# Patient Record
Sex: MALE | Race: WHITE | NOT HISPANIC OR LATINO | URBAN - METROPOLITAN AREA
[De-identification: names, ages, dates, MRNs, and addresses within clinical notes are randomized per-mention and may not be internally consistent; named-entity substitution may affect disease eponyms.]

---

## 2018-05-23 ENCOUNTER — EMERGENCY (EMERGENCY)
Facility: HOSPITAL | Age: 73
LOS: 1 days | Discharge: HOME | End: 2018-05-23
Attending: EMERGENCY MEDICINE | Admitting: EMERGENCY MEDICINE
Payer: MEDICARE

## 2018-05-23 VITALS
HEIGHT: 71 IN | DIASTOLIC BLOOD PRESSURE: 92 MMHG | HEART RATE: 89 BPM | RESPIRATION RATE: 18 BRPM | WEIGHT: 179.9 LBS | TEMPERATURE: 99 F | OXYGEN SATURATION: 95 % | SYSTOLIC BLOOD PRESSURE: 123 MMHG

## 2018-05-23 VITALS
TEMPERATURE: 98 F | HEART RATE: 71 BPM | DIASTOLIC BLOOD PRESSURE: 85 MMHG | RESPIRATION RATE: 16 BRPM | SYSTOLIC BLOOD PRESSURE: 129 MMHG | OXYGEN SATURATION: 98 %

## 2018-05-23 DIAGNOSIS — R07.81 PLEURODYNIA: ICD-10-CM

## 2018-05-23 DIAGNOSIS — Z79.1 LONG TERM (CURRENT) USE OF NON-STEROIDAL ANTI-INFLAMMATORIES (NSAID): ICD-10-CM

## 2018-05-23 DIAGNOSIS — R25.1 TREMOR, UNSPECIFIED: ICD-10-CM

## 2018-05-23 DIAGNOSIS — Z79.899 OTHER LONG TERM (CURRENT) DRUG THERAPY: ICD-10-CM
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
## 2018-05-23 PROCEDURE — 99284 EMERGENCY DEPT VISIT MOD MDM: CPT | Mod: 25

## 2018-05-23 PROCEDURE — 71250 CT THORAX DX C-: CPT | Mod: 26

## 2018-05-23 PROCEDURE — 99284 EMERGENCY DEPT VISIT MOD MDM: CPT

## 2018-05-23 PROCEDURE — 71250 CT THORAX DX C-: CPT

## 2018-05-23 RX ORDER — RASAGILINE 0.5 MG/1
0 TABLET ORAL
Qty: 0 | Refills: 0 | COMMUNITY

## 2018-05-23 RX ORDER — LIDOCAINE 4 G/100G
1 CREAM TOPICAL
Qty: 14 | Refills: 0 | OUTPATIENT
Start: 2018-05-23 | End: 2018-06-05

## 2018-05-23 RX ORDER — LIDOCAINE 4 G/100G
1 CREAM TOPICAL ONCE
Qty: 0 | Refills: 0 | Status: COMPLETED | OUTPATIENT
Start: 2018-05-23 | End: 2018-05-23

## 2018-05-23 RX ORDER — PROPAFENONE HCL 150 MG
0 TABLET ORAL
Qty: 0 | Refills: 0 | COMMUNITY

## 2018-05-23 RX ORDER — ROTIGOTINE 8 MG/24H
0 PATCH, EXTENDED RELEASE TRANSDERMAL
Qty: 0 | Refills: 0 | COMMUNITY

## 2018-05-23 RX ORDER — ACETAMINOPHEN 500 MG
975 TABLET ORAL ONCE
Qty: 0 | Refills: 0 | Status: COMPLETED | OUTPATIENT
Start: 2018-05-23 | End: 2018-05-23

## 2018-05-23 RX ORDER — APIXABAN 2.5 MG/1
0 TABLET, FILM COATED ORAL
Qty: 0 | Refills: 0 | COMMUNITY

## 2018-05-23 RX ADMIN — LIDOCAINE 1 PATCH: 4 CREAM TOPICAL at 18:42

## 2018-05-23 RX ADMIN — Medication 975 MILLIGRAM(S): at 18:42

## 2018-05-23 NOTE — ED PROVIDER NOTE - MEDICAL DECISION MAKING DETAILS
rib pain after fall, no sob, no crepitus, no flail segment noted, soft abd w/ +BS and nontender, will CT chest/rib eval for injury, analgesia, reassess, low suspicion for bowel injury

## 2018-05-23 NOTE — ED ADULT TRIAGE NOTE - CHIEF COMPLAINT QUOTE
"I fell on Monday into my night stand and hit my right side." pt c/o right sided abdominal and rib pain. denies head injury.

## 2018-05-23 NOTE — ED PROVIDER NOTE - PROGRESS NOTE DETAILS
resting comfortably, well appearing, seeking dc w/ prelim result, understands may miss potentially life threatening conditions/diagnosis on final result, ao x 4, has capacity to make decisions, understands return precautions

## 2018-05-23 NOTE — ED ADULT NURSE NOTE - OBJECTIVE STATEMENT
74 y/o male presents to the ED c/o right lower rib, RUQ abdominal pain from fall last night. Pt reports getting out of bed, tripping and falling to end table which struck his RUQ region. Pt reports abdominal mesh in place from surgery and was concerned for integrity of mesh. pt denies SOB, but rubs his right lower rib as he admits it is sore. Pt rates pain 6/10 by VAS with activity. Pt. speaks clear, MAEx4, has unlabored breathing. Abd soft, tender RUQ, nd. Skin dry, warm.

## 2018-05-23 NOTE — ED PROVIDER NOTE - PHYSICAL EXAMINATION
CON: ao x 3, HENMT: clear oropharynx, soft neck, HEAD: atraumatic, CV: rrr, equal pulses b/l, RESP: cta b/l, GI: +BS, soft, nontender, no rebound, no guarding, SKIN: no rash, MSK: tender anterior right lower rib, no crepitus, no deformities, no effusion to joints, full ROM of all extremities w/o pain, NEURO: no gross motor or sensory deficit, baseline tremors noted

## 2018-05-23 NOTE — ED ADULT NURSE NOTE - CHPI ED SYMPTOMS NEG
no abrasion/no loss of consciousness/no vomiting/no confusion/no numbness/no tingling/no deformity/no fever/no weakness

## 2018-05-23 NOTE — ED PROVIDER NOTE - OBJECTIVE STATEMENT
73 yom pw fall onto night stand, c/o R lower rib pain.  hx of abd hernia w/ mesh repair years ago, no nv, denies abd pain, +flatus, +BM.  denies HA or head trauma.  no neck pain.  hx of parkinsons.

## 2018-05-25 ENCOUNTER — APPOINTMENT (OUTPATIENT)
Dept: HEART AND VASCULAR | Facility: CLINIC | Age: 73
End: 2018-05-25
Payer: MEDICARE
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
## 2018-05-25 DIAGNOSIS — I48.0 PAROXYSMAL ATRIAL FIBRILLATION: ICD-10-CM

## 2018-05-25 DIAGNOSIS — I35.0 NONRHEUMATIC AORTIC (VALVE) STENOSIS: ICD-10-CM

## 2018-05-25 DIAGNOSIS — G20 PARKINSON'S DISEASE: ICD-10-CM

## 2018-05-25 PROCEDURE — 99214 OFFICE O/P EST MOD 30 MIN: CPT

## 2018-05-25 RX ORDER — ROTIGOTINE 6 MG/24H
6 PATCH, EXTENDED RELEASE TRANSDERMAL
Qty: 30 | Refills: 0 | Status: ACTIVE | COMMUNITY
Start: 2018-02-08

## 2018-05-25 RX ORDER — ROTIGOTINE 4 MG/24H
4 PATCH, EXTENDED RELEASE TRANSDERMAL
Qty: 30 | Refills: 0 | Status: DISCONTINUED | COMMUNITY
Start: 2018-01-27

## 2018-05-25 RX ORDER — SILDENAFIL CITRATE 100 MG/1
100 TABLET, FILM COATED ORAL
Qty: 6 | Refills: 0 | Status: ACTIVE | COMMUNITY
Start: 2017-12-08

## 2018-05-25 RX ORDER — APIXABAN 5 MG/1
5 TABLET, FILM COATED ORAL
Qty: 180 | Refills: 0 | Status: ACTIVE | COMMUNITY
Start: 2018-02-12

## 2018-05-25 RX ORDER — RASAGILINE 0.5 MG/1
0.5 TABLET ORAL
Qty: 90 | Refills: 0 | Status: DISCONTINUED | COMMUNITY
Start: 2018-02-08

## 2018-05-25 RX ORDER — ROTIGOTINE 6 MG/24H
6 PATCH, EXTENDED RELEASE TRANSDERMAL
Qty: 90 | Refills: 0 | Status: ACTIVE | COMMUNITY
Start: 2018-05-25

## 2018-05-25 RX ORDER — PROPAFENONE HYDROCHLORIDE 150 MG/1
150 TABLET, FILM COATED ORAL
Qty: 270 | Refills: 0 | Status: ACTIVE | COMMUNITY
Start: 2018-02-12
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
## 2018-05-27 PROBLEM — I48.0 PAROXYSMAL ATRIAL FIBRILLATION: Status: ACTIVE | Noted: 2018-05-27

## 2024-09-10 ENCOUNTER — APPOINTMENT (RX ONLY)
Dept: URBAN - METROPOLITAN AREA CLINIC 38 | Facility: CLINIC | Age: 79
Setting detail: DERMATOLOGY
End: 2024-09-10

## 2024-09-10 DIAGNOSIS — D18.0 HEMANGIOMA: ICD-10-CM

## 2024-09-10 DIAGNOSIS — L21.8 OTHER SEBORRHEIC DERMATITIS: ICD-10-CM

## 2024-09-10 DIAGNOSIS — Z85.820 PERSONAL HISTORY OF MALIGNANT MELANOMA OF SKIN: ICD-10-CM

## 2024-09-10 DIAGNOSIS — Z71.89 OTHER SPECIFIED COUNSELING: ICD-10-CM

## 2024-09-10 DIAGNOSIS — L82.1 OTHER SEBORRHEIC KERATOSIS: ICD-10-CM

## 2024-09-10 DIAGNOSIS — L72.0 EPIDERMAL CYST: ICD-10-CM

## 2024-09-10 DIAGNOSIS — D485 NEOPLASM OF UNCERTAIN BEHAVIOR OF SKIN: ICD-10-CM

## 2024-09-10 DIAGNOSIS — L11.1 TRANSIENT ACANTHOLYTIC DERMATOSIS [GROVER]: ICD-10-CM

## 2024-09-10 DIAGNOSIS — L57.0 ACTINIC KERATOSIS: ICD-10-CM

## 2024-09-10 DIAGNOSIS — D22 MELANOCYTIC NEVI: ICD-10-CM

## 2024-09-10 DIAGNOSIS — Z85.828 PERSONAL HISTORY OF OTHER MALIGNANT NEOPLASM OF SKIN: ICD-10-CM

## 2024-09-10 DIAGNOSIS — L81.4 OTHER MELANIN HYPERPIGMENTATION: ICD-10-CM

## 2024-09-10 DIAGNOSIS — B35.1 TINEA UNGUIUM: ICD-10-CM

## 2024-09-10 PROBLEM — D48.5 NEOPLASM OF UNCERTAIN BEHAVIOR OF SKIN: Status: ACTIVE | Noted: 2024-09-10

## 2024-09-10 PROBLEM — D18.01 HEMANGIOMA OF SKIN AND SUBCUTANEOUS TISSUE: Status: ACTIVE | Noted: 2024-09-10

## 2024-09-10 PROBLEM — D22.9 MELANOCYTIC NEVI, UNSPECIFIED: Status: ACTIVE | Noted: 2024-09-10

## 2024-09-10 PROCEDURE — ? REFERRAL CORRESPONDENCE

## 2024-09-10 PROCEDURE — 99203 OFFICE O/P NEW LOW 30 MIN: CPT | Mod: 25

## 2024-09-10 PROCEDURE — 17003 DESTRUCT PREMALG LES 2-14: CPT

## 2024-09-10 PROCEDURE — ? PRESCRIPTION

## 2024-09-10 PROCEDURE — ? OBSERVATION

## 2024-09-10 PROCEDURE — ? LIQUID NITROGEN

## 2024-09-10 PROCEDURE — ? COUNSELING

## 2024-09-10 PROCEDURE — 17000 DESTRUCT PREMALG LESION: CPT

## 2024-09-10 RX ORDER — KETOCONAZOLE 20 MG/ML
SHAMPOO, SUSPENSION TOPICAL
Qty: 120 | Refills: 3 | Status: ERX | COMMUNITY
Start: 2024-09-10

## 2024-09-10 RX ADMIN — KETOCONAZOLE: 20 SHAMPOO, SUSPENSION TOPICAL at 00:00

## 2024-09-10 ASSESSMENT — LOCATION DETAILED DESCRIPTION DERM
LOCATION DETAILED: LEFT DISTAL DORSAL FOREARM
LOCATION DETAILED: LEFT GREAT TOENAIL
LOCATION DETAILED: LEFT SUPERIOR PARIETAL SCALP
LOCATION DETAILED: PERIUMBILICAL SKIN
LOCATION DETAILED: LEFT CENTRAL TEMPLE
LOCATION DETAILED: RIGHT PROXIMAL DORSAL FOREARM
LOCATION DETAILED: LEFT SUPERIOR MEDIAL MIDBACK
LOCATION DETAILED: LEFT FOREHEAD
LOCATION DETAILED: LEFT CLAVICULAR NECK
LOCATION DETAILED: RIGHT MEDIAL TRAPEZIAL NECK
LOCATION DETAILED: STERNUM
LOCATION DETAILED: RIGHT MEDIAL INFERIOR CHEST
LOCATION DETAILED: LEFT SUPERIOR CENTRAL BUCCAL CHEEK
LOCATION DETAILED: LEFT ANTERIOR PROXIMAL UPPER ARM
LOCATION DETAILED: RIGHT INFERIOR UPPER BACK
LOCATION DETAILED: INFERIOR MID FOREHEAD

## 2024-09-10 ASSESSMENT — LOCATION SIMPLE DESCRIPTION DERM
LOCATION SIMPLE: POSTERIOR NECK
LOCATION SIMPLE: LEFT UPPER ARM
LOCATION SIMPLE: LEFT GREAT TOE
LOCATION SIMPLE: LEFT ANTERIOR NECK
LOCATION SIMPLE: LEFT LOWER BACK
LOCATION SIMPLE: ABDOMEN
LOCATION SIMPLE: LEFT FOREHEAD
LOCATION SIMPLE: SCALP
LOCATION SIMPLE: LEFT CHEEK
LOCATION SIMPLE: LEFT FOREARM
LOCATION SIMPLE: RIGHT FOREARM
LOCATION SIMPLE: CHEST
LOCATION SIMPLE: RIGHT UPPER BACK
LOCATION SIMPLE: LEFT TEMPLE
LOCATION SIMPLE: INFERIOR FOREHEAD

## 2024-09-10 ASSESSMENT — LOCATION ZONE DERM
LOCATION ZONE: NECK
LOCATION ZONE: FACE
LOCATION ZONE: TRUNK
LOCATION ZONE: SCALP
LOCATION ZONE: ARM
LOCATION ZONE: TOENAIL

## 2024-09-10 ASSESSMENT — NAIL INVOLVEMENT PERCENT: % OF NAIL(S) INVOLVED WITH INFECTION: 80

## 2024-09-10 NOTE — HPI: FULL BODY SKIN EXAMINATION
How Severe Are Your Spot(S)?: mild
What Type Of Note Output Would You Prefer (Optional)?: Bullet Format
What Is The Reason For Today's Visit?: Full Body Skin Examination
What Is The Reason For Today's Visit? (Being Monitored For X): concerning skin lesions on an annual basis
Additional History: Referred by Dr. Neri Ramirez

## 2024-10-09 ENCOUNTER — OFFICE VISIT (OUTPATIENT)
Dept: NEUROLOGY | Facility: MEDICAL CENTER | Age: 79
End: 2024-10-09
Attending: INTERNAL MEDICINE
Payer: MEDICARE

## 2024-10-09 VITALS
DIASTOLIC BLOOD PRESSURE: 68 MMHG | HEART RATE: 72 BPM | SYSTOLIC BLOOD PRESSURE: 108 MMHG | RESPIRATION RATE: 16 BRPM | TEMPERATURE: 97.8 F | OXYGEN SATURATION: 96 % | BODY MASS INDEX: 26.39 KG/M2 | HEIGHT: 70 IN | WEIGHT: 184.3 LBS

## 2024-10-09 DIAGNOSIS — K11.7 SIALORRHEA: ICD-10-CM

## 2024-10-09 DIAGNOSIS — G20.B1 PARKINSON'S DISEASE WITH DYSKINESIA WITHOUT FLUCTUATING MANIFESTATIONS (HCC): ICD-10-CM

## 2024-10-09 DIAGNOSIS — G24.4 ORAL FACIAL DYSTONIA: ICD-10-CM

## 2024-10-09 DIAGNOSIS — Z01.10 EVALUATION OF HEARING IMPAIRMENT: ICD-10-CM

## 2024-10-09 DIAGNOSIS — H53.2 DIPLOPIA: ICD-10-CM

## 2024-10-09 PROCEDURE — G2212 PROLONG OUTPT/OFFICE VIS: HCPCS | Performed by: INTERNAL MEDICINE

## 2024-10-09 PROCEDURE — 99202 OFFICE O/P NEW SF 15 MIN: CPT | Performed by: INTERNAL MEDICINE

## 2024-10-09 PROCEDURE — 3074F SYST BP LT 130 MM HG: CPT | Performed by: INTERNAL MEDICINE

## 2024-10-09 PROCEDURE — 3078F DIAST BP <80 MM HG: CPT | Performed by: INTERNAL MEDICINE

## 2024-10-09 PROCEDURE — G2211 COMPLEX E/M VISIT ADD ON: HCPCS | Performed by: INTERNAL MEDICINE

## 2024-10-09 PROCEDURE — 99205 OFFICE O/P NEW HI 60 MIN: CPT | Performed by: INTERNAL MEDICINE

## 2024-10-09 RX ORDER — ASPIRIN 81 MG/1
81 TABLET ORAL DAILY
COMMUNITY

## 2024-10-09 RX ORDER — ALENDRONATE SODIUM 70 MG/1
70 TABLET ORAL
COMMUNITY

## 2024-10-09 RX ORDER — RASAGILINE 0.5 MG/1
1 TABLET ORAL DAILY
COMMUNITY
End: 2024-10-09 | Stop reason: SDUPTHER

## 2024-10-09 RX ORDER — RASAGILINE 0.5 MG/1
1 TABLET ORAL DAILY
Qty: 90 TABLET | Refills: 3 | Status: SHIPPED | OUTPATIENT
Start: 2024-10-09 | End: 2025-10-09

## 2024-10-09 RX ORDER — PROPAFENONE HYDROCHLORIDE 150 MG/1
150 TABLET, COATED ORAL EVERY 8 HOURS
COMMUNITY

## 2024-10-09 RX ORDER — PRAVASTATIN SODIUM 40 MG
40 TABLET ORAL NIGHTLY
COMMUNITY

## 2024-10-09 RX ORDER — CYANOCOBALAMIN (VITAMIN B-12) 1000 MCG
1 TABLET ORAL DAILY
COMMUNITY

## 2024-10-09 RX ORDER — ROTIGOTINE 8 MG/24H
8 PATCH, EXTENDED RELEASE TRANSDERMAL DAILY
COMMUNITY
End: 2024-10-09 | Stop reason: SDUPTHER

## 2024-10-09 RX ORDER — CARBIDOPA AND LEVODOPA 25; 100 MG/1; MG/1
2 TABLET ORAL 3 TIMES DAILY
Qty: 540 TABLET | Refills: 3 | Status: SHIPPED | OUTPATIENT
Start: 2024-10-09 | End: 2025-10-09

## 2024-10-09 RX ORDER — ROTIGOTINE 8 MG/24H
8 PATCH, EXTENDED RELEASE TRANSDERMAL DAILY
Qty: 30 PATCH | Refills: 11 | Status: SHIPPED | OUTPATIENT
Start: 2024-10-09 | End: 2025-10-09

## 2024-10-09 ASSESSMENT — PATIENT HEALTH QUESTIONNAIRE - PHQ9: CLINICAL INTERPRETATION OF PHQ2 SCORE: 0

## 2024-10-21 ENCOUNTER — TELEPHONE (OUTPATIENT)
Dept: NEUROLOGY | Facility: MEDICAL CENTER | Age: 79
End: 2024-10-21
Payer: MEDICARE

## 2024-10-28 ENCOUNTER — TELEPHONE (OUTPATIENT)
Dept: OPHTHALMOLOGY | Facility: MEDICAL CENTER | Age: 79
End: 2024-10-28
Payer: MEDICARE

## 2024-11-07 ENCOUNTER — OFFICE VISIT (OUTPATIENT)
Dept: NEUROLOGY | Facility: MEDICAL CENTER | Age: 79
End: 2024-11-07
Attending: INTERNAL MEDICINE
Payer: MEDICARE

## 2024-11-07 ENCOUNTER — OFFICE VISIT (OUTPATIENT)
Dept: OPHTHALMOLOGY | Facility: MEDICAL CENTER | Age: 79
End: 2024-11-07
Payer: MEDICARE

## 2024-11-07 VITALS
TEMPERATURE: 97.7 F | HEART RATE: 60 BPM | BODY MASS INDEX: 26.39 KG/M2 | WEIGHT: 184.3 LBS | SYSTOLIC BLOOD PRESSURE: 112 MMHG | DIASTOLIC BLOOD PRESSURE: 68 MMHG | RESPIRATION RATE: 18 BRPM | HEIGHT: 70 IN | OXYGEN SATURATION: 95 %

## 2024-11-07 DIAGNOSIS — G24.4 ORAL FACIAL DYSTONIA: ICD-10-CM

## 2024-11-07 DIAGNOSIS — H53.2 DOUBLE VISION: ICD-10-CM

## 2024-11-07 DIAGNOSIS — G20.B1 PARKINSON'S DISEASE WITH DYSKINESIA WITHOUT FLUCTUATING MANIFESTATIONS (HCC): Primary | ICD-10-CM

## 2024-11-07 DIAGNOSIS — H53.2 DIPLOPIA: ICD-10-CM

## 2024-11-07 DIAGNOSIS — K11.7 SIALORRHEA: ICD-10-CM

## 2024-11-07 DIAGNOSIS — Z85.828 HISTORY OF BASAL CELL CARCINOMA (BCC) OF EYELID: ICD-10-CM

## 2024-11-07 DIAGNOSIS — G20.B1 PARKINSON'S DISEASE WITH DYSKINESIA WITHOUT FLUCTUATING MANIFESTATIONS (HCC): ICD-10-CM

## 2024-11-07 PROBLEM — H01.003 BLEPHARITIS OF BOTH EYES: Status: ACTIVE | Noted: 2024-11-07

## 2024-11-07 PROBLEM — H01.006 BLEPHARITIS OF BOTH EYES: Status: ACTIVE | Noted: 2024-11-07

## 2024-11-07 PROCEDURE — 3074F SYST BP LT 130 MM HG: CPT | Performed by: INTERNAL MEDICINE

## 2024-11-07 PROCEDURE — 92060 SENSORIMOTOR EXAMINATION: CPT | Performed by: STUDENT IN AN ORGANIZED HEALTH CARE EDUCATION/TRAINING PROGRAM

## 2024-11-07 PROCEDURE — 700111 HCHG RX REV CODE 636 W/ 250 OVERRIDE (IP): Mod: JZ,JG

## 2024-11-07 PROCEDURE — 64612 DESTROY NERVE FACE MUSCLE: CPT | Performed by: INTERNAL MEDICINE

## 2024-11-07 PROCEDURE — 92250 FUNDUS PHOTOGRAPHY W/I&R: CPT | Performed by: STUDENT IN AN ORGANIZED HEALTH CARE EDUCATION/TRAINING PROGRAM

## 2024-11-07 PROCEDURE — 64611 CHEMODENERV SALIV GLANDS: CPT | Performed by: INTERNAL MEDICINE

## 2024-11-07 PROCEDURE — 99205 OFFICE O/P NEW HI 60 MIN: CPT | Mod: 25 | Performed by: STUDENT IN AN ORGANIZED HEALTH CARE EDUCATION/TRAINING PROGRAM

## 2024-11-07 PROCEDURE — 64612 DESTROY NERVE FACE MUSCLE: CPT | Mod: 50 | Performed by: INTERNAL MEDICINE

## 2024-11-07 PROCEDURE — 99213 OFFICE O/P EST LOW 20 MIN: CPT | Mod: 25 | Performed by: INTERNAL MEDICINE

## 2024-11-07 PROCEDURE — 700101 HCHG RX REV CODE 250

## 2024-11-07 PROCEDURE — 3078F DIAST BP <80 MM HG: CPT | Performed by: INTERNAL MEDICINE

## 2024-11-07 RX ADMIN — SODIUM CHLORIDE 100 UNITS: 9 INJECTION INTRAMUSCULAR; INTRAVENOUS; SUBCUTANEOUS at 09:20

## 2024-11-07 ASSESSMENT — CONF VISUAL FIELD
OS_NORMAL: 1
OS_INFERIOR_NASAL_RESTRICTION: 0
OS_SUPERIOR_NASAL_RESTRICTION: 0
OS_INFERIOR_TEMPORAL_RESTRICTION: 0
OS_SUPERIOR_TEMPORAL_RESTRICTION: 0
OD_INFERIOR_NASAL_RESTRICTION: 0
OD_SUPERIOR_NASAL_RESTRICTION: 0
OD_NORMAL: 1
OD_SUPERIOR_TEMPORAL_RESTRICTION: 0
OD_INFERIOR_TEMPORAL_RESTRICTION: 0

## 2024-11-07 ASSESSMENT — REFRACTION_WEARINGRX
OS_SPHERE: +2.75
OD_AXIS: 039
OS_CYLINDER: +1.75
OS_AXIS: 152
OD_CYLINDER: +1.00
OD_SPHERE: +2.75
SPECS_TYPE: SVL

## 2024-11-07 ASSESSMENT — REFRACTION_MANIFEST
OS_AXIS: 177
OD_CYLINDER: +1.50
OS_CYLINDER: +1.50
OD_AXIS: 043
OD_SPHERE: -0.75
OS_SPHERE: -1.00
METHOD_AUTOREFRACTION: 1

## 2024-11-07 ASSESSMENT — VISUAL ACUITY
METHOD: SNELLEN - LINEAR
OS_SC: 20/80
OS_PH_SC: 20/60
OD_SC+: +1
OS_SC+: -1
OS_PH_SC+: +1
OD_SC: 20/40

## 2024-11-07 ASSESSMENT — TONOMETRY
OS_IOP_MMHG: 13
IOP_METHOD: ICARE
OD_IOP_MMHG: 15

## 2024-11-07 ASSESSMENT — CUP TO DISC RATIO
OD_RATIO: 0.6
OS_RATIO: 0.6

## 2024-11-07 ASSESSMENT — PATIENT HEALTH QUESTIONNAIRE - PHQ9: CLINICAL INTERPRETATION OF PHQ2 SCORE: 0

## 2024-11-07 ASSESSMENT — EXTERNAL EXAM - RIGHT EYE: OD_EXAM: NORMAL

## 2024-11-07 ASSESSMENT — SLIT LAMP EXAM - LIDS: COMMENTS: BLEPHARITIS

## 2024-11-07 ASSESSMENT — EXTERNAL EXAM - LEFT EYE: OS_EXAM: NORMAL

## 2024-11-07 NOTE — PROGRESS NOTES
Adonay Olson,   Neurology, Movement Disorders [facility]  [address], [address]. [place] [address]  Phone: [phone], Fax: [phone]     ASSESSMENT / PLAN   Osman Choi is a 79 y.o. RHD male presenting for Parkinson's    Parkinson's disease  Gait instability  Dx 2016 with left rest tremor.   - continue therapy  - Rasagiline 0.5 mg  - Neupro patch 8 mg  - Carbidopa/levodopa 25/100: 2 tablet, 3x/day at 8AM, 12PM, 5PM    Bruxism/facial dystonia  Sialorrhea  Drier humidity helps the drooling a bit more, but interested in continuing  - Botox 100U: repeat every 3 months    REM sleep behavior disorder  - melatonin tablets. Max dose is 15mg per night. Take 1 hour before bedtime, at a consistent time each night. Look for label with USP certification. If not staying asleep through the whole night, look for extended-release melatonin (REMfresh brand) online.   - discussed safe sleep environment  - if insufficient, can start clonazepam 0.5mg. Message me if needing this prescription.     Diplopia  Was seen at [facility] for diplopia, but had to have basal cell carcinoma of eyelid removed prior to strabismus surgery. Needs to reestablish care since moving.  - pending neuro-ophthalmology    Orders Placed This Encounter    onabotulinum toxin type A (Botox) injection 100 Units     Return in about 3 months (around 2/7/2025) for Botox no ultrasound.    BILLING DOCUMENTATION:   [facility] Level 2  51121 Sialorrhea, bilateral parotids (no modifier) , 99328 Face, and modifier -50 bilateral                HISTORY OF PRESENT ILLNESS   Osman Choi is a 79 y.o. RHD male presenting for Parkinson's  PMH: atrial fibrillation    Initial HPI 10/09/24  Dx: 2016, with initial symptoms as left hand rest tremor.  Neuroleptics/pesticide exposure:   Family history:     Interval history  4/2022 Dr. Denny Combs in [place], [place]   8/2024 Dr. Cheikh Campbell: Started ropinirole 1mg nightly for what was thought to be RLS,  but discontinued.   10/09/24: first visit with me. Referral to neuro-oph, audiology.  11/07/24: Botox #1 for jaw dystonia/bruxism and sialorrhea.     Current Regimen  Rasagiline 0.5 mg  Neupro patch 8 mg  Carbidopa/levodopa 25/100: 2 tablet, 3x/day at 8AM, 12PM, 5PM  Latency: can't tell  Duration: can't tell  Efficacy: can't tell  Dyskinesia/Dystonia: tongue protrusions at times. Legs can move sometimes. Used to have toe curling.  Sfx: none    ROS:  Gait:   a few falls since last visit. Balance worsening, shuffling more.   Orthostasis:   no issues  Constipation:   no issues  Urinary issues:   no issues, hx of UTI   Speech/Swallow:   no dysphagia. +hypophonia. SLP ongoing.  Anosmia: +impaired  Cognition:   no issues  Mood:   no anxiety, no depression  Hallucinations:   none  Sleep/RBD:   dream enactment ongoing for several years    No past medical history on file.  No past surgical history on file.  No family history on file.  Social History     Socioeconomic History    Marital status:      Spouse name: Not on file    Number of children: Not on file    Years of education: Not on file    Highest education level: Not on file   Occupational History    Not on file   Tobacco Use    Smoking status: Never    Smokeless tobacco: Never   Vaping Use    Vaping status: Never Used   Substance and Sexual Activity    Alcohol use: Yes     Alcohol/week: 8.4 oz     Types: 14 Glasses of wine per week    Drug use: Never    Sexual activity: Not on file   Other Topics Concern    Not on file   Social History Narrative    Not on file     Social Drivers of Health     Financial Resource Strain: Not on file   Food Insecurity: No Food Insecurity (5/11/2024)    [place] [facility] [place] [facility] [place]    Hunger Vital Sign     Worried About Running Out of Food in the Last Year: Never true     Ran Out of Food in the Last Year: Never true   Transportation Needs: No Transportation Needs (7/30/2024)    [facility] prior to 7/1/2021)    OASIS : Transportation     Lack of Transportation (Medical): No     Lack of Transportation (Non-Medical): No     Patient Unable or Declines to Respond: No   Physical Activity: Not on file   Stress: Not on file   Social Connections: Feeling Socially Integrated (7/30/2024)    [facility] prior to 7/1/2021)    OASIS : Social Isolation     Frequency of experiencing loneliness or isolation: Never   Intimate Partner Violence: Not At Risk (10/6/2024)    Received from [employer] (and [facility] prior to 7/1/2021)    Intimate Partner Violence      Are you in a relationship with someone who hurts you emotionally and/or physically?: No   Housing Stability: Low Risk  (5/11/2024)    [facility], [facility]    Housing Stability Vital Sign     Unable to Pay for Housing in the Last Year: No     Number of Places Lived in the Last Year: 1     In the last 12 months, was there a time when you did not have a steady place to sleep or slept in a shelter (including now)?: No     Current Outpatient Medications   Medication    propafenone (RYTHMOL) 150 MG Tab    apixaban (ELIQUIS) 5mg Tab    pravastatin (PRAVACHOL) 40 MG tablet    alendronate (FOSAMAX) 70 MG Tab    Cyanocobalamin (B-12) 1000 MCG Tab    Cholecalciferol (D3 2000 PO)    aspirin 81 MG EC tablet    carbidopa-levodopa (SINEMET)  MG Tab    Rasagiline Mesylate 0.5 MG Tab    Rotigotine (NEUPRO) 8 MG/24HR PATCH 24 HR     Current Facility-Administered Medications   Medication Dose    onabotulinum toxin type A (Botox) injection 100 Units  100 Units     No Known Allergies          DATA / RESULTS   CT Head 8/2024  1. No acute intracranial abnormality.   2. Calvarium is intact.     Vit D: 60  TSH:  "1.73  B12: 448  Folate: 8.2    No results found for: \"25HYDROXY\", \"DVCXYZHQ86\", \"TSHULTRASEN\", \"SPIFINTERP\", \"LYA735\", \"JEROD\", \"HBA1C\", \"LDL\"             OBJECTIVE      Vitals:    11/07/24 0837   BP: 112/68   BP Location: Left arm   Patient Position: Sitting   BP Cuff Size: Adult   Pulse: 60   Resp: 18   Temp: 36.5 °C (97.7 °F)   TempSrc: Temporal   SpO2: 95%   Weight: 83.6 kg (184 lb 4.9 oz)   Height: 1.778 m (5' 10\")     Physical Exam  Speech clear and fluent, mild hypophonia.  Intermittent right arm and leg dyskinesias        PROCEDURE     BOTULINUM TOXIN INJECTION  Procedure Date: 11/07/24    INFORMED CONSENT   The risks and benefits of the procedure were explained to the patient, and all questions were answered. Adverse effects from toxin injection include but are not limited to: excessive weakness, infection, breathing and/or swallowing difficulty.  Common adverse effects from the injection itself are pain and bruising. The patient demonstrated good understanding of risks and benefits and consents to botulinum toxin injection.     Botox 100 U were diluted in 1 ml of saline.   30 gauge needle used    Diagnosis: Sialorrhea Right Left Total # Units   Parotid gland 20U 20U 40U         Diagnosis:   facial dystonia, bruxism      Temporalis 5U x4 5U x4 40U         Total units used: 80  Total units wasted: 20    Patient tolerated the procedure well, no complications.  "

## 2024-11-07 NOTE — PATIENT INSTRUCTIONS
Avoid for the first 2 hours:  Lifting heavy items.  Working out.  Doing activities that make your heart beat faster.    Avoid for the rest of the day:  Do not rub or lie down on the area where you got the injections. This can spread the toxin.    Contact a doctor if:  You have a headache that gets worse.  You have a fever.  Your eyelids start to get droopy or swollen.    Get help right away if:  You have signs of an allergic reaction. These include:  An itchy rash or welts.  Wheezing or shortness of breath.  Dizziness.  You have trouble speaking.  You feel short of breath or have trouble breathing.  You start to have trouble swallowing.

## 2024-11-07 NOTE — PROGRESS NOTES
Peds/Neuro Ophthalmology:   Wes Hoskins M.D.    Date & Time note created:    11/7/2024   5:44 PM     Referring MD / APRN:  Durga Anderson M.D., No att. providers found    Patient ID:  Name:             Osman Choi   YOB: 1945  Age:                 79 y.o.  male   MRN:               [id number]    Chief Complaint/Reason for Visit:     No chief complaint on file.      History of Present Illness:      Osman Choi is a 78 yo man presenting for new patient evaluation of diplopia in the setting of parkinsons disease    He follows with Dr Olson in Neurology     Mr Choi reports developing double vision 4-5 years ago without instigating event. He denies any prior history of double vision. The double vision has been constant since onset. It is horizontal at both near and distance and has been stable. Diplopia resolves with closure of either eye. Objects are completely separate. With his prism rx he does not notice diplopia, but will occasionally have break through diplopia with near work. The episodic double vision at near can be blinked away. He denies headaches. He denies any grittiness or pain in the eye, loss of vision or blurred vision     Osman was previously following at the [facility]. Notes report double vision but it does not appear he had work  up done at that time. Pt does have history of basal cell carcinoma of the R lower eyelid s/p MOHS 11/1/22    Exam 3/15/2023:  VA OD 20/60 and OS 20/60   IOP OD 16 / IOP OS 13   Healing nicely   No evidence of misdirect lashes  Dendrites on lashes OS  No sign of reoccurrence cancer           Review of Systems:  ROS    Past Medical History:   Past Medical History:   Diagnosis Date    Parkinson disease (HCC)        Past Surgical History:  History reviewed. No pertinent surgical history.    Current Outpatient Medications:  Current Outpatient Medications   Medication Sig Dispense Refill    propafenone (RYTHMOL) 150 MG Tab Take 150 mg by  mouth every 8 hours.      apixaban (ELIQUIS) 5mg Tab Take 5 mg by mouth 2 times a day.      pravastatin (PRAVACHOL) 40 MG tablet Take 40 mg by mouth every evening.      alendronate (FOSAMAX) 70 MG Tab Take 70 mg by mouth every 7 days.      Cyanocobalamin (B-12) 1000 MCG Tab Take 1 Tablet by mouth every day.      Cholecalciferol (D3 2000 PO) Take 1 Tablet by mouth every day.      aspirin 81 MG EC tablet Take 81 mg by mouth every day.      carbidopa-levodopa (SINEMET)  MG Tab Take 2 Tablets by mouth 3 times a day. For Parkinson's 540 Tablet 3    Rasagiline Mesylate 0.5 MG Tab Take 1 Tablet by mouth every day. For Parkinson's 90 Tablet 3    Rotigotine (NEUPRO) 8 MG/24HR PATCH 24 HR Place 8 mg on the skin every day. For Parkinson's 30 Patch 11     Current Facility-Administered Medications   Medication Dose Route Frequency Provider Last Rate Last Admin    onabotulinum toxin type A (Botox) injection 100 Units  100 Units Injection Q90 DAYS    100 Units at 11/07/24 0920       Allergies:  No Known Allergies    Family History:  History reviewed. No pertinent family history.    Social History:  Social History     Socioeconomic History    Marital status:      Spouse name: Not on file    Number of children: Not on file    Years of education: Not on file    Highest education level: Not on file   Occupational History    Not on file   Tobacco Use    Smoking status: Never    Smokeless tobacco: Never   Vaping Use    Vaping status: Never Used   Substance and Sexual Activity    Alcohol use: Yes     Alcohol/week: 8.4 oz     Types: 14 Glasses of wine per week    Drug use: Never    Sexual activity: Not on file   Other Topics Concern    Not on file   Social History Narrative    Not on file     Social Drivers of Health     Financial Resource Strain: Not on file   Food Insecurity: No Food Insecurity (5/11/2024)    [place] [facility] [place] [place]    Hunger Vital Sign     Worried About  Running Out of Food in the Last Year: Never true     Ran Out of Food in the Last Year: Never true   Transportation Needs: No Transportation Needs (7/30/2024)    Received from [employer] (and [employer] [place], [place], and [place] prior to 7/1/2021)    OASIS : Transportation     Lack of Transportation (Medical): No     Lack of Transportation (Non-Medical): No     Patient Unable or Declines to Respond: No   Physical Activity: Not on file   Stress: Not on file   Social Connections: Feeling Socially Integrated (7/30/2024)    Received from [employer] (and [employer] [place] [place], and [place] prior to 7/1/2021)    OASIS : Social Isolation     Frequency of experiencing loneliness or isolation: Never   Intimate Partner Violence: Not At Risk (10/6/2024)    Received from [employer] (and [place] [place] and [place] prior to 7/1/2021)    Intimate Partner Violence      Are you in a relationship with someone who hurts you emotionally and/or physically?: No   Housing Stability: Low Risk  (5/11/2024)    [place] [facility] [place] [place]    Housing Stability Vital Sign     Unable to Pay for Housing in the Last Year: No     Number of Places Lived in the Last Year: 1     In the last 12 months, was there a time when you did not have a steady place to sleep or slept in a shelter (including now)?: No          Physical Exam:  Physical Exam    Oriented x 3  Weight/BMI: There is no height or weight on file to calculate BMI.  There were no vitals taken for this visit.    Base Eye Exam       Visual Acuity (Snellen - Linear)         Right Left    Dist sc 20/40 +1 20/80 -1    Dist ph sc NI 20/60 +1              Tonometry ([facility], 10:09 AM)         Right Left    Pressure 15 13              Pupils         Dark Light Shape React APD    Right 3 2 Round Brisk None    Left 3 2 Round Brisk None              Visual Fields         Right  Left     Full Full              Extraocular Movement         Right Left     Full Full              Neuro/Psych       Oriented x3: Yes    Mood/Affect: Normal   Decreased blink rate  Does not completely close eyes with blink, no obviously lagophthalmos at rest                 Additional Tests       Color         Right Left    Ishihara 1/9 1/9              Stereo       Fly: -    Animals: 0/3    Circles: 0/9                  Strabismus Exam       Reading #1   (Edited by: Wes Hoskins M.D.)      Method: Alternate cover      Distance Near Near +3DS N Bifocals     XT' 18                 - - - - - -  XT 14 - - - - - -            LHT 8          XT 10 - -  - -  XT 18 - -  - -  XT 25     LHT 12          LHT 3      - - - - - -  XT 8 - - - - - -          LHT 3         R Tilt L Tilt   XT 16 XT 14   LHT 8 LHT 16                          Reading #2   (Edited by: Wes Hoskins M.D.)      Method: Alternate cover    Correction: cc      Distance Near Near +3DS N Bifocals            X' 18                Comments    Did not bring distance prism rx  Trial of 2 base in OD over near prism did not make a difference                 Slit Lamp and Fundus Exam       External Exam         Right Left    External Normal Normal              Slit Lamp Exam         Right Left    Lids/Lashes Blepharitis, well healed scar from prior MOHS on inferior lid Blepharitis    Conjunctiva/Sclera White and quiet White and quiet    Cornea Clear Clear    Anterior Chamber Deep and quiet Deep and quiet    Iris Round and reactive Round and reactive    Lens 2+ Nuclear sclerosis 2+ Nuclear sclerosis              Fundus Exam         Right Left    Disc pink, sharp disc margins pink, sharp disc margins    C/D Ratio 0.6 0.6    Macula Normal Normal    Periphery Choroidal nevus                   Refraction       Wearing Rx         Sphere Cylinder Axis Horz Prism Vert Prism    Right +2.75 +1.00 039 1.50 BI 2.75 BU    Left +2.75 +1.75 152 4.25 BI 0.75 BD      Age: 1yr     Type: SVL              Manifest Refraction (Auto)         Sphere Cylinder Axis    Right -0.75 +1.50 043    Left -1.00 +1.50 177                    Pertinent Lab/Test/Imaging Review:  CT head wo 8/31/24  1. No acute intracranial abnormality.   2. Calvarium is intact.     Assessment and Plan:     Blepharitis of both eyes  Blepharitis on upper and lower lids OU  Previously noted at [facility]  Pt uses AT intermittent  Recommended warm compresses and regular use of AT    History of basal cell carcinoma (BCC) of eyelid  Hx of BCC of the R lower eyelid  S/p MOHS 11/1/22  No evidence of recurrence on exam     Diplopia  Osman Choi is a 80 yo man with PMH of Parkinsons who presents for evaluation of diplopia    Developed double vision 4-5 years ago without instigating event. Never worked up in [place]. He denies any prior history of double vision or amblyopia. The double vision has been constant and stable since onset. It is horizontal and binocular in all gazes. Current near and distance rx have vertical and horizontal prism. Did not bring distance rx in. Reports prism rx eliminate double vision, except will have intermittent double on near that he is able to blink away    Exam 11/7/24: no APD, VA 20/40+1 OD, 20/60+1 OS PH, 1/9 color plates OU, full CF. EOM full. Alt cover demonstrates a large incomitant XT and large incomitant LHT. Exam difficult due to inattention. 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 Optic nerves appear pink with sharp disc margins. RNFL 90 OD 89 OS    Plan:   Double vision is both binocular and monocular. Patient is currently symptomatic from monocular diplopia when doing near work. He does not fully close his eyes with blink and has a decreased blink rate secondary to his PD. Recommended regular use of AT. If he is noted to have lagophthalmos when sleeping may benefit for gel or  moisture chamber goggles at night.     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 With prism rx he continues to demonstrate significant misalignment but denies nidia double vision. Suspect his misalignment has been longstanding as he is able to fuse over 18 prism diopters. The LHT additionally fits a chronic pattern. Patient has never had work up for his double vision in the past. Discussed work up with MRI brain wwo, however pt deferred at this time. He has had multiple CTH over the 6 years which have been normal. Will continue to monitor closely. If binocular diplopia worsens will request MRI brain wwo    -RTC in 4 months  -regular use of AT  -Obtain CTH from [facility] 8/31/24    Parkinson's disease with dyskinesia without fluctuating manifestations (HCC)  Follows with Dr Olson  Meds: Sinemet 25-100mg 2 tabs TID, Neupro 8mg patch daily, Rasagiline 0.5mg daily   Discussed decreased blink rate in PD and need for regular use of AT          Wes Hoskins M.D.    88 total minutes were spent reviewing imaging, records, examining the patient and documenting.

## 2024-11-08 NOTE — ASSESSMENT & PLAN NOTE
Blepharitis on upper and lower lids OU  Previously noted at [facility]  Pt uses AT intermittent  Recommended warm compresses and regular use of AT

## 2024-11-08 NOTE — ASSESSMENT & PLAN NOTE
Follows with Dr Olson  Meds: Sinemet 25-100mg 2 tabs TID, Neupro 8mg patch daily, Rasagiline 0.5mg daily   Discussed decreased blink rate in PD and need for regular use of AT

## 2024-11-08 NOTE — ASSESSMENT & PLAN NOTE
Osman Choi is a 80 yo man with PMH of Parkinsons who presents for evaluation of diplopia    Developed double vision 4-5 years ago without instigating event. Never worked up in [place]. He denies any prior history of double vision or amblyopia. The double vision has been constant and stable since onset. It is horizontal and binocular in all gazes. Current near and distance rx have vertical and horizontal prism. Did not bring distance rx in. Reports prism rx eliminate double vision, except will have intermittent double on near that he is able to blink away    Exam 11/7/24: no APD, VA 20/40+1 OD, 20/60+1 OS PH, 1/9 color plates OU, full CF. EOM full. Alt cover demonstrates a large incomitant XT and large incomitant LHT. Exam difficult due to inattention. 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 Optic nerves appear pink with sharp disc margins. RNFL 90 OD 89 OS    Plan:   Double vision is both binocular and monocular. Patient is currently symptomatic from monocular diplopia when doing near work. He does not fully close his eyes with blink and has a decreased blink rate secondary to his PD. Recommended regular use of AT. If he is noted to have lagophthalmos when sleeping may benefit for gel or moisture chamber goggles at night.     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 With prism rx he continues to demonstrate significant misalignment but denies nidia double vision. Suspect his misalignment has been longstanding as he is able to fuse over 18 prism diopters. The LHT additionally fits a chronic pattern. Patient has never had work up for his double vision in the past. Discussed work up with MRI brain wwo, however pt deferred at this time. He has had multiple CTH over the 6 years which have been normal. Will continue to monitor closely. If binocular  diplopia worsens will request MRI brain wwo    -RTC in 4 months  -regular use of AT  -Obtain CTH from [facility] 8/31/24

## 2024-11-11 ENCOUNTER — HOSPITAL ENCOUNTER (OUTPATIENT)
Dept: RADIOLOGY | Facility: MEDICAL CENTER | Age: 79
End: 2024-11-11
Attending: EMERGENCY MEDICINE
Payer: MEDICARE

## 2024-11-12 ENCOUNTER — APPOINTMENT (RX ONLY)
Dept: URBAN - METROPOLITAN AREA CLINIC 38 | Facility: CLINIC | Age: 79
Setting detail: DERMATOLOGY
End: 2024-11-12

## 2024-11-12 DIAGNOSIS — L21.8 OTHER SEBORRHEIC DERMATITIS: ICD-10-CM

## 2024-11-12 DIAGNOSIS — L82.1 OTHER SEBORRHEIC KERATOSIS: ICD-10-CM

## 2024-11-12 DIAGNOSIS — L81.4 OTHER MELANIN HYPERPIGMENTATION: ICD-10-CM

## 2024-11-12 DIAGNOSIS — Z85.820 PERSONAL HISTORY OF MALIGNANT MELANOMA OF SKIN: ICD-10-CM

## 2024-11-12 DIAGNOSIS — L11.1 TRANSIENT ACANTHOLYTIC DERMATOSIS [GROVER]: ICD-10-CM

## 2024-11-12 DIAGNOSIS — Z85.828 PERSONAL HISTORY OF OTHER MALIGNANT NEOPLASM OF SKIN: ICD-10-CM

## 2024-11-12 DIAGNOSIS — L57.0 ACTINIC KERATOSIS: ICD-10-CM

## 2024-11-12 DIAGNOSIS — Z71.89 OTHER SPECIFIED COUNSELING: ICD-10-CM

## 2024-11-12 DIAGNOSIS — L72.0 EPIDERMAL CYST: ICD-10-CM

## 2024-11-12 DIAGNOSIS — D22 MELANOCYTIC NEVI: ICD-10-CM

## 2024-11-12 DIAGNOSIS — B35.1 TINEA UNGUIUM: ICD-10-CM

## 2024-11-12 DIAGNOSIS — D18.0 HEMANGIOMA: ICD-10-CM

## 2024-11-12 PROBLEM — D22.9 MELANOCYTIC NEVI, UNSPECIFIED: Status: ACTIVE | Noted: 2024-11-12

## 2024-11-12 PROBLEM — D22.4 MELANOCYTIC NEVI OF SCALP AND NECK: Status: ACTIVE | Noted: 2024-11-12

## 2024-11-12 PROBLEM — D18.01 HEMANGIOMA OF SKIN AND SUBCUTANEOUS TISSUE: Status: ACTIVE | Noted: 2024-11-12

## 2024-11-12 PROCEDURE — 17000 DESTRUCT PREMALG LESION: CPT

## 2024-11-12 PROCEDURE — 17003 DESTRUCT PREMALG LES 2-14: CPT

## 2024-11-12 PROCEDURE — ? LIQUID NITROGEN

## 2024-11-12 PROCEDURE — ? COUNSELING

## 2024-11-12 PROCEDURE — 99213 OFFICE O/P EST LOW 20 MIN: CPT | Mod: 25

## 2024-11-12 ASSESSMENT — LOCATION DETAILED DESCRIPTION DERM
LOCATION DETAILED: LEFT GREAT TOENAIL
LOCATION DETAILED: LEFT FOREHEAD
LOCATION DETAILED: SUPERIOR MID FOREHEAD
LOCATION DETAILED: EPIGASTRIC SKIN
LOCATION DETAILED: LEFT ANTERIOR PROXIMAL UPPER ARM
LOCATION DETAILED: LEFT ULNAR DORSAL HAND
LOCATION DETAILED: RIGHT CENTRAL PARIETAL SCALP
LOCATION DETAILED: RIGHT SUPERIOR FRONTAL SCALP
LOCATION DETAILED: RIGHT PROXIMAL DORSAL FOREARM
LOCATION DETAILED: RIGHT DISTAL DORSAL FOREARM
LOCATION DETAILED: LEFT SUPERIOR MEDIAL MIDBACK
LOCATION DETAILED: RIGHT CENTRAL POSTAURICULAR SKIN
LOCATION DETAILED: LEFT SUPERIOR PARIETAL SCALP
LOCATION DETAILED: LEFT PROXIMAL DORSAL FOREARM

## 2024-11-12 ASSESSMENT — LOCATION ZONE DERM
LOCATION ZONE: SCALP
LOCATION ZONE: FACE
LOCATION ZONE: TRUNK
LOCATION ZONE: TOENAIL
LOCATION ZONE: ARM
LOCATION ZONE: HAND

## 2024-11-12 ASSESSMENT — LOCATION SIMPLE DESCRIPTION DERM
LOCATION SIMPLE: LEFT GREAT TOE
LOCATION SIMPLE: LEFT FOREHEAD
LOCATION SIMPLE: LEFT FOREARM
LOCATION SIMPLE: LEFT HAND
LOCATION SIMPLE: SUPERIOR FOREHEAD
LOCATION SIMPLE: LEFT UPPER ARM
LOCATION SIMPLE: SCALP
LOCATION SIMPLE: ABDOMEN
LOCATION SIMPLE: RIGHT FOREARM
LOCATION SIMPLE: LEFT LOWER BACK

## 2024-11-12 NOTE — PROCEDURE: MIPS QUALITY
Detail Level: Detailed
Quality 130: Documentation Of Current Medications In The Medical Record: Current Medications Documented
Quality 226: Preventive Care And Screening: Tobacco Use: Screening And Cessation Intervention: Patient screened for tobacco use and is an ex/non-smoker
Quality 111:Pneumonia Vaccination Status For Older Adults: Pneumococcal Vaccination Previously Received
Quality 137: Melanoma: Continuity Of Care - Recall System: Patient information entered into a recall system that includes: target date for the next exam specified AND a process to follow up with patients regarding missed or unscheduled appointments

## 2024-11-21 ENCOUNTER — APPOINTMENT (OUTPATIENT)
Dept: NEUROLOGY | Facility: MEDICAL CENTER | Age: 79
End: 2024-11-21
Attending: INTERNAL MEDICINE
Payer: MEDICARE

## 2025-04-11 ENCOUNTER — OFFICE VISIT (OUTPATIENT)
Dept: NEUROLOGY | Facility: MEDICAL CENTER | Age: 80
End: 2025-04-11
Attending: INTERNAL MEDICINE
Payer: MEDICARE

## 2025-04-11 VITALS
HEIGHT: 70 IN | OXYGEN SATURATION: 97 % | SYSTOLIC BLOOD PRESSURE: 114 MMHG | HEART RATE: 91 BPM | WEIGHT: 183.42 LBS | DIASTOLIC BLOOD PRESSURE: 70 MMHG | TEMPERATURE: 97.6 F | BODY MASS INDEX: 26.26 KG/M2 | RESPIRATION RATE: 16 BRPM

## 2025-04-11 DIAGNOSIS — K11.7 SIALORRHEA: ICD-10-CM

## 2025-04-11 DIAGNOSIS — R49.8 HYPOPHONIA: ICD-10-CM

## 2025-04-11 DIAGNOSIS — G20.B1 PARKINSON'S DISEASE WITH DYSKINESIA WITHOUT FLUCTUATING MANIFESTATIONS (HCC): ICD-10-CM

## 2025-04-11 PROCEDURE — 64611 CHEMODENERV SALIV GLANDS: CPT | Performed by: INTERNAL MEDICINE

## 2025-04-11 PROCEDURE — 3078F DIAST BP <80 MM HG: CPT | Performed by: INTERNAL MEDICINE

## 2025-04-11 PROCEDURE — 3074F SYST BP LT 130 MM HG: CPT | Performed by: INTERNAL MEDICINE

## 2025-04-11 PROCEDURE — 64616 CHEMODENERV MUSC NECK DYSTON: CPT | Mod: 50 | Performed by: INTERNAL MEDICINE

## 2025-04-11 PROCEDURE — 700101 HCHG RX REV CODE 250

## 2025-04-11 PROCEDURE — 99215 OFFICE O/P EST HI 40 MIN: CPT | Mod: 25 | Performed by: INTERNAL MEDICINE

## 2025-04-11 PROCEDURE — 700111 HCHG RX REV CODE 636 W/ 250 OVERRIDE (IP): Mod: JZ,TB

## 2025-04-11 RX ORDER — CARBIDOPA AND LEVODOPA 25; 100 MG/1; MG/1
2 TABLET ORAL 3 TIMES DAILY
Qty: 540 TABLET | Refills: 3 | Status: SHIPPED | OUTPATIENT
Start: 2025-04-11 | End: 2026-04-11

## 2025-04-11 RX ORDER — ROTIGOTINE 8 MG/24H
8 PATCH, EXTENDED RELEASE TRANSDERMAL DAILY
Qty: 30 PATCH | Refills: 11 | Status: SHIPPED | OUTPATIENT
Start: 2025-04-11 | End: 2026-04-11

## 2025-04-11 RX ORDER — RASAGILINE 0.5 MG/1
1 TABLET ORAL DAILY
Qty: 90 TABLET | Refills: 3 | Status: SHIPPED | OUTPATIENT
Start: 2025-04-11 | End: 2026-04-11

## 2025-04-11 RX ADMIN — SODIUM CHLORIDE 100 UNITS: 9 INJECTION INTRAMUSCULAR; INTRAVENOUS; SUBCUTANEOUS at 14:38

## 2025-04-11 ASSESSMENT — PATIENT HEALTH QUESTIONNAIRE - PHQ9: CLINICAL INTERPRETATION OF PHQ2 SCORE: 0

## 2025-04-11 NOTE — PROGRESS NOTES
Adonay Olson,   Neurology, Movement Disorders [facility]  [address], [address]. [place] [address]  Phone: [phone], Fax: [phone]     ASSESSMENT / PLAN   Osman Choi is a 79 y.o. RHD male presenting for Parkinson's    Parkinson's disease  Gait instability  Dx 2016 with left rest tremor.   - Rasagiline 0.5 mg  - Neupro patch 8 mg  - Carbidopa/levodopa 25/100: 2 tablet, 3x/day  - recommend PT, defer for SLP at the moment    Hypophonia  - SLP referral to [facility]    Bruxism/facial dystonia  Sialorrhea  Drier humidity helps the drooling a bit more, but interested in continuing  - Botox 100U: repeat every 3 months    REM sleep behavior disorder  - melatonin if worsening  - discussed safe sleep environment    Diplopia  Was seen at [facility] for diplopia, but had to have basal cell carcinoma of eyelid removed prior to strabismus surgery  - following with neuro-ophthalmology      Orders Placed This Encounter    Referral to Speech Therapy    Rotigotine (NEUPRO) 8 MG/24HR PATCH 24 HR    Rasagiline Mesylate 0.5 MG Tab    carbidopa-levodopa (SINEMET)  MG Tab     Return in about 4 months (around 8/11/2025) for Botox no ultrasound.    BILLING DOCUMENTATION:   Excluding time spent on procedures during visit, I spent 40 minutes reviewing the medical record, interviewing and examining the patient, discussing diagnosis and treatment, and coordinating care.    [address] Sialorrhea, bilateral parotids (no modifier)  and modifier -50 bilateral                HISTORY OF PRESENT ILLNESS   Osman Choi is a 79 y.o. RHD male presenting for Parkinson's  PMH: atrial fibrillation    Initial HPI 10/09/24  Dx: 2016, with initial symptoms as left hand rest tremor.      Interval history  4/2022 Dr. Denny Combs in [place], [place]   8/2024 Dr. Cheikh Campbell: Started ropinirole 1mg nightly for what was thought to be RLS, but discontinued.   10/09/24: first visit with me. Referral to neuro-oph,  audiology.  11/07/24: Botox #1 for jaw dystonia/bruxism and sialorrhea.  04/11/25: Botox #2. Referral to SLP       Current Regimen  Rasagiline 0.5 mg  Neupro patch 8 mg  Carbidopa/levodopa 25/100: 2 tablet, 3x/day at 9AM, 1PM, 6PM  Latency: can't tell  Duration: can't tell  Efficacy: can't tell  Dyskinesia/Dystonia: tongue protrusions at times. Legs can move sometimes. Used to have toe curling.  Sfx: none      ROS:  Gait:   a few falls since last visit. Balance worsening, shuffling more.   04/11/25: had pneumonia and recovering strength. Not as balanced. Not yet interested in PT  Orthostasis:   no issues  Constipation:   no issues  Urinary issues:   no issues, hx of UTI   Speech/Swallow:   no dysphagia.   +hypophonia. 04/11/25: Worsening, hard to understand at restaurants  Anosmia: +impaired  Cognition:   no issues  Mood:   no anxiety, no depression, no impulsivity  04/11/25 PHQ-9 = 0, C-SSRS = neg  Hallucinations:   none  Sleep/RBD:   +RBD: dream enactment ongoing for several years. 04/11/25: improved  Sleeps well, 1-2x/night for bathroom. No daytime fatigue    Past Medical History:   Diagnosis Date    Parkinson disease (HCC)      No past surgical history on file.  No family history on file.  Social History     Socioeconomic History    Marital status:      Spouse name: Not on file    Number of children: Not on file    Years of education: Not on file    Highest education level: Not on file   Occupational History    Not on file   Tobacco Use    Smoking status: Never    Smokeless tobacco: Never   Vaping Use    Vaping status: Never Used   Substance and Sexual Activity    Alcohol use: Yes     Alcohol/week: 8.4 oz     Types: 14 Glasses of wine per week    Drug use: Never    Sexual activity: Not on file   Other Topics Concern    Not on file   Social History Narrative    Not on file     Social Drivers of Health     Financial Resource Strain: Not on file   Food Insecurity: No Food Insecurity (5/11/2024)    [facility]  [facility], [facility]    Hunger Vital Sign     Worried About Running Out of Food in the Last Year: Never true     Ran Out of Food in the Last Year: Never true   Transportation Needs: No Transportation Needs (7/30/2024)    Received from [facility] (and [facility] prior to 7/1/2021)    OASIS : Transportation     Lack of Transportation (Medical): No     Lack of Transportation (Non-Medical): No     Patient Unable or Declines to Respond: No   Physical Activity: Not on file   Stress: Not on file   Social Connections: Feeling Socially Integrated (7/30/2024)    Received from [facility] [employer] (and [facility] prior to 7/1/2021)    OASIS : Social Isolation     Frequency of experiencing loneliness or isolation: Never   Intimate Partner Violence: Not At Risk (10/6/2024)    Received from [facility] (and [facility] prior to 7/1/2021)    Feeling Safe      Are you in a relationship with someone who hurts you emotionally and/or physically?: No   Housing Stability: Low Risk  (5/11/2024)    Received from [facility], [facility]    Housing Stability Vital Sign     Unable to Pay for Housing in the Last Year: No     Number of Places Lived in the Last Year: 1     Unstable Housing in the Last Year: No     Current Outpatient Medications   Medication    Rotigotine (NEUPRO) 8 MG/24HR PATCH 24 HR    Rasagiline Mesylate 0.5 MG Tab    carbidopa-levodopa (SINEMET)  MG Tab    propafenone (RYTHMOL) 150 MG Tab    apixaban (ELIQUIS) 5mg Tab    pravastatin (PRAVACHOL) 40 MG tablet    alendronate (FOSAMAX) 70 MG Tab    Cyanocobalamin (B-12) 1000 MCG Tab    Cholecalciferol (D3 2000 PO)    aspirin 81 MG EC tablet     Current Facility-Administered Medications   Medication Dose    onabotulinum toxin type A (Botox) injection 100  "Units  100 Units     No Known Allergies          DATA / RESULTS   CT Head 8/2024  1. No acute intracranial abnormality.   2. Calvarium is intact.     Vit D: 60  TSH: 1.73  B12: 448  Folate: 8.2    No results found for: \"25HYDROXY\", \"UYHMXSQR65\", \"TSHULTRASEN\", \"SPIFINTERP\", \"ZLE239\", \"JEROD\", \"HBA1C\", \"LDL\"             OBJECTIVE      Vitals:    04/11/25 1358   BP: 114/70   BP Location: Left arm   Patient Position: Sitting   BP Cuff Size: Adult   Pulse: 91   Resp: 16   Temp: 36.4 °C (97.6 °F)   TempSrc: Temporal   SpO2: 97%   Weight: 83.2 kg (183 lb 6.8 oz)   Height: 1.778 m (5' 10\")       Physical Exam    +hypophonia    UPDRS Right Left   Finger tapping 0 1   Hand Movement 0 0   Toe Tapping 0 0   Leg Agility 0 1   Rigidity 0 1   Rest Tremor 0 0   Postural Tremor 0 0   Kinetic Tremor 0 0      No dystonia, dyskinesias, tics, stereotypies, athetosis, akathisia, or chorea noted.     Gait  Posture - slightly stooped   Base - narrow   Stride length - decreased mild   Arm swing - decreased   Speed - normal  Shuffling/freezing - none  U-Turn - normal           PROCEDURE     BOTULINUM TOXIN INJECTION  Procedure Date: 04/11/25    INFORMED CONSENT   The risks and benefits of the procedure were explained to the patient, and all questions were answered. Adverse effects from toxin injection include but are not limited to: excessive weakness, infection, breathing and/or swallowing difficulty.  Common adverse effects from the injection itself are pain and bruising. The patient demonstrated good understanding of risks and benefits and consents to botulinum toxin injection.     Botox 100 U were diluted in 1 ml of saline.   30 gauge needle used    Diagnosis: Sialorrhea Right Left Total # Units   Parotid gland 20U 20U 40U         Diagnosis:   facial dystonia, bruxism      Temporalis 5U x4 5U x4 40U         Total units used: 80  Total units wasted: 20    Patient tolerated the procedure well, no complications.  "

## 2025-04-15 PROBLEM — I48.91 ATRIAL FIBRILLATION (HCC): Status: ACTIVE | Noted: 2025-04-15

## 2025-04-15 PROBLEM — I95.9 HYPOTENSION: Status: ACTIVE | Noted: 2025-04-15

## 2025-04-15 PROBLEM — S42.90XA SHOULDER FRACTURE: Status: ACTIVE | Noted: 2025-04-15

## 2025-04-15 PROBLEM — E78.5 HLD (HYPERLIPIDEMIA): Status: ACTIVE | Noted: 2025-04-15

## 2025-04-15 PROBLEM — I35.0 SEVERE AORTIC STENOSIS: Status: ACTIVE | Noted: 2025-04-15

## 2025-04-16 ENCOUNTER — HOSPITAL ENCOUNTER (INPATIENT)
Facility: MEDICAL CENTER | Age: 80
LOS: 7 days | DRG: 306 | End: 2025-04-23
Attending: INTERNAL MEDICINE | Admitting: HOSPITALIST
Payer: MEDICARE

## 2025-04-16 ENCOUNTER — APPOINTMENT (OUTPATIENT)
Dept: RADIOLOGY | Facility: MEDICAL CENTER | Age: 80
DRG: 306 | End: 2025-04-16
Attending: STUDENT IN AN ORGANIZED HEALTH CARE EDUCATION/TRAINING PROGRAM
Payer: MEDICARE

## 2025-04-16 ENCOUNTER — APPOINTMENT (OUTPATIENT)
Dept: CARDIOLOGY | Facility: MEDICAL CENTER | Age: 80
DRG: 306 | End: 2025-04-16
Attending: INTERNAL MEDICINE
Payer: MEDICARE

## 2025-04-16 DIAGNOSIS — I48.11 LONGSTANDING PERSISTENT ATRIAL FIBRILLATION (HCC): ICD-10-CM

## 2025-04-16 DIAGNOSIS — S42.92XD CLOSED FRACTURE OF LEFT SHOULDER WITH ROUTINE HEALING, SUBSEQUENT ENCOUNTER: ICD-10-CM

## 2025-04-16 DIAGNOSIS — H53.2 DIPLOPIA: ICD-10-CM

## 2025-04-16 DIAGNOSIS — K11.7 SIALORRHEA: ICD-10-CM

## 2025-04-16 DIAGNOSIS — F51.01 PRIMARY INSOMNIA: ICD-10-CM

## 2025-04-16 DIAGNOSIS — G24.4 ORAL FACIAL DYSTONIA: ICD-10-CM

## 2025-04-16 DIAGNOSIS — G20.B1 PARKINSON'S DISEASE WITH DYSKINESIA WITHOUT FLUCTUATING MANIFESTATIONS (HCC): ICD-10-CM

## 2025-04-16 DIAGNOSIS — I35.0 SEVERE AORTIC STENOSIS: ICD-10-CM

## 2025-04-16 DIAGNOSIS — Z85.828 HISTORY OF BASAL CELL CARCINOMA (BCC) OF EYELID: ICD-10-CM

## 2025-04-16 DIAGNOSIS — I95.9 HYPOTENSION, UNSPECIFIED HYPOTENSION TYPE: ICD-10-CM

## 2025-04-16 DIAGNOSIS — E78.5 HYPERLIPIDEMIA, UNSPECIFIED HYPERLIPIDEMIA TYPE: ICD-10-CM

## 2025-04-16 LAB
ALBUMIN SERPL BCP-MCNC: 3.1 G/DL (ref 3.2–4.9)
ALBUMIN SERPL BCP-MCNC: 3.2 G/DL (ref 3.2–4.9)
ALBUMIN/GLOB SERPL: 1.2 G/DL
ALBUMIN/GLOB SERPL: 1.2 G/DL
ALP SERPL-CCNC: 32 U/L (ref 30–99)
ALP SERPL-CCNC: 39 U/L (ref 30–99)
ALT SERPL-CCNC: 6 U/L (ref 2–50)
ALT SERPL-CCNC: 6 U/L (ref 2–50)
AMPHET UR QL SCN: NEGATIVE
ANION GAP SERPL CALC-SCNC: 11 MMOL/L (ref 7–16)
ANION GAP SERPL CALC-SCNC: 11 MMOL/L (ref 7–16)
APPEARANCE UR: CLEAR
AST SERPL-CCNC: 19 U/L (ref 12–45)
AST SERPL-CCNC: 20 U/L (ref 12–45)
BARBITURATES UR QL SCN: NEGATIVE
BENZODIAZ UR QL SCN: NEGATIVE
BILIRUB SERPL-MCNC: 0.6 MG/DL (ref 0.1–1.5)
BILIRUB SERPL-MCNC: 0.7 MG/DL (ref 0.1–1.5)
BILIRUB UR QL STRIP.AUTO: NEGATIVE
BUN SERPL-MCNC: 14 MG/DL (ref 8–22)
BUN SERPL-MCNC: 15 MG/DL (ref 8–22)
BZE UR QL SCN: NEGATIVE
CALCIUM ALBUM COR SERPL-MCNC: 8.7 MG/DL (ref 8.5–10.5)
CALCIUM ALBUM COR SERPL-MCNC: 8.9 MG/DL (ref 8.5–10.5)
CALCIUM SERPL-MCNC: 8 MG/DL (ref 8.5–10.5)
CALCIUM SERPL-MCNC: 8.3 MG/DL (ref 8.5–10.5)
CANNABINOIDS UR QL SCN: NEGATIVE
CHLORIDE SERPL-SCNC: 105 MMOL/L (ref 96–112)
CHLORIDE SERPL-SCNC: 106 MMOL/L (ref 96–112)
CO2 SERPL-SCNC: 20 MMOL/L (ref 20–33)
CO2 SERPL-SCNC: 20 MMOL/L (ref 20–33)
COLOR UR: YELLOW
CREAT SERPL-MCNC: 0.65 MG/DL (ref 0.5–1.4)
CREAT SERPL-MCNC: 0.69 MG/DL (ref 0.5–1.4)
EKG IMPRESSION: NORMAL
ERYTHROCYTE [DISTWIDTH] IN BLOOD BY AUTOMATED COUNT: 48.5 FL (ref 35.9–50)
ERYTHROCYTE [DISTWIDTH] IN BLOOD BY AUTOMATED COUNT: 48.8 FL (ref 35.9–50)
FENTANYL UR QL: POSITIVE
GFR SERPLBLD CREATININE-BSD FMLA CKD-EPI: 95 ML/MIN/1.73 M 2
GFR SERPLBLD CREATININE-BSD FMLA CKD-EPI: 96 ML/MIN/1.73 M 2
GLOBULIN SER CALC-MCNC: 2.5 G/DL (ref 1.9–3.5)
GLOBULIN SER CALC-MCNC: 2.6 G/DL (ref 1.9–3.5)
GLUCOSE SERPL-MCNC: 102 MG/DL (ref 65–99)
GLUCOSE SERPL-MCNC: 113 MG/DL (ref 65–99)
GLUCOSE UR STRIP.AUTO-MCNC: NEGATIVE MG/DL
HCT VFR BLD AUTO: 31.1 % (ref 42–52)
HCT VFR BLD AUTO: 33 % (ref 42–52)
HGB BLD-MCNC: 10.2 G/DL (ref 14–18)
HGB BLD-MCNC: 10.4 G/DL (ref 14–18)
KETONES UR STRIP.AUTO-MCNC: NEGATIVE MG/DL
LEUKOCYTE ESTERASE UR QL STRIP.AUTO: NEGATIVE
LV EJECT FRACT  99904: 75
MAGNESIUM SERPL-MCNC: 2 MG/DL (ref 1.5–2.5)
MAGNESIUM SERPL-MCNC: 2.1 MG/DL (ref 1.5–2.5)
MCH RBC QN AUTO: 29.9 PG (ref 27–33)
MCH RBC QN AUTO: 31 PG (ref 27–33)
MCHC RBC AUTO-ENTMCNC: 31.5 G/DL (ref 32.3–36.5)
MCHC RBC AUTO-ENTMCNC: 32.8 G/DL (ref 32.3–36.5)
MCV RBC AUTO: 94.5 FL (ref 81.4–97.8)
MCV RBC AUTO: 94.8 FL (ref 81.4–97.8)
METHADONE UR QL SCN: NEGATIVE
MICRO URNS: NORMAL
NITRITE UR QL STRIP.AUTO: NEGATIVE
OPIATES UR QL SCN: NEGATIVE
OXYCODONE UR QL SCN: NEGATIVE
PCP UR QL SCN: NEGATIVE
PH UR STRIP.AUTO: 5.5 [PH] (ref 5–8)
PHOSPHATE SERPL-MCNC: 1.9 MG/DL (ref 2.5–4.5)
PHOSPHATE SERPL-MCNC: 2.1 MG/DL (ref 2.5–4.5)
PLATELET # BLD AUTO: 161 K/UL (ref 164–446)
PLATELET # BLD AUTO: 169 K/UL (ref 164–446)
PMV BLD AUTO: 9.5 FL (ref 9–12.9)
PMV BLD AUTO: 9.7 FL (ref 9–12.9)
POTASSIUM SERPL-SCNC: 4.1 MMOL/L (ref 3.6–5.5)
POTASSIUM SERPL-SCNC: 4.4 MMOL/L (ref 3.6–5.5)
PROPOXYPH UR QL SCN: NEGATIVE
PROT SERPL-MCNC: 5.6 G/DL (ref 6–8.2)
PROT SERPL-MCNC: 5.8 G/DL (ref 6–8.2)
PROT UR QL STRIP: NEGATIVE MG/DL
RBC # BLD AUTO: 3.29 M/UL (ref 4.7–6.1)
RBC # BLD AUTO: 3.48 M/UL (ref 4.7–6.1)
RBC UR QL AUTO: NEGATIVE
SODIUM SERPL-SCNC: 136 MMOL/L (ref 135–145)
SODIUM SERPL-SCNC: 137 MMOL/L (ref 135–145)
SP GR UR STRIP.AUTO: 1.02
UROBILINOGEN UR STRIP.AUTO-MCNC: 1 EU/DL
WBC # BLD AUTO: 13 K/UL (ref 4.8–10.8)
WBC # BLD AUTO: 13.5 K/UL (ref 4.8–10.8)

## 2025-04-16 PROCEDURE — 80053 COMPREHEN METABOLIC PANEL: CPT

## 2025-04-16 PROCEDURE — 700102 HCHG RX REV CODE 250 W/ 637 OVERRIDE(OP)

## 2025-04-16 PROCEDURE — 700102 HCHG RX REV CODE 250 W/ 637 OVERRIDE(OP): Performed by: STUDENT IN AN ORGANIZED HEALTH CARE EDUCATION/TRAINING PROGRAM

## 2025-04-16 PROCEDURE — 81003 URINALYSIS AUTO W/O SCOPE: CPT

## 2025-04-16 PROCEDURE — 770022 HCHG ROOM/CARE - ICU (200)

## 2025-04-16 PROCEDURE — 700105 HCHG RX REV CODE 258: Performed by: INTERNAL MEDICINE

## 2025-04-16 PROCEDURE — 700111 HCHG RX REV CODE 636 W/ 250 OVERRIDE (IP): Mod: JZ

## 2025-04-16 PROCEDURE — 85027 COMPLETE CBC AUTOMATED: CPT

## 2025-04-16 PROCEDURE — 93010 ELECTROCARDIOGRAM REPORT: CPT | Performed by: INTERNAL MEDICINE

## 2025-04-16 PROCEDURE — 71045 X-RAY EXAM CHEST 1 VIEW: CPT

## 2025-04-16 PROCEDURE — A9270 NON-COVERED ITEM OR SERVICE: HCPCS | Performed by: STUDENT IN AN ORGANIZED HEALTH CARE EDUCATION/TRAINING PROGRAM

## 2025-04-16 PROCEDURE — 93306 TTE W/DOPPLER COMPLETE: CPT | Mod: 26 | Performed by: INTERNAL MEDICINE

## 2025-04-16 PROCEDURE — 700101 HCHG RX REV CODE 250: Performed by: INTERNAL MEDICINE

## 2025-04-16 PROCEDURE — 93306 TTE W/DOPPLER COMPLETE: CPT

## 2025-04-16 PROCEDURE — 84100 ASSAY OF PHOSPHORUS: CPT

## 2025-04-16 PROCEDURE — 83735 ASSAY OF MAGNESIUM: CPT

## 2025-04-16 PROCEDURE — 99292 CRITICAL CARE ADDL 30 MIN: CPT | Mod: GC | Performed by: INTERNAL MEDICINE

## 2025-04-16 PROCEDURE — 99291 CRITICAL CARE FIRST HOUR: CPT | Performed by: STUDENT IN AN ORGANIZED HEALTH CARE EDUCATION/TRAINING PROGRAM

## 2025-04-16 PROCEDURE — 93005 ELECTROCARDIOGRAM TRACING: CPT | Mod: TC | Performed by: STUDENT IN AN ORGANIZED HEALTH CARE EDUCATION/TRAINING PROGRAM

## 2025-04-16 PROCEDURE — 99222 1ST HOSP IP/OBS MODERATE 55: CPT | Performed by: INTERNAL MEDICINE

## 2025-04-16 PROCEDURE — 700111 HCHG RX REV CODE 636 W/ 250 OVERRIDE (IP): Performed by: INTERNAL MEDICINE

## 2025-04-16 PROCEDURE — 80307 DRUG TEST PRSMV CHEM ANLYZR: CPT

## 2025-04-16 RX ORDER — AMOXICILLIN 250 MG
2 CAPSULE ORAL EVERY EVENING
Status: DISCONTINUED | OUTPATIENT
Start: 2025-04-16 | End: 2025-04-18

## 2025-04-16 RX ORDER — ACETAMINOPHEN 500 MG
1000 TABLET ORAL EVERY 6 HOURS PRN
Status: DISCONTINUED | OUTPATIENT
Start: 2025-04-21 | End: 2025-04-18

## 2025-04-16 RX ORDER — OXYCODONE HYDROCHLORIDE 5 MG/1
2.5 TABLET ORAL EVERY 6 HOURS PRN
Refills: 0 | Status: DISCONTINUED | OUTPATIENT
Start: 2025-04-16 | End: 2025-04-23 | Stop reason: HOSPADM

## 2025-04-16 RX ORDER — HALOPERIDOL 5 MG/ML
1 INJECTION INTRAMUSCULAR EVERY 4 HOURS PRN
Status: DISCONTINUED | OUTPATIENT
Start: 2025-04-16 | End: 2025-04-19

## 2025-04-16 RX ORDER — NOREPINEPHRINE BITARTRATE 0.03 MG/ML
0-1 INJECTION, SOLUTION INTRAVENOUS CONTINUOUS
Status: DISCONTINUED | OUTPATIENT
Start: 2025-04-16 | End: 2025-04-17

## 2025-04-16 RX ORDER — RASAGILINE 1 MG/1
0.5 TABLET ORAL DAILY
Status: DISCONTINUED | OUTPATIENT
Start: 2025-04-16 | End: 2025-04-23 | Stop reason: HOSPADM

## 2025-04-16 RX ORDER — OXYCODONE HYDROCHLORIDE 5 MG/1
5 TABLET ORAL EVERY 6 HOURS PRN
Refills: 0 | Status: DISCONTINUED | OUTPATIENT
Start: 2025-04-16 | End: 2025-04-23 | Stop reason: HOSPADM

## 2025-04-16 RX ORDER — MAGNESIUM SULFATE HEPTAHYDRATE 40 MG/ML
2 INJECTION, SOLUTION INTRAVENOUS ONCE
Status: COMPLETED | OUTPATIENT
Start: 2025-04-16 | End: 2025-04-16

## 2025-04-16 RX ORDER — CARBIDOPA AND LEVODOPA 25; 100 MG/1; MG/1
2 TABLET ORAL 3 TIMES DAILY
Status: DISCONTINUED | OUTPATIENT
Start: 2025-04-16 | End: 2025-04-23 | Stop reason: HOSPADM

## 2025-04-16 RX ORDER — ONDANSETRON 2 MG/ML
4 INJECTION INTRAMUSCULAR; INTRAVENOUS EVERY 4 HOURS PRN
Status: ACTIVE | OUTPATIENT
Start: 2025-04-16 | End: 2025-04-16

## 2025-04-16 RX ORDER — DEXMEDETOMIDINE HYDROCHLORIDE 4 UG/ML
.1-1.5 INJECTION, SOLUTION INTRAVENOUS CONTINUOUS
Status: DISCONTINUED | OUTPATIENT
Start: 2025-04-16 | End: 2025-04-20

## 2025-04-16 RX ORDER — ASPIRIN 81 MG/1
81 TABLET ORAL DAILY
Status: DISCONTINUED | OUTPATIENT
Start: 2025-04-16 | End: 2025-04-23 | Stop reason: HOSPADM

## 2025-04-16 RX ORDER — POLYETHYLENE GLYCOL 3350 17 G/17G
1 POWDER, FOR SOLUTION ORAL
Status: DISCONTINUED | OUTPATIENT
Start: 2025-04-16 | End: 2025-04-18

## 2025-04-16 RX ORDER — HYDROMORPHONE HYDROCHLORIDE 1 MG/ML
0.25 INJECTION, SOLUTION INTRAMUSCULAR; INTRAVENOUS; SUBCUTANEOUS EVERY 4 HOURS PRN
Status: DISCONTINUED | OUTPATIENT
Start: 2025-04-16 | End: 2025-04-23 | Stop reason: HOSPADM

## 2025-04-16 RX ORDER — THIAMINE HYDROCHLORIDE 100 MG/ML
200 INJECTION, SOLUTION INTRAMUSCULAR; INTRAVENOUS 2 TIMES DAILY
Status: DISPENSED | OUTPATIENT
Start: 2025-04-16 | End: 2025-04-19

## 2025-04-16 RX ORDER — PRAVASTATIN SODIUM 20 MG
40 TABLET ORAL NIGHTLY
Status: DISCONTINUED | OUTPATIENT
Start: 2025-04-16 | End: 2025-04-23 | Stop reason: HOSPADM

## 2025-04-16 RX ORDER — MAGNESIUM SULFATE HEPTAHYDRATE 40 MG/ML
2 INJECTION, SOLUTION INTRAVENOUS ONCE
Status: DISCONTINUED | OUTPATIENT
Start: 2025-04-16 | End: 2025-04-16

## 2025-04-16 RX ORDER — ACETAMINOPHEN 325 MG/1
650 TABLET ORAL EVERY 6 HOURS PRN
Status: ACTIVE | OUTPATIENT
Start: 2025-04-16 | End: 2025-04-16

## 2025-04-16 RX ORDER — ACETAMINOPHEN 500 MG
1000 TABLET ORAL EVERY 6 HOURS
Status: DISCONTINUED | OUTPATIENT
Start: 2025-04-16 | End: 2025-04-18

## 2025-04-16 RX ADMIN — MAGNESIUM SULFATE IN WATER FOR 2 G: 40 INJECTION INTRAVENOUS at 08:12

## 2025-04-16 RX ADMIN — DEXMEDETOMIDINE 1.5 MCG/KG/HR: 100 INJECTION, SOLUTION INTRAVENOUS at 21:16

## 2025-04-16 RX ADMIN — THIAMINE HYDROCHLORIDE 200 MG: 100 INJECTION, SOLUTION INTRAMUSCULAR; INTRAVENOUS at 10:16

## 2025-04-16 RX ADMIN — HYDROMORPHONE HYDROCHLORIDE 0.25 MG: 1 INJECTION, SOLUTION INTRAMUSCULAR; INTRAVENOUS; SUBCUTANEOUS at 21:12

## 2025-04-16 RX ADMIN — APIXABAN 5 MG: 5 TABLET, FILM COATED ORAL at 06:04

## 2025-04-16 RX ADMIN — ROTIGOTINE 8 MG: 8 PATCH, EXTENDED RELEASE TRANSDERMAL at 10:14

## 2025-04-16 RX ADMIN — DEXMEDETOMIDINE 0.2 MCG/KG/HR: 100 INJECTION, SOLUTION INTRAVENOUS at 08:37

## 2025-04-16 RX ADMIN — RASAGILINE 0.5 MG: 1 TABLET ORAL at 06:26

## 2025-04-16 RX ADMIN — SODIUM PHOSPHATE, MONOBASIC, MONOHYDRATE AND SODIUM PHOSPHATE, DIBASIC, ANHYDROUS 30 MMOL: 276; 142 INJECTION, SOLUTION INTRAVENOUS at 07:53

## 2025-04-16 RX ADMIN — HALOPERIDOL LACTATE 1 MG: 5 INJECTION, SOLUTION INTRAMUSCULAR at 21:26

## 2025-04-16 RX ADMIN — DEXMEDETOMIDINE 1.3 MCG/KG/HR: 100 INJECTION, SOLUTION INTRAVENOUS at 13:11

## 2025-04-16 RX ADMIN — CARBIDOPA AND LEVODOPA 2 TABLET: 25; 100 TABLET ORAL at 06:25

## 2025-04-16 RX ADMIN — ASPIRIN 81 MG: 81 TABLET, COATED ORAL at 06:04

## 2025-04-16 ASSESSMENT — CHA2DS2 SCORE
AGE 65 TO 74: NO
DIABETES: NO
VASCULAR DISEASE: NO
PRIOR STROKE OR TIA OR THROMBOEMBOLISM: NO
AGE 75 OR GREATER: YES
CHF OR LEFT VENTRICULAR DYSFUNCTION: NO
CHA2DS2 VASC SCORE: 2
SEX: MALE
HYPERTENSION: NO

## 2025-04-16 ASSESSMENT — COGNITIVE AND FUNCTIONAL STATUS - GENERAL
DRESSING REGULAR UPPER BODY CLOTHING: A LOT
MOBILITY SCORE: 18
MOVING FROM LYING ON BACK TO SITTING ON SIDE OF FLAT BED: A LITTLE
STANDING UP FROM CHAIR USING ARMS: A LITTLE
EATING MEALS: A LITTLE
PERSONAL GROOMING: A LITTLE
TOILETING: A LITTLE
HELP NEEDED FOR BATHING: A LITTLE
WALKING IN HOSPITAL ROOM: A LITTLE
TURNING FROM BACK TO SIDE WHILE IN FLAT BAD: A LITTLE
DRESSING REGULAR LOWER BODY CLOTHING: A LITTLE
SUGGESTED CMS G CODE MODIFIER DAILY ACTIVITY: CK
CLIMB 3 TO 5 STEPS WITH RAILING: A LITTLE
SUGGESTED CMS G CODE MODIFIER MOBILITY: CK
MOVING TO AND FROM BED TO CHAIR: A LITTLE
DAILY ACTIVITIY SCORE: 17

## 2025-04-16 ASSESSMENT — ENCOUNTER SYMPTOMS
CHILLS: 0
MUSCULOSKELETAL NEGATIVE: 1
DIZZINESS: 0
PALPITATIONS: 0
FEVER: 0
VOMITING: 0
ABDOMINAL PAIN: 0
FOCAL WEAKNESS: 0
SHORTNESS OF BREATH: 0
EYES NEGATIVE: 1
MYALGIAS: 1
SORE THROAT: 0
SPUTUM PRODUCTION: 0
NAUSEA: 0
HEADACHES: 0

## 2025-04-16 ASSESSMENT — PAIN DESCRIPTION - PAIN TYPE
TYPE: ACUTE PAIN

## 2025-04-16 ASSESSMENT — LIFESTYLE VARIABLES
TOTAL SCORE: 0
ON A TYPICAL DAY WHEN YOU DRINK ALCOHOL HOW MANY DRINKS DO YOU HAVE: 1
EVER HAD A DRINK FIRST THING IN THE MORNING TO STEADY YOUR NERVES TO GET RID OF A HANGOVER: NO
HAVE YOU EVER FELT YOU SHOULD CUT DOWN ON YOUR DRINKING: NO
DOES PATIENT WANT TO STOP DRINKING: NO
AVERAGE NUMBER OF DAYS PER WEEK YOU HAVE A DRINK CONTAINING ALCOHOL: 5
TOTAL SCORE: 0
HOW MANY TIMES IN THE PAST YEAR HAVE YOU HAD 5 OR MORE DRINKS IN A DAY: 0
EVER FELT BAD OR GUILTY ABOUT YOUR DRINKING: NO
ALCOHOL_USE: YES
HAVE PEOPLE ANNOYED YOU BY CRITICIZING YOUR DRINKING: NO
CONSUMPTION TOTAL: NEGATIVE
TOTAL SCORE: 0

## 2025-04-16 ASSESSMENT — PATIENT HEALTH QUESTIONNAIRE - PHQ9
2. FEELING DOWN, DEPRESSED, IRRITABLE, OR HOPELESS: NOT AT ALL
1. LITTLE INTEREST OR PLEASURE IN DOING THINGS: NOT AT ALL
SUM OF ALL RESPONSES TO PHQ9 QUESTIONS 1 AND 2: 0

## 2025-04-16 NOTE — ASSESSMENT & PLAN NOTE
Formal ECHO ordered  Cardiology consult tomorrow after ECHO    Maintain euvolemia  Levophed for MAP > 65  Optimize filling pressures

## 2025-04-16 NOTE — PROGRESS NOTES
Patient's wife, Navya, at bedside. Oriented to room and unit. Updated on current medications, including Precedex gtt use and purpose.

## 2025-04-16 NOTE — PROGRESS NOTES
[facility] DIRECT ADMIT ACCEPTANCE NOTE    Transferring facility: [facility]  Transferring provider: Florencio    Chief complaint: Hypotension, AS  Pertinent history & patient course: 80 year old male with PMHx mod-severe AS, atrial fib on eliquis, Parkinsons - recently moved from [place] to [place] - fell and broke his shoulder on 4/13 s/p repair 4/14. Entered AF yesterday, today SBP began dropping to 80s despite IVF. He is asymptomatic; HR is normal rate, hbg overall stable, no signs of infection. ECHO there shows moderate concentric LVH with reported severe AS. Physician at [facility] is concerned he may become significantly more unstable and they will not have a means by which to support him.     Pertinent imaging & lab results: see epic  Consultants called prior to transfer and pertinent input from consultants: [facility]  Code Status: Full per transferring provider, I personally verified with the transferring provider patient's code status and the transferring provider has confirmed this with the patient.  Reason for Transfer: higher level of care  Further work up or recommendations requested prior to transfer: Levophed for SBP support    Patient accepted for transfer: Yes  [facility] Nursing to notify the Triage Coordinator in the RTOC via Voalte or Phone ext. [phone] when patient arrives to the unit. The Triage Coordinator will assign the admitting provider.    Consultants to be called upon arrival: Cardiology if the patient is unstable, Thania is on overnight   Admission status: Inpatient.   Floor requested: CIC    The admitting provider is the point of contact for questions or concerns regarding the patient's care.

## 2025-04-16 NOTE — CARE PLAN
The patient is Watcher - Medium risk of patient condition declining or worsening    Shift Goals  Clinical Goals: hemodyncamic stability, decreased agitation  Patient Goals: go home  Family Goals: have orthopedic doctor assess patient    Progress made toward(s) clinical / shift goals:  HR decreased, agitation improved with precedex  Problem: Pain - Standard  Goal: Alleviation of pain or a reduction in pain to the patient’s comfort goal  Outcome: Progressing     Problem: Fall Risk  Goal: Patient will remain free from falls  Outcome: Progressing     Problem: Skin Integrity  Goal: Skin integrity is maintained or improved  Outcome: Progressing     Problem: Respiratory  Goal: Patient will achieve/maintain optimum respiratory ventilation and gas exchange  Outcome: Progressing       Patient is not progressing towards the following goals:      Problem: Knowledge Deficit - Standard  Goal: Patient and family/care givers will demonstrate understanding of plan of care, disease process/condition, diagnostic tests and medications  Outcome: Not Progressing

## 2025-04-16 NOTE — PROGRESS NOTES
4 Eyes Skin Assessment Completed by AVA Blum and AVA Walters.    Head Blanching and Redness/ rash forehead  Ears Redness and Blanching  Nose WDL  Mouth WDL  Neck WDL  Breast/Chest Redness, Blanching, and Rash  Shoulder Blades Access site with medication on right shoulder, left shoulder incision with grey foam dressing (unable to view under dressing)   Spine WDL  (R) Arm/Elbow/Hand WDL  (L) Arm/Elbow/Hand Redness, blanching elbows, bruising on axillary area  Abdomen WDL  Groin WDL  Scrotum/Coccyx/Buttocks WDL  (R) Leg WDL  (L) Leg Scab prior surgery  (R) Heel/Foot/Toe WDL  (L) Heel/Foot/Toe WDL          Devices In Places ECG, Blood Pressure Cuff, Pulse Ox, SCD's, and Nasal Cannula      Interventions In Place TAP System, Pillows, Q2 Turns, Low Air Loss Mattress, Dri-Eduin Pads, and Heels Loaded W/Pillows    Possible Skin Injury No    Pictures Uploaded Into Epic N/A  Wound Consult Placed N/A  RN Wound Prevention Protocol Ordered Yes

## 2025-04-16 NOTE — PROGRESS NOTES
4 Eyes Skin Assessment Completed by AVA Huntley and AVA Romeo.    Head Rash to forehead  Ears Redness and Blanching  Nose WDL  Mouth WDL  Neck WDL  Breast/Chest Redness, Blanching, and Rash  Shoulder Blades Medication access point for nerve block, surgical incision   Spine WDL  (R) Arm/Elbow/Hand WDL  (L) Arm/Elbow/Hand Redness, Blanching, Bruising, and Swelling, bruising to 2nd and 3rd finger from dislocation   Abdomen Redness and Blanching, rash from parkinson's medication patch   Groin WDL  Scrotum/Coccyx/Buttocks WDL  (R) Leg WDL  (L) Leg WDL, scab  (R) Heel/Foot/Toe WDL  (L) Heel/Foot/Toe WDL          Devices In Places Tele Box, Blood Pressure Cuff, Pulse Ox, and Nasal Cannula      Interventions In Place Gray Ear Foams, TAP System, Pillows, Q2 Turns, Heels Loaded W/Pillows, and Pressure Redistribution Mattress    Possible Skin Injury No    Pictures Uploaded Into Epic N/A  Wound Consult Placed N/A  RN Wound Prevention Protocol Ordered Yes

## 2025-04-16 NOTE — ASSESSMENT & PLAN NOTE
Suspect secondary to severe AS s/p operation now in AF    Rhythm control  Formal ECHO  Maintain euvolemia  Levophed for MAP > 65

## 2025-04-16 NOTE — PROGRESS NOTES
At the start of shift, patient's agitation and confusion began escalating. Unable to redirect patient and keep him safely in bad. Patient repeatedly attempting to climb out of bed and remove monitoring. Patient's family called and updated, spoke with family via speaker phone, they attempted to redirect patient, but efforts were minimally successful. MD aware, new orders received and implemented.

## 2025-04-16 NOTE — CONSULTS
Cardiology Consult Note:    Danitza Guevara M.D.  Date & Time note created:    4/16/2025   3:39 PM     Referring provider:   / TYRONE/ STEVIE / MICHELLE Woods.    Patient ID:   Name:             Osman Choi   YOB: 1945  Age:                 80 y.o.  male   MRN:               [id number]                                                             Chief Complaint / Reason for consult:  Atrial fibrillation, Aortic stenosis, hypotension.    History of Present Illness:    This is a 72 years old man with history of Parkinson's, aortic stenosis, atrial fibrillation on Eliquis, recent fall with shoulder fracture status post repair 2 days ago, presented to [facility] for further care from outside facility.  Patient was concerned due to uncontrolled atrial fibrillation in terms of rate.  He also had some hypotension episodes and was started on Levophed.    Patient is currently in atrial fibrillation with rapid ventricular rate.  I personally interpreted the EKG tracing.    I personally interpreted blood test result which showed GFR of 96, potassium of 4.1.    Review of Systems:      Patient is communicative however, he appears to be agitated and somewhat confused.  He denied having chest pain or shortness of breath at this time.                Past Medical History:   Past Medical History:   Diagnosis Date    Parkinson disease ([facility])      Active Hospital Problems    Diagnosis     Atrial fibrillation (HCC) [I48.91]     Shoulder fracture [S42.90XA]     Hypotension [I95.9]     HLD (hyperlipidemia) [E78.5]     Parkinson's disease with dyskinesia without fluctuating manifestations (HCC) [G20.B1]        Past Surgical History:  Past Surgical History:   Procedure Laterality Date    ORIF, SHOULDER         Hospital Medications:    Current Facility-Administered Medications:     norepinephrine (Levophed) 8 mg in 250 mL NS infusion (premix), 0-1 mcg/kg/min (Ideal), Intravenous, Continuous, Ilya Feliciano M.D., Dose not  Required at 04/16/25 0245    MD Alert...ICU Electrolyte Replacement per Pharmacy, , Other, PHARMACY TO DOSE, Ilya Feliciano M.D.    apixaban (Eliquis) tablet 5 mg, 5 mg, Oral, BID, Ilya Feliciano M.D., 5 mg at 04/16/25 0604    aspirin EC tablet 81 mg, 81 mg, Oral, DAILY, Ilya Feliciano M.D., 81 mg at 04/16/25 0604    carbidopa-levodopa (Sinemet)  MG tablet 2 Tablet, 2 Tablet, Oral, TID, Ilya Feliciano M.D., 2 Tablet at 04/16/25 0625    pravastatin (Pravachol) tablet 40 mg, 40 mg, Oral, Nightly, Ilya Feliciano M.D.    rasagiline (Azilect) 1 MG tablet TABS 0.5 mg, 0.5 mg, Oral, DAILY, Ilya Feliciano M.D., 0.5 mg at 04/16/25 0626    dexmedetomidine (Precedex) 400 mcg/100mL infusion, 0.1-1.5 mcg/kg/hr (Ideal), Intravenous, Continuous, Kike Woods M.D., Last Rate: 5.5 mL/hr at 04/16/25 1522, 0.3 mcg/kg/hr at 04/16/25 1522    thiamine (B-1) injection 200 mg, 200 mg, Intravenous, BID, Kike Woods M.D., 200 mg at 04/16/25 1016    senna-docusate (Pericolace Or Senokot S) 8.6-50 MG per tablet 2 Tablet, 2 Tablet, Oral, Q EVENING **AND** polyethylene glycol/lytes (Miralax) Packet 1 Packet, 1 Packet, Oral, QDAY PRN, Kike Woods M.D.    Pharmacy Consult Request ...Pain Management Review 1 Each, 1 Each, Other, PHARMACY TO DOSE, Prince ELIDA Kline D.O.    acetaminophen (Tylenol) tablet 1,000 mg, 1,000 mg, Oral, Q6HRS **FOLLOWED BY** [START ON 4/21/2025] acetaminophen (Tylenol) tablet 1,000 mg, 1,000 mg, Oral, Q6HRS PRN, Prince ELIDA Kline D.O.    oxyCODONE immediate-release (Roxicodone) tablet 2.5 mg, 2.5 mg, Oral, Q6HRS PRN **OR** oxyCODONE immediate-release (Roxicodone) tablet 5 mg, 5 mg, Oral, Q6HRS PRN **OR** HYDROmorphone (Dilaudid) injection 0.25 mg, 0.25 mg, Intravenous, Q4HRS PRN, Prince ELIDA Kline D.O.    Rotigotine PT24 8 mg, 8 mg, Transdermal, DAILY, Prince ELIDA Kline D.O., 8 mg at 04/16/25 1014    ropivacaine 0.2 % (Naropin) 745 mL in On-Q Pump infusion, , Other, Continuous, Kike Woods M.D.    Current  Outpatient Medications:  Facility-Administered Medications Prior to Admission   Medication Dose Route Frequency Provider Last Rate Last Admin    onabotulinum toxin type A (Botox) injection 100 Units  100 Units Injection Q90 DAYS    100 Units at 04/11/25 1438     Medications Prior to Admission   Medication Sig Dispense Refill Last Dose/Taking    Rotigotine (NEUPRO) 8 MG/24HR PATCH 24 HR Place 8 mg on the skin every day. For Parkinson's 30 Patch 11 4/15/2025 Morning    Rasagiline Mesylate 0.5 MG Tab Take 1 Tablet by mouth every day. For Parkinson's 90 Tablet 3 4/15/2025 Morning    carbidopa-levodopa (SINEMET)  MG Tab Take 2 Tablets by mouth 3 times a day. For Parkinson's 540 Tablet 3 4/15/2025 Morning    apixaban (ELIQUIS) 5mg Tab Take 5 mg by mouth 2 times a day.   4/15/2025 Morning    pravastatin (PRAVACHOL) 40 MG tablet Take 40 mg by mouth every evening.   4/14/2025    Cyanocobalamin (B-12) 1000 MCG Tab Take 1 Tablet by mouth every day.   4/15/2025 Morning    Cholecalciferol (D3 2000 PO) Take 1 Tablet by mouth every day.   4/15/2025 Morning    aspirin 81 MG EC tablet Take 81 mg by mouth every day.   4/15/2025 Morning    propafenone (RYTHMOL) 150 MG Tab Take 150 mg by mouth every 8 hours.   4/14/2025    alendronate (FOSAMAX) 70 MG Tab Take 70 mg by mouth every 7 days.          Medication Allergy:  No Known Allergies    Family History:  No family history on file.    Social History:  Social History     Socioeconomic History    Marital status:      Spouse name: Not on file    Number of children: Not on file    Years of education: Not on file    Highest education level: Not on file   Occupational History    Not on file   Tobacco Use    Smoking status: Never    Smokeless tobacco: Never   Vaping Use    Vaping status: Never Used   Substance and Sexual Activity    Alcohol use: Yes     Alcohol/week: 8.4 oz     Types: 14 Glasses of wine per week    Drug use: Never    Sexual activity: Not on file   Other Topics  "Concern    Not on file   Social History Narrative    Not on file     Social Drivers of Health     Financial Resource Strain: Not on file   Food Insecurity: No Food Insecurity (4/14/2025)    Received from [facility] (and [facility] prior to 7/1/2021)    Food Insecurity     Social/Environmental Concerns: No concerns   Transportation Needs: No Transportation Needs (4/14/2025)    Received from [facility] (and [facility] prior to 7/1/2021)    Transportation Needs     Social/Environmental Concerns: No concerns   Physical Activity: Not on file   Stress: Not on file   Social Connections: Feeling Socially Integrated (7/30/2024)    Received from [facility] (and [facility] prior to 7/1/2021)    OASIS : Social Isolation     Frequency of experiencing loneliness or isolation: Never   Intimate Partner Violence: Not At Risk (4/16/2025)    Humiliation, Afraid, Rape, and Kick questionnaire     Fear of Current or Ex-Partner: No     Emotionally Abused: No     Physically Abused: No     Sexually Abused: No   Housing Stability: Low Risk  (4/14/2025)    Received from [facility] (and [facility] prior to 7/1/2021)    Housing Stability     Social/Environmental Concerns: No concerns         Physical Exam:  Vitals/ General Appearance:   Weight/BMI: Body mass index is 26.29 kg/m².  /89   Pulse 84   Temp 36.1 °C (97 °F) (Temporal)   Resp 20   Ht 1.778 m (5' 10\")   Wt 83.1 kg (183 lb 3.2 oz)   SpO2 96%   Vitals:    04/16/25 1007 04/16/25 1100 04/16/25 1200 04/16/25 1300   BP: 103/85  103/69 120/89   Pulse: (!) 120 (!) 128 78 84   Resp: 20 20 20 20   Temp:   36.1 °C (97 °F)    TempSrc:   Temporal    SpO2: 90% 95% 96% 96%   Weight:       Height:         Oxygen Therapy:  Pulse Oximetry: 96 %, O2 (LPM): 2, O2 Delivery Device: Nasal " Cannula    Constitutional:   No acute distress  HENMT:  Normocephalic, Atraumatic.  Eyes:  EOMI, No discharge.  Neck:  no JVD.  Cardiovascular:  There is presence of an irregularly irregular heartbeats.  Extremitites with intact distal pulses, no cyanosis, or edema.  Lungs:  No respiratory distress.  Abdomen: Soft, No tenderness, No guarding, No rebound.  Skin: No significant rash.  Neurologic: Awake but agitated  Psychiatric: Agitated      MDM (Data Review):     Records reviewed and summarized in current documentation    Lab Data Review:  Recent Results (from the past 24 hours)   CBC WITHOUT DIFFERENTIAL    Collection Time: 04/15/25  6:45 PM   Result Value Ref Range    WBC 13.7 (H) 4.0 - 10.0 K/uL    RBC 3.31 (L) 4.70 - 6.10 M/uL    Hemoglobin 10.2 (L) 14.0 - 18.0 g/dL    Hematocrit 30 (L) 42.0 - 54.0 %    MCV 90.6 81.0 - 100.0 fL    MCH 30.8 26.0 - 34.0 pg    MCHC 34 33.0 - 37.0 g/dL    Platelet Count 176 130 - 400 K/uL    MPV 9.7 8.5 - 12.5 fL    RDW 13.9 11.5 - 14.5 %   CBC WITHOUT DIFFERENTIAL    Collection Time: 04/16/25  2:50 AM   Result Value Ref Range    WBC 13.5 (H) 4.8 - 10.8 K/uL    RBC 3.48 (L) 4.70 - 6.10 M/uL    Hemoglobin 10.4 (L) 14.0 - 18.0 g/dL    Hematocrit 33.0 (L) 42.0 - 52.0 %    MCV 94.8 81.4 - 97.8 fL    MCH 29.9 27.0 - 33.0 pg    MCHC 31.5 (L) 32.3 - 36.5 g/dL    RDW 48.8 35.9 - 50.0 fL    Platelet Count 169 164 - 446 K/uL    MPV 9.5 9.0 - 12.9 fL   Comp Metabolic Panel    Collection Time: 04/16/25  2:50 AM   Result Value Ref Range    Sodium 136 135 - 145 mmol/L    Potassium 4.4 3.6 - 5.5 mmol/L    Chloride 105 96 - 112 mmol/L    Co2 20 20 - 33 mmol/L    Anion Gap 11.0 7.0 - 16.0    Glucose 102 (H) 65 - 99 mg/dL    Bun 15 8 - 22 mg/dL    Creatinine 0.69 0.50 - 1.40 mg/dL    Calcium 8.3 (L) 8.5 - 10.5 mg/dL    Correct Calcium 8.9 8.5 - 10.5 mg/dL    AST(SGOT) 20 12 - 45 U/L    ALT(SGPT) 6 2 - 50 U/L    Alkaline Phosphatase 39 30 - 99 U/L    Total Bilirubin 0.6 0.1 - 1.5 mg/dL    Albumin 3.2  3.2 - 4.9 g/dL    Total Protein 5.8 (L) 6.0 - 8.2 g/dL    Globulin 2.6 1.9 - 3.5 g/dL    A-G Ratio 1.2 g/dL   MAGNESIUM    Collection Time: 04/16/25  2:50 AM   Result Value Ref Range    Magnesium 2.1 1.5 - 2.5 mg/dL   PHOSPHORUS    Collection Time: 04/16/25  2:50 AM   Result Value Ref Range    Phosphorus 2.1 (L) 2.5 - 4.5 mg/dL   ESTIMATED GFR    Collection Time: 04/16/25  2:50 AM   Result Value Ref Range    GFR (CKD-EPI) 95 >60 mL/min/1.73 m 2   CBC WITHOUT DIFFERENTIAL    Collection Time: 04/16/25  4:29 AM   Result Value Ref Range    WBC 13.0 (H) 4.8 - 10.8 K/uL    RBC 3.29 (L) 4.70 - 6.10 M/uL    Hemoglobin 10.2 (L) 14.0 - 18.0 g/dL    Hematocrit 31.1 (L) 42.0 - 52.0 %    MCV 94.5 81.4 - 97.8 fL    MCH 31.0 27.0 - 33.0 pg    MCHC 32.8 32.3 - 36.5 g/dL    RDW 48.5 35.9 - 50.0 fL    Platelet Count 161 (L) 164 - 446 K/uL    MPV 9.7 9.0 - 12.9 fL   Comp Metabolic Panel    Collection Time: 04/16/25  4:29 AM   Result Value Ref Range    Sodium 137 135 - 145 mmol/L    Potassium 4.1 3.6 - 5.5 mmol/L    Chloride 106 96 - 112 mmol/L    Co2 20 20 - 33 mmol/L    Anion Gap 11.0 7.0 - 16.0    Glucose 113 (H) 65 - 99 mg/dL    Bun 14 8 - 22 mg/dL    Creatinine 0.65 0.50 - 1.40 mg/dL    Calcium 8.0 (L) 8.5 - 10.5 mg/dL    Correct Calcium 8.7 8.5 - 10.5 mg/dL    AST(SGOT) 19 12 - 45 U/L    ALT(SGPT) 6 2 - 50 U/L    Alkaline Phosphatase 32 30 - 99 U/L    Total Bilirubin 0.7 0.1 - 1.5 mg/dL    Albumin 3.1 (L) 3.2 - 4.9 g/dL    Total Protein 5.6 (L) 6.0 - 8.2 g/dL    Globulin 2.5 1.9 - 3.5 g/dL    A-G Ratio 1.2 g/dL   MAGNESIUM    Collection Time: 04/16/25  4:29 AM   Result Value Ref Range    Magnesium 2.0 1.5 - 2.5 mg/dL   PHOSPHORUS    Collection Time: 04/16/25  4:29 AM   Result Value Ref Range    Phosphorus 1.9 (L) 2.5 - 4.5 mg/dL   ESTIMATED GFR    Collection Time: 04/16/25  4:29 AM   Result Value Ref Range    GFR (CKD-EPI) 96 >60 mL/min/1.73 m 2   EC-ECHOCARDIOGRAM COMPLETE W/O CONT    Collection Time: 04/16/25 11:21 AM   Result  Value Ref Range    Left Ventrical Ejection Fraction 75    EKG    Collection Time: 25 11:24 AM   Result Value Ref Range    Report       [facility]    Test Date:  2025  Pt Name:    MERE AMIN                  Department: 161  MRN:        [id number]                      Room:       10  Gender:     Male                         Technician: RONALDGALE  :        1945                   Requested By:MICHELLE GREEN IV  Order #:    [id number]                    Reading MD: Yohannes Foote MD    Measurements  Intervals                                Axis  Rate:       117                          P:          0  PA:         0                            QRS:        2  QRSD:       101                          T:          21  QT:         319  QTc:        445    Interpretive Statements  Atrial fibrillation  No previous ECG available for comparison  Electronically Signed On 2025 11:24:01 PDT by Yohannes Foote MD     URINE DRUG SCREEN    Collection Time: 25 11:48 AM   Result Value Ref Range    Amphetamines Urine Negative Negative    Barbiturates Negative Negative    Benzodiazepines Negative Negative    Cocaine Metabolite Negative Negative    Fentanyl, Urine Positive (A) Negative    Methadone Negative Negative    Opiates Negative Negative    Oxycodone Negative Negative    Phencyclidine -Pcp Negative Negative    Propoxyphene Negative Negative    Cannabinoid Metab Negative Negative   URINALYSIS    Collection Time: 25 11:48 AM    Specimen: Urine, Clean Catch   Result Value Ref Range    Color Yellow     Character Clear     Specific Gravity 1.017 <1.035    Ph 5.5 5.0 - 8.0    Glucose Negative Negative mg/dL    Ketones Negative Negative mg/dL    Protein Negative Negative mg/dL    Bilirubin Negative Negative    Urobilinogen, Urine 1.0 <=1.0 EU/dL    Nitrite Negative Negative    Leukocyte Esterase Negative Negative    Occult Blood Negative Negative    Micro Urine Req see below         Imaging/Procedures Review:    Chest Xray:  Reviewed    EKG:   As in HPI documentation.    MDM (Assessment and Plan):     Active Hospital Problems    Diagnosis     Atrial fibrillation (HCC) [I48.91]     Shoulder fracture [S42.90XA]     Hypotension [I95.9]     HLD (hyperlipidemia) [E78.5]     Parkinson's disease with dyskinesia without fluctuating manifestations (HCC) [G20.B1]      At this time, rule out infectious source.  Continue management via primary team.  Will obtain a transthoracic echocardiogram to further assess nature of aortic stenosis.    Atrial fibrillation with rapid ventricular rate is secondary and reactive.  No indication for strict control of heart rate unless patient is hemodynamically unstable.  Sinus restoration is not beneficial at this point time.  The risk of recurrent back into atrial fibrillation is relatively high.  Adequate hydration. Avoid dehydration.  Patient does not appear to be in volume overloaded state at this time.  Okay to continue anticoagulation with Eliquis 5 mg twice a day.    Addendum:  I have independently interpreted and reviewed echocardiogram's actual images with patient which showed normal left ventricular systolic function. No wall motion abnormality. No evidence of pulmonary hypertension. No significant valvular disease.      Thank you for referring this patient to our cardiology service.  We will follow patient with you.  Danitza Guevara MD. [facility].  Cardiology Inpatient Service.  [facility].  [phone].  Gretel Danielson.     2 = A lot of assistance

## 2025-04-16 NOTE — ASSESSMENT & PLAN NOTE
OR at [facility] 4/14    Keep dressing in place until seen outpatient in ortho clinic, unless it is saturated (currently it is not saturated)    Ok to shower, keep out of direct spray  Keep fingers moving: makes a strong fist, hold and then release.  Repeat this process throughout the day    NO lifting, pushing / pulling or carrying with the left arm until cleared in ortho clinic

## 2025-04-16 NOTE — PROGRESS NOTES
Updated Dr. Woods and Dr. Kline that patient's wife is at bedside and is requesting ortho consult and repeat imaging of his shoulder.

## 2025-04-16 NOTE — PROGRESS NOTES
Wife called to updated RN that 2 years ago the patient had a bout of delirium and at the time he had a UTI. Dr. Kline updated, orders for UA received

## 2025-04-16 NOTE — PROGRESS NOTES
[facility] ICU Progress Note      Admit Date: 4/16/2025    Resident(s): Prince ELIDA Kline D.O.   Attending:  VERONICA MCKINNON/ Dr. Woods    Patient ID:    Name:  Osman Choi   YOB: 1945  Age:  80 y.o.  male   MRN:  [id number]    Hospital Course (carried forward and updated):  Osman Choi is a 80 y.o. male with moderate to severe aortic stenosis, atrial fibrillation on Eliquis, Parkinson's, recent ground level fall complicated by left closed fracture of left proximal humerus on 4/13 with repair on 4/14 who was transferred from [facility] to [facility] for severe aortic stenosis for emergent aortic valve repair.     He underwent a repair for left closed fracture on 4/14. His postoperative course was complicated by progressive hypotension unresponsive to fluids. Also, entered AF on 4/15. Echo presented critical aortic senosis with aortic valve area of 0.55 cm^2 and a mean gradient of 45 mm hg. He was transferred to [facility] for emergent aortic valve repair.    Consultants:  Critical Care  Cardiology    Interval Events:  No acute overnight events  Cardiologist consult  Echo pending      Vitals Range last 24h:  Temp:  [36.6 °C (97.9 °F)-36.8 °C (98.3 °F)] 36.8 °C (98.3 °F)  Pulse:  [112-149] 149  Resp:  [23-32] 28  BP: (108-123)/(74-91) 112/87  SpO2:  [92 %-98 %] 93 %      Intake/Output Summary (Last 24 hours) at 4/16/2025 0610  Last data filed at 4/16/2025 0454  Gross per 24 hour   Intake --   Output 480 ml   Net -480 ml        Review of Systems   Constitutional:  Negative for chills and fever.   Cardiovascular:  Negative for chest pain and palpitations.   Gastrointestinal:  Negative for abdominal pain, nausea and vomiting.   Genitourinary:  Negative for dysuria and frequency.   Musculoskeletal:  Positive for myalgias.        Mild pain on left shoulder on surgical site   Neurological:  Negative for dizziness and headaches.       PHYSICAL EXAM:  Vitals:    04/16/25 0300 04/16/25 0400 04/16/25 0500 04/16/25 0600   BP:  108/74 (!) 122/91 116/77 112/87   Pulse: (!) 114 (!) 133 (!) 127 (!) 149   Resp: (!) 32 (!) 30 (!) 23 (!) 28   Temp:  36.6 °C (97.9 °F)  36.8 °C (98.3 °F)   TempSrc:  Temporal  Temporal   SpO2: 95% 94% 92% 93%   Weight:       Height:        Body mass index is 26.29 kg/m².    O2 therapy: Pulse Oximetry: 93 %, O2 (LPM): 2, O2 Delivery Device: None - Room Air         Physical Exam  Constitutional:       General: He is not in acute distress.  HENT:      Head: Normocephalic and atraumatic.      Mouth/Throat:      Mouth: Mucous membranes are moist.      Pharynx: Oropharynx is clear. No oropharyngeal exudate.   Eyes:      Extraocular Movements: Extraocular movements intact.      Pupils: Pupils are equal, round, and reactive to light.   Cardiovascular:      Rate and Rhythm: Tachycardia present. Rhythm irregular.      Heart sounds: Murmur heard.   Pulmonary:      Effort: Pulmonary effort is normal. No respiratory distress.      Breath sounds: No wheezing.      Comments: Decreased respiratory effort  Abdominal:      General: Abdomen is flat. Bowel sounds are normal.      Tenderness: There is no abdominal tenderness. There is no guarding or rebound.   Musculoskeletal:      Right lower leg: No edema.      Left lower leg: No edema.   Skin:     General: Skin is warm and dry.   Neurological:      General: No focal deficit present.      Mental Status: He is alert and oriented to person, place, and time.             Recent Labs     04/16/25 0250 04/16/25 0429   SODIUM 136 137   POTASSIUM 4.4 4.1   CHLORIDE 105 106   CO2 20 20   BUN 15 14   CREATININE 0.69 0.65   MAGNESIUM 2.1 2.0   PHOSPHORUS 2.1* 1.9*   CALCIUM 8.3* 8.0*     Recent Labs     04/16/25  0250 04/16/25  0429   ALTSGPT 6 6   ASTSGOT 20 19   ALKPHOSPHAT 39 32   TBILIRUBIN 0.6 0.7   GLUCOSE 102* 113*     Recent Labs     04/15/25  1845 04/16/25  0250 04/16/25 0429   RBC 3.31* 3.48* 3.29*   HEMOGLOBIN 10.2* 10.4* 10.2*   HEMATOCRIT 30* 33.0* 31.1*   PLATELETCT 176 711  161*     Recent Labs     04/13/25 2008 04/14/25  0615 04/15/25  0550 04/15/25  1845 04/16/25  0250 04/16/25  0429   WBC 10.8* 9.8 11.5* 13.7* 13.5* 13.0*   NEUTSPOLYS 79* 75 86*  --   --   --    LYMPHOCYTES 11* 12* 5*  --   --   --    MONOCYTES 8 11 8  --   --   --    EOSINOPHILS 0 2 0  --   --   --    BASOPHILS 0 0 0  --   --   --    ASTSGOT  --   --   --   --  20 19   ALTSGPT  --   --   --   --  6 6   ALKPHOSPHAT  --   --   --   --  39 32   TBILIRUBIN  --   --   --   --  0.6 0.7       Meds:   NORepinephrine  0-1 mcg/kg/min (Ideal) Stopped (04/16/25 0245)    MD Alert...Adult ICU Electrolyte Replacement per Pharmacy        apixaban  5 mg      aspirin  81 mg      carbidopa-levodopa  2 Tablet      pravastatin  40 mg      rasagiline  0.5 mg          Procedures:  None    Imaging:  DX-CHEST-LIMITED (1 VIEW)   Final Result         1.  Bilateral basilar atelectasis, no focal infiltrate   2.  Cardiomegaly          ASSESSEMENT and PLAN:    * Severe aortic stenosis  Assessment & Plan  At [facility], echo presented critical aortic senosis with aortic valve area of 0.55 cm^2 and a mean gradient of 45 mm hg. He was transferred to [facility] for emergent aortic valve repair  - Cardiology consulted, appreciate recommendation  Maintain euvolemia  Levophed for MAP > 65  Optimize filling pressures  Echo pending    Atrial fibrillation with RVR(HCC)  Assessment & Plan  Optimize electrolytes  Continue home Eliquis  Precedex  Echo pending    Delirium  Assessment & Plan  Likely, prone due to underlying Parkinson's disease and in the setting of hospitalization  - Precedex  - U/A and Urine drug screen.     Parkinson's disease with dyskinesia without fluctuating manifestations (HCC)- (present on admission)  Assessment & Plan  Continue his Rotigotine, Sinemet and rasagiline    HLD (hyperlipidemia)  Assessment & Plan  Continue statin    Hypotension  Assessment & Plan  Suspect secondary to severe AS s/p operation now in AF  - Same plan as Severe  aortic stenosis.    Hypophosphatemia  Assessment & Plan  - replete electrolytes as needed    Shoulder fracture  Assessment & Plan  OR at [facility] 4/14    Keep dressing in place until seen outpatient in ortho clinic, unless it is saturated (currently it is not saturated)    Ok to shower, keep out of direct spray  Keep fingers moving: makes a strong fist, hold and then release.  Repeat this process throughout the day    NO lifting, pushing / pulling or carrying with the left arm until cleared in ortho clinic        DISPO: Pending    CODE STATUS: Full     Quality Measures:  Feeding: Regular  Analgesia: Tylenol and oxy  Sedation: Precedex  Thromboprophylaxis: Eliquis  Head of bed: >30 degrees  Ulcer prophylaxis: None  Glycemic control: None  Bowel care: bowel regimen : Senna-docusate and miralax  Indwelling lines: pIVs  Deescalation of antibiotics: None      Prince ELIDA Kline D.O.   PGY-2 Internal medicine

## 2025-04-16 NOTE — Clinical Note
REFERRAL APPROVAL NOTICE         Sent on April 16, 2025                   Osman Choi  [address]   [address]  [facility] [address]                   Dear Leslye Choi,    After a careful review of the medical information and benefit coverage, [facility] has processed your referral. See below for additional details.    If applicable, you must be actively enrolled with your insurance for coverage of the authorized service. If you have any questions regarding your coverage, please contact your insurance directly.    REFERRAL INFORMATION   Referral #:  [id number]  Referred-To Provider    Referred-By Provider:  Speech Therapy    Adonay Olson D.O.   [address]  [facility] [address]  [phone] [address] [place] [address]., [address]  [place] [address]  [phone]    Referral Start Date:  04/11/2025  Referral End Date:   04/11/2026             SCHEDULING  If you do not already have an appointment, please call [phone] to make an appointment.     MORE INFORMATION  If you do not already have a [employer] account, sign up at: [email or web address]  You can access your medical information, make appointments, see lab results, billing information, and more.  If you have questions regarding this referral, please contact  the [facility] Referrals department at:             [phone]. Monday - Friday 8:00AM - 5:00PM.     Sincerely,    [facility]

## 2025-04-16 NOTE — H&P
Critical Care History & Physical    Date of consult: 04/16/25    Referring Physician  Ilya Feliciano M.D.    Reason for Consultation  Severe aortic stenosis  Hypotension    History of Presenting Illness  80 y.o. male with a history of Parkinson's, moderate to severe aortic stenosis, atrial fibrillation on Eliquis, recent shoulder fracture on 4/13 with repair on 4/14.  He went into A-fib yesterday and began dropping systolic pressures to the 80s despite IV fluids.  Echo at [facility] showed reported severe aortic stenosis and they were uncomfortable should he decompensate so requested transfer.  He was started on norepinephrine and transferred to [facility].    He denies any chest pain, shortness of breath.  He believes his fall was mechanical and there was no LOC involved.      Code Status  Full Code    Review of Systems  Review of Systems   Constitutional:  Negative for chills, fever and malaise/fatigue.   HENT:  Negative for congestion and sore throat.    Eyes: Negative.    Respiratory:  Negative for sputum production and shortness of breath.    Cardiovascular:  Negative for chest pain and palpitations.   Gastrointestinal:  Negative for abdominal pain, nausea and vomiting.   Genitourinary: Negative.    Musculoskeletal: Negative.    Skin: Negative.    Neurological:  Negative for focal weakness and headaches.   All other systems reviewed and are negative.      Past Medical History   has a past medical history of Parkinson disease (HCC).    Surgical History   has a past surgical history that includes orif, shoulder.    Family History  Reviewed and not pertinent    Social History   reports that he has never smoked. He has never used smokeless tobacco. He reports current alcohol use of about 8.4 oz of alcohol per week. He reports that he does not use drugs.    Medications  Home Medications    **Home medications have not yet been reviewed for this encounter**         Allergies  No Known Allergies      Vital Signs last 24  hours  Temp:  [36.8 °C (98.2 °F)] 36.8 °C (98.2 °F)  Pulse:  [112-114] 114  Resp:  [24-32] 32  BP: (108-123)/(74-85) 108/74  SpO2:  [95 %-98 %] 95 %      Physical Exam   Physical Exam  Vitals and nursing note reviewed. Exam conducted with a chaperone present.   Constitutional:       General: He is not in acute distress.     Appearance: Normal appearance. He is not ill-appearing.   HENT:      Head: Normocephalic.      Mouth/Throat:      Mouth: Mucous membranes are moist.   Eyes:      Extraocular Movements: Extraocular movements intact.   Cardiovascular:      Rate and Rhythm: Tachycardia present. Rhythm irregular.      Pulses: Normal pulses.      Heart sounds: Murmur heard.   Pulmonary:      Effort: Pulmonary effort is normal. No respiratory distress.   Abdominal:      General: There is no distension.      Palpations: Abdomen is soft.      Tenderness: There is no abdominal tenderness. There is no guarding or rebound.   Musculoskeletal:         General: Normal range of motion.      Cervical back: Normal range of motion and neck supple.      Comments: Silver dressing in place over left shoulder   Skin:     General: Skin is warm and dry.      Capillary Refill: Capillary refill takes less than 2 seconds.   Neurological:      General: No focal deficit present.      Mental Status: He is alert and oriented to person, place, and time. Mental status is at baseline.           Fluids    Intake/Output Summary (Last 24 hours) at 4/16/2025 0336  Last data filed at 4/16/2025 0300  Gross per 24 hour   Intake --   Output 260 ml   Net -260 ml         Laboratory  Recent Results (from the past 48 hours)   CBC WITH DIFFERENTIAL    Collection Time: 04/14/25  6:15 AM   Result Value Ref Range    WBC 9.8 4.0 - 10.0 K/uL    RBC 3.85 (L) 4.70 - 6.10 M/uL    Hemoglobin 12 (L) 14.0 - 18.0 g/dL    Hematocrit 34.6 (L) 42.0 - 54.0 %    MCV 89.9 81.0 - 100.0 fL    MCH 31.2 26.0 - 34.0 pg    MCHC 34.7 33.0 - 37.0 g/dL    RDW 13.9 11.5 - 14.5 %     Platelet Count 185 130 - 400 K/uL    MPV 9.3 8.5 - 12.5 fL    Neutrophils-Polys 75 42 - 75 %    Lymphocytes 12 (L) 21 - 51 %    Monocytes 11 2 - 12 %    Eosinophils 2 0 - 7 %    Basophils 0 0 - 2 %    Immature Granulocytes 0 0 - 1 %    Neutrophils (Absolute) 7.34 1.50 - 8.00 K/uL    Lymphs (Absolute) 1.16 0.70 - 4.50 K/uL    Monos (Absolute) 1.02 (H) 0.10 - 1.00 K/uL    Eos (Absolute) 0.17 0.00 - 0.40 K/uL    Baso (Absolute) 0.03 0.00 - 0.20 K/uL    Immature Granulocytes (abs) 0.04 0.00 - 2.90 K/uL   CBC WITH DIFFERENTIAL    Collection Time: 04/15/25  5:50 AM   Result Value Ref Range    WBC 11.5 (H) 4.0 - 10.0 K/uL    RBC 3.7 (L) 4.70 - 6.10 M/uL    Hemoglobin 11.3 (L) 14.0 - 18.0 g/dL    Hematocrit 33.3 (L) 42.0 - 54.0 %    MCV 90 81.0 - 100.0 fL    MCH 30.5 26.0 - 34.0 pg    MCHC 33.9 33.0 - 37.0 g/dL    RDW 13.8 11.5 - 14.5 %    Platelet Count 181 130 - 400 K/uL    MPV 9.8 8.5 - 12.5 fL    Neutrophils-Polys 86 (H) 42 - 75 %    Lymphocytes 5 (L) 21 - 51 %    Monocytes 8 2 - 12 %    Eosinophils 0 0 - 7 %    Basophils 0 0 - 2 %    Immature Granulocytes 1 0 - 1 %    Neutrophils (Absolute) 9.9 (H) 1.50 - 8.00 K/uL    Lymphs (Absolute) 0.62 (L) 0.70 - 4.50 K/uL    Monos (Absolute) 0.88 0.10 - 1.00 K/uL    Eos (Absolute) 0 0.00 - 0.40 K/uL    Baso (Absolute) 0 0.00 - 0.20 K/uL    Immature Granulocytes (abs) 0.06 0.00 - 2.90 K/uL   CBC WITHOUT DIFFERENTIAL    Collection Time: 04/15/25  6:45 PM   Result Value Ref Range    WBC 13.7 (H) 4.0 - 10.0 K/uL    RBC 3.31 (L) 4.70 - 6.10 M/uL    Hemoglobin 10.2 (L) 14.0 - 18.0 g/dL    Hematocrit 30 (L) 42.0 - 54.0 %    MCV 90.6 81.0 - 100.0 fL    MCH 30.8 26.0 - 34.0 pg    MCHC 34 33.0 - 37.0 g/dL    Platelet Count 176 130 - 400 K/uL    MPV 9.7 8.5 - 12.5 fL    RDW 13.9 11.5 - 14.5 %         Imaging  DX-CHEST-LIMITED (1 VIEW)    (Results Pending)         Assessment/Plan  * Severe aortic stenosis  Assessment & Plan  Formal ECHO ordered  Cardiology consult tomorrow after  ECHO    Maintain euvolemia  Levophed for MAP > 65  Optimize filling pressures    Hypotension  Assessment & Plan  Suspect secondary to severe AS s/p operation now in AF    Rhythm control  Formal ECHO  Maintain euvolemia  Levophed for MAP > 65    Shoulder fracture  Assessment & Plan  OR at [facility] 4/14    Keep dressing in place until seen outpatient in ortho clinic, unless it is saturated (currently it is not saturated)    Ok to shower, keep out of direct spray  Keep fingers moving: makes a strong fist, hold and then release.  Repeat this process throughout the day    NO lifting, pushing / pulling or carrying with the left arm until cleared in ortho clinic    Atrial fibrillation (HCC)  Assessment & Plan  Optimize electrolytes  Continue Eliquis  Amiodarone infusion without IVP    Parkinson's disease with dyskinesia without fluctuating manifestations (HCC)- (present on admission)  Assessment & Plan  Continue his Sinemet and rasagiline  Pharmacy consult in the morning for Neupro    HLD (hyperlipidemia)  Assessment & Plan  Continue statin        DVT prophylaxis: Continue home apixaban  PUD prophylaxis: N/A  Glycemic control: SSI if needed  Nutrition: Advance as tolerated to regular diet  Lines: None  Smith: None    Discussed patient condition and risk of morbidity and/or mortality with RN, RT, Pharmacy, Code status disscussed, Charge nurse / hot rounds, and Patient.      The patient remains critically ill.  I am actively titrating vasopressors for MAP >65 Critical care time = 69 minutes in directly providing and coordinating critical care and extensive data review.  No time overlap and excludes procedures.

## 2025-04-16 NOTE — CARE PLAN
The patient is Watcher - Medium risk of patient condition declining or worsening    Shift Goals  Clinical Goals: hemodynamic stability  Patient Goals: comfort, updates  Family Goals: JOSE    Progress made toward(s) clinical / shift goals:    Problem: Knowledge Deficit - Standard  Goal: Patient and family/care givers will demonstrate understanding of plan of care, disease process/condition, diagnostic tests and medications  Outcome: Progressing     Problem: Pain - Standard  Goal: Alleviation of pain or a reduction in pain to the patient’s comfort goal  Outcome: Progressing     Problem: Fall Risk  Goal: Patient will remain free from falls  Outcome: Progressing     Problem: Skin Integrity  Goal: Skin integrity is maintained or improved  Outcome: Progressing       Patient is not progressing towards the following goals:

## 2025-04-17 LAB
ALBUMIN SERPL BCP-MCNC: 3.2 G/DL (ref 3.2–4.9)
ALBUMIN/GLOB SERPL: 1.3 G/DL
ALP SERPL-CCNC: 34 U/L (ref 30–99)
ALT SERPL-CCNC: 6 U/L (ref 2–50)
ANION GAP SERPL CALC-SCNC: 9 MMOL/L (ref 7–16)
AST SERPL-CCNC: 18 U/L (ref 12–45)
BILIRUB SERPL-MCNC: 1 MG/DL (ref 0.1–1.5)
BUN SERPL-MCNC: 10 MG/DL (ref 8–22)
CALCIUM ALBUM COR SERPL-MCNC: 8.6 MG/DL (ref 8.5–10.5)
CALCIUM SERPL-MCNC: 8 MG/DL (ref 8.5–10.5)
CHLORIDE SERPL-SCNC: 106 MMOL/L (ref 96–112)
CO2 SERPL-SCNC: 22 MMOL/L (ref 20–33)
CREAT SERPL-MCNC: 0.53 MG/DL (ref 0.5–1.4)
ERYTHROCYTE [DISTWIDTH] IN BLOOD BY AUTOMATED COUNT: 47 FL (ref 35.9–50)
GFR SERPLBLD CREATININE-BSD FMLA CKD-EPI: 102 ML/MIN/1.73 M 2
GLOBULIN SER CALC-MCNC: 2.5 G/DL (ref 1.9–3.5)
GLUCOSE SERPL-MCNC: 115 MG/DL (ref 65–99)
HCT VFR BLD AUTO: 32.4 % (ref 42–52)
HGB BLD-MCNC: 10.4 G/DL (ref 14–18)
MAGNESIUM SERPL-MCNC: 2.8 MG/DL (ref 1.5–2.5)
MCH RBC QN AUTO: 29.5 PG (ref 27–33)
MCHC RBC AUTO-ENTMCNC: 32.1 G/DL (ref 32.3–36.5)
MCV RBC AUTO: 92 FL (ref 81.4–97.8)
PHOSPHATE SERPL-MCNC: 2.7 MG/DL (ref 2.5–4.5)
PLATELET # BLD AUTO: 171 K/UL (ref 164–446)
PMV BLD AUTO: 9.5 FL (ref 9–12.9)
POTASSIUM SERPL-SCNC: 4.6 MMOL/L (ref 3.6–5.5)
PROT SERPL-MCNC: 5.7 G/DL (ref 6–8.2)
RBC # BLD AUTO: 3.52 M/UL (ref 4.7–6.1)
SODIUM SERPL-SCNC: 137 MMOL/L (ref 135–145)
WBC # BLD AUTO: 10.3 K/UL (ref 4.8–10.8)

## 2025-04-17 PROCEDURE — A9270 NON-COVERED ITEM OR SERVICE: HCPCS | Performed by: STUDENT IN AN ORGANIZED HEALTH CARE EDUCATION/TRAINING PROGRAM

## 2025-04-17 PROCEDURE — 700102 HCHG RX REV CODE 250 W/ 637 OVERRIDE(OP)

## 2025-04-17 PROCEDURE — 700105 HCHG RX REV CODE 258: Performed by: INTERNAL MEDICINE

## 2025-04-17 PROCEDURE — 85027 COMPLETE CBC AUTOMATED: CPT

## 2025-04-17 PROCEDURE — A9270 NON-COVERED ITEM OR SERVICE: HCPCS | Performed by: INTERNAL MEDICINE

## 2025-04-17 PROCEDURE — 770022 HCHG ROOM/CARE - ICU (200)

## 2025-04-17 PROCEDURE — 83735 ASSAY OF MAGNESIUM: CPT

## 2025-04-17 PROCEDURE — 700102 HCHG RX REV CODE 250 W/ 637 OVERRIDE(OP): Performed by: STUDENT IN AN ORGANIZED HEALTH CARE EDUCATION/TRAINING PROGRAM

## 2025-04-17 PROCEDURE — 700102 HCHG RX REV CODE 250 W/ 637 OVERRIDE(OP): Performed by: NURSE PRACTITIONER

## 2025-04-17 PROCEDURE — A9270 NON-COVERED ITEM OR SERVICE: HCPCS

## 2025-04-17 PROCEDURE — 99291 CRITICAL CARE FIRST HOUR: CPT | Mod: GC | Performed by: INTERNAL MEDICINE

## 2025-04-17 PROCEDURE — 99232 SBSQ HOSP IP/OBS MODERATE 35: CPT | Mod: FS | Performed by: INTERNAL MEDICINE

## 2025-04-17 PROCEDURE — A9270 NON-COVERED ITEM OR SERVICE: HCPCS | Performed by: NURSE PRACTITIONER

## 2025-04-17 PROCEDURE — 80053 COMPREHEN METABOLIC PANEL: CPT

## 2025-04-17 PROCEDURE — 700102 HCHG RX REV CODE 250 W/ 637 OVERRIDE(OP): Performed by: INTERNAL MEDICINE

## 2025-04-17 PROCEDURE — 84100 ASSAY OF PHOSPHORUS: CPT

## 2025-04-17 PROCEDURE — 700111 HCHG RX REV CODE 636 W/ 250 OVERRIDE (IP): Performed by: INTERNAL MEDICINE

## 2025-04-17 PROCEDURE — 700101 HCHG RX REV CODE 250: Performed by: INTERNAL MEDICINE

## 2025-04-17 RX ORDER — SODIUM CHLORIDE 9 MG/ML
500 INJECTION, SOLUTION INTRAVENOUS ONCE
Status: COMPLETED | OUTPATIENT
Start: 2025-04-17 | End: 2025-04-17

## 2025-04-17 RX ORDER — ENOXAPARIN SODIUM 100 MG/ML
1 INJECTION SUBCUTANEOUS EVERY 12 HOURS
Status: DISCONTINUED | OUTPATIENT
Start: 2025-04-17 | End: 2025-04-17

## 2025-04-17 RX ORDER — METOPROLOL SUCCINATE 25 MG/1
50 TABLET, EXTENDED RELEASE ORAL
Status: DISCONTINUED | OUTPATIENT
Start: 2025-04-18 | End: 2025-04-18

## 2025-04-17 RX ORDER — METOPROLOL SUCCINATE 25 MG/1
25 TABLET, EXTENDED RELEASE ORAL
Status: DISCONTINUED | OUTPATIENT
Start: 2025-04-17 | End: 2025-04-17

## 2025-04-17 RX ADMIN — APIXABAN 5 MG: 5 TABLET, FILM COATED ORAL at 10:17

## 2025-04-17 RX ADMIN — PRAVASTATIN SODIUM 40 MG: 20 TABLET ORAL at 21:09

## 2025-04-17 RX ADMIN — ACETAMINOPHEN 1000 MG: 500 TABLET, FILM COATED ORAL at 10:07

## 2025-04-17 RX ADMIN — CARBIDOPA AND LEVODOPA 2 TABLET: 25; 100 TABLET ORAL at 10:07

## 2025-04-17 RX ADMIN — METOPROLOL SUCCINATE 12.5 MG: 25 TABLET, EXTENDED RELEASE ORAL at 14:29

## 2025-04-17 RX ADMIN — DEXMEDETOMIDINE 1.5 MCG/KG/HR: 100 INJECTION, SOLUTION INTRAVENOUS at 04:18

## 2025-04-17 RX ADMIN — DEXMEDETOMIDINE 1.5 MCG/KG/HR: 100 INJECTION, SOLUTION INTRAVENOUS at 00:42

## 2025-04-17 RX ADMIN — THIAMINE HYDROCHLORIDE 200 MG: 100 INJECTION, SOLUTION INTRAMUSCULAR; INTRAVENOUS at 05:30

## 2025-04-17 RX ADMIN — THIAMINE HYDROCHLORIDE 200 MG: 100 INJECTION, SOLUTION INTRAMUSCULAR; INTRAVENOUS at 17:39

## 2025-04-17 RX ADMIN — CARBIDOPA AND LEVODOPA 2 TABLET: 25; 100 TABLET ORAL at 17:36

## 2025-04-17 RX ADMIN — SODIUM CHLORIDE 500 ML: 9 INJECTION, SOLUTION INTRAVENOUS at 13:49

## 2025-04-17 RX ADMIN — ACETAMINOPHEN 1000 MG: 500 TABLET, FILM COATED ORAL at 17:37

## 2025-04-17 RX ADMIN — APIXABAN 5 MG: 5 TABLET, FILM COATED ORAL at 17:46

## 2025-04-17 RX ADMIN — ROTIGOTINE 8 MG: 8 PATCH, EXTENDED RELEASE TRANSDERMAL at 05:35

## 2025-04-17 ASSESSMENT — ENCOUNTER SYMPTOMS
ABDOMINAL PAIN: 0
SHORTNESS OF BREATH: 0
DIZZINESS: 0
CHEST TIGHTNESS: 0
HEADACHES: 0
PALPITATIONS: 0
CHILLS: 0
MYALGIAS: 1
NAUSEA: 0
FEVER: 0
VOMITING: 0

## 2025-04-17 ASSESSMENT — PAIN DESCRIPTION - PAIN TYPE
TYPE: ACUTE PAIN
TYPE: ACUTE PAIN;SURGICAL PAIN
TYPE: ACUTE PAIN

## 2025-04-17 NOTE — PROGRESS NOTES
Escalated concerns to Dr Kline and Dr Woods regarding left arm swelling and decreased cap refill. MD at bedside assessing.

## 2025-04-17 NOTE — CARE PLAN
Shift Goals  Clinical Goals: RASS, Hemodynamics  Patient Goals: Nutrition  Family Goals: Nutrition, Plan of care    Progress made toward(s) clinical / shift goals:    Problem: Knowledge Deficit - Standard  Goal: Patient and family/care givers will demonstrate understanding of plan of care, disease process/condition, diagnostic tests and medications  Outcome: Progressing  Note: Understands poc     Problem: Pain - Standard  Goal: Alleviation of pain or a reduction in pain to the patient’s comfort goal  Outcome: Progressing  Note: Pain being assessed Q2     Problem: Psychosocial  Goal: Patient's level of anxiety will decrease  Outcome: Progressing     Problem: Hemodynamics  Goal: Patient's hemodynamics, fluid balance and neurologic status will be stable or improve  Outcome: Progressing     Problem: Nutrition  Goal: Patient's nutritional and fluid intake will be adequate or improve  Outcome: Progressing  Note: Eating as tolerated     Problem: Safety - Medical Restraint  Goal: Remains free of injury from restraints (Restraint for Interference with Medical Device)  Outcome: Progressing  Goal: Free from restraint(s) (Restraint for Interference with Medical Device)  Outcome: Progressing       Patient is not progressing towards the following goals:

## 2025-04-17 NOTE — PROGRESS NOTES
Cardiology Follow Up Progress Note    Date of Service  4/17/2025    Attending Physician  Kike Woods M.D.    Chief Complaint   Atrial fibrillation, aortic stenosis, hypotension     HPI  Osman Choi is a 80 y.o. male admitted 4/16/2025 as a transfer from [facility] for aortic stenosis, atrial fibrillation, and hypotension.     He has a history of Parkinsonism, moderate to severe aortic stenosis, atrial fibrillation, on Eliquis, and recent shoulder fracture 4/13 with repair on 4/14.     4/16: He converted to atrial fibrillation and hypotension despite fluid replacement post procedure. An ECHO showed severe aortic stenosis. He was transferred to our facility for higher level of care.     Our service was consulted for management of suspected severe aortic stenosis.     Interim Events  4/16: Atrial fibrillation with rapid ventricular rate, 78-150bpm. On Norepinephrine. Confused, agitated. TTE showing EF 75% with normal RV and LV function    4/17: No acute events overnight. Mentation improving, on dex. Atrial fibrillation rate of 103-143bpm. Off norepinephrine. .     Review of Systems  Review of Systems   Respiratory:  Negative for shortness of breath.    Cardiovascular:  Negative for chest pain, palpitations and leg swelling.       Vital signs in last 24 hours  Temp:  [36.1 °C (97 °F)-37.2 °C (99 °F)] 37 °C (98.6 °F)  Pulse:  [] (P) 143  Resp:  [12-39] 21  BP: ()/(58-99) (P) 116/82  SpO2:  [85 %-100 %] 92 %    Physical Exam  Physical Exam  Constitutional:       General: He is not in acute distress.     Appearance: He is ill-appearing.   HENT:      Head: Normocephalic and atraumatic.      Mouth/Throat:      Mouth: Mucous membranes are moist.   Eyes:      Extraocular Movements: Extraocular movements intact.      Pupils: Pupils are equal, round, and reactive to light.   Cardiovascular:      Rate and Rhythm: Tachycardia present. Rhythm irregular.      Pulses: Normal pulses.      Heart  "sounds: Murmur heard.      Systolic murmur is present with a grade of 3/6.   Pulmonary:      Effort: No respiratory distress.      Breath sounds: Decreased air movement present.   Abdominal:      Palpations: Abdomen is soft.   Musculoskeletal:      Right lower leg: No edema.      Left lower leg: No edema.   Skin:     General: Skin is warm and dry.      Capillary Refill: Capillary refill takes 2 to 3 seconds.   Neurological:      General: No focal deficit present.      Mental Status: He is alert. He is disoriented.         Lab Review  Lab Results   Component Value Date/Time    WBC 10.3 04/17/2025 03:20 AM    RBC 3.52 (L) 04/17/2025 03:20 AM    HEMOGLOBIN 10.4 (L) 04/17/2025 03:20 AM    HEMATOCRIT 32.4 (L) 04/17/2025 03:20 AM    MCV 92.0 04/17/2025 03:20 AM    MCH 29.5 04/17/2025 03:20 AM    MCHC 32.1 (L) 04/17/2025 03:20 AM    MPV 9.5 04/17/2025 03:20 AM      Lab Results   Component Value Date/Time    SODIUM 137 04/17/2025 03:20 AM    POTASSIUM 4.6 04/17/2025 03:20 AM    CHLORIDE 106 04/17/2025 03:20 AM    CO2 22 04/17/2025 03:20 AM    GLUCOSE 115 (H) 04/17/2025 03:20 AM    BUN 10 04/17/2025 03:20 AM    CREATININE 0.53 04/17/2025 03:20 AM      Lab Results   Component Value Date/Time    ASTSGOT 18 04/17/2025 03:20 AM    ALTSGPT 6 04/17/2025 03:20 AM     No results found for: \"CHOLSTRLTOT\", \"LDL\", \"HDL\", \"TRIGLYCERIDE\", \"TROPONINT\"    No results for input(s): \"NTPROBNP\" in the last 72 hours.    Cardiac Imaging and Procedures Review  EKG:  My personal interpretation of the EKG dated 4/16 is atrial fibrillation, rate of 117 bpm     Echocardiogram:  4/16  EF 75% with normal RV and LV function, difficult to assess aortic valve stenosis.     Cardiac cath: n/a     Imaging  Chest X-Ray:  4/16 cardiomegaly, no acute process, right basilar density      Stress Test:  n/a     Assessment/Plan  Assessment & plan notes cannot be loaded without a specified hospital service.    80 y.o. male admitted 4/16/2025 as a transfer from [facility] [facility] for aortic stenosis, atrial fibrillation, and hypotension.     Aortic Stenosis   -h/o, difficult to assess degree on TTE r/t arrhythmia   -recommend [facility] and further workup outpatient   -keep euvolemic     Atrial fibrillation with rapid ventricular response   -h/o, on eliquis  -EKG 4/16 trial fibrillation, rate of 117 bpm   -HR remains 105-133, patient denies chest pain, SOB, palpitations   -off vasoactive medications   -no indication for rate control at this time, long-standing history, asymptomatic, hemodynamically stable   -okay to resume eliquis

## 2025-04-17 NOTE — PROGRESS NOTES
"Cardiology Follow Up Progress Note    Date of Service  4/17/2025    Attending Physician  Kike Woods M.D.    Chief Complaint   Atrial fibrillation, Aortic stenosis, hypotension     HPI  Osman Choi is a 80 y.o. male admitted 4/16/2025 with per Dr. Guevara, \"history of Parkinson's, aortic stenosis, atrial fibrillation on Eliquis, recent fall with shoulder fracture status post repair 2 days ago, presented to [facility] for further care from outside facility. Patient was concerned due to uncontrolled atrial fibrillation in terms of rate. He also had some hypotension episodes and was started on Levophed.\"    Interim Events  4/17/2025: No overnight cardiac events. Tele monitoring personally interpreted by me shows afib . VSS; 2-4L NC. Labs reviewed, notable for H+H 10.4+32.4.   Disposition: shared decision making with family and Dr. Guevara. [facility] outpatient       Review of Systems  Review of Systems   Respiratory:  Negative for chest tightness and shortness of breath.    Cardiovascular:  Negative for chest pain, palpitations and leg swelling.       Vital signs in last 24 hours  Temp:  [36.1 °C (97 °F)-37.2 °C (99 °F)] 36.3 °C (97.3 °F)  Pulse:  [] 95  Resp:  [12-39] 15  BP: ()/(69-99) 121/82  SpO2:  [85 %-100 %] 94 %    Physical Exam  Physical Exam  Vitals and nursing note reviewed.   Constitutional:       General: He is not in acute distress.  Cardiovascular:      Rate and Rhythm: Tachycardia present. Rhythm irregular.      Pulses: Normal pulses.      Heart sounds: Murmur heard.      Systolic murmur is present with a grade of 3/6.   Pulmonary:      Effort: Pulmonary effort is normal. No respiratory distress.   Abdominal:      Palpations: Abdomen is soft.   Musculoskeletal:         General: No swelling.      Cervical back: Normal range of motion.      Right lower leg: No edema.      Left lower leg: No edema.   Skin:     General: Skin is warm and dry.   Neurological:      General: No focal deficit " "present.      Mental Status: He is alert and oriented to person, place, and time. Mental status is at baseline.   Psychiatric:         Mood and Affect: Mood normal.         Behavior: Behavior normal.         Lab Review  Lab Results   Component Value Date/Time    WBC 10.3 04/17/2025 03:20 AM    RBC 3.52 (L) 04/17/2025 03:20 AM    HEMOGLOBIN 10.4 (L) 04/17/2025 03:20 AM    HEMATOCRIT 32.4 (L) 04/17/2025 03:20 AM    MCV 92.0 04/17/2025 03:20 AM    MCH 29.5 04/17/2025 03:20 AM    MCHC 32.1 (L) 04/17/2025 03:20 AM    MPV 9.5 04/17/2025 03:20 AM      Lab Results   Component Value Date/Time    SODIUM 137 04/17/2025 03:20 AM    POTASSIUM 4.6 04/17/2025 03:20 AM    CHLORIDE 106 04/17/2025 03:20 AM    CO2 22 04/17/2025 03:20 AM    GLUCOSE 115 (H) 04/17/2025 03:20 AM    BUN 10 04/17/2025 03:20 AM    CREATININE 0.53 04/17/2025 03:20 AM      Lab Results   Component Value Date/Time    ASTSGOT 18 04/17/2025 03:20 AM    ALTSGPT 6 04/17/2025 03:20 AM     No results found for: \"CHOLSTRLTOT\", \"LDL\", \"HDL\", \"TRIGLYCERIDE\", \"TROPONINT\"    No results for input(s): \"NTPROBNP\" in the last 72 hours.    Cardiac Imaging and Procedures Review    Echocardiogram (4/16/2025):    Hyperdynamic left ventricular systolic function.  The left ventricular ejection fraction is visually estimated to be 75%.  Diastolic function is difficult to assess with arrhythmia.  Normal right ventricular size and systolic function.  Unable to accurately asses aortic valve stenosis.      Assessment/Plan  #Afib with RVR  #Aortic stenosis  #HLD  #Parkinson's Disease  #Delerium  #Shoulder fracture    -Continue rate control. Add BB  -OAC  -EF preserved  -continue  -[facility] outpatient with structural heart clinic referral  -Rehab per primary team    Thank you for allowing me to participate in the care of this patient.      Please contact me with any questions.    Please see Dr. Guevara's attestation for further details and MDM.     I personally spent a total of 15 minutes which " includes face-to-face time and non-face-to-face time spent on preparing to see the patient, reviewing hospital notes and tests, obtaining history from the patient, performing a medically appropriate exam, counseling and educating the patient, ordering medications/tests/procedures/referrals as clinically indicated, and documenting information in the electronic medical record.    TREMAYNE Heaton, CCK, HF-CERT   [facility]  [phone]

## 2025-04-17 NOTE — PROGRESS NOTES
4 Eyes Skin Assessment Completed by AVA Bee and AVA Gamino.    Head Blanching and Redness Rash on forehead eyes swollen  Ears Redness and Blanching  Nose Redness and Blanching  Mouth WDL  Neck WDL  Breast/Chest Redness, Blanching, Rash, and Discoloration  Shoulder Blades Redness Blanching  Spine Redness and Blanching abrasion  (R) Arm/Elbow/Hand Redness, Blanching, Bruising, and Swelling Medication access point for nerve block  (L) Arm/Elbow/Hand Redness, Blanching, Bruising, Swelling, and Discoloration silver dressing  and swollen fingers  Abdomen Redness and Blanching  Groin Redness, Blanching, and Excoriation  Scrotum/Coccyx/Buttocks Redness, Blanching, and Excoriation  (R) Leg Swelling  (L) Leg Swelling  (R) Heel/Foot/Toe WDL  (L) Heel/Foot/Toe WDL          Devices In Places ECG, Pulse Ox, and Traction      Interventions In Place Gray Ear Foams, TAP System, Pillows, Elbow Mepilex, Q2 Turns, Low Air Loss Mattress, Barrier Cream, Heels Loaded W/Pillows, and Pressure Redistribution Mattress    Possible Skin Injury No    Pictures Uploaded Into Epic Yes  Wound Consult Placed N/A  RN Wound Prevention Protocol Ordered Yes

## 2025-04-17 NOTE — PROGRESS NOTES
Patient refusing all evening meds despite education and encouragement by family. Charge RN and Dr. Kline updated.

## 2025-04-17 NOTE — CARE PLAN
The patient is Watcher - Medium risk of patient condition declining or worsening    Shift Goals  Clinical Goals: hemodynamic stability, RASS 0, safety  Patient Goals: go home  Family Goals: updates    Progress made toward(s) clinical / shift goals:    Problem: Pain - Standard  Goal: Alleviation of pain or a reduction in pain to the patient’s comfort goal  Outcome: Progressing     Problem: Fall Risk  Goal: Patient will remain free from falls  Outcome: Progressing     Problem: Skin Integrity  Goal: Skin integrity is maintained or improved  Outcome: Progressing     Problem: Hemodynamics  Goal: Patient's hemodynamics, fluid balance and neurologic status will be stable or improve  Outcome: Progressing     Problem: Safety - Medical Restraint  Goal: Remains free of injury from restraints (Restraint for Interference with Medical Device)  Outcome: Progressing  Goal: Free from restraint(s) (Restraint for Interference with Medical Device)  Outcome: Progressing       Patient is not progressing towards the following goals:

## 2025-04-17 NOTE — PROGRESS NOTES
[facility] Progress Note      Admit Date: 4/16/2025    Resident(s): Prince ELIDA Kline D.O.   Attending:  VERONICA MCKINNON/ Dr. Woods    Patient ID:    Name:  Osmna Choi   YOB: 1945  Age:  80 y.o.  male   MRN:  [id number]    Hospital Course (carried forward and updated):  Osman Choi is a 80 y.o. male with moderate to severe aortic stenosis, atrial fibrillation on Eliquis, Parkinson's, recent ground level fall complicated by left closed fracture of left proximal humerus on 4/13 with repair on 4/14 who was transferred from [facility] to [facility] for severe aortic stenosis for emergent aortic valve repair.     He underwent a repair for left closed fracture on 4/14. His postoperative course was complicated by progressive hypotension unresponsive to fluids. Also, entered AF on 4/15. Echo presented critical aortic senosis with aortic valve area of 0.55 cm^2 and a mean gradient of 45 mm hg. He was transferred to [facility] for emergent aortic valve repair. Echo 4/16/25 presented hyperdynamic LVEF of 75% and unable to assess aortic stenosis.    Consultants:  Critical Care  Cardiology    Interval Events:  No acute overnight events  Echo 4/16/25 presented hyperdynamic LVEF of 75% and unable to assess aortic stenosis.  Reached out to orthopedic surgeon at [facility] who performed repair and receive instructions for management  Off Levophed  Patient's wife requested [facility] cardiologist to contact his outpatient cardiologist.  Patient stated that he has pain on 4-5/10. Stated that he is hungry      Vitals Range last 24h:  Temp:  [36.1 °C (97 °F)-37.2 °C (99 °F)] 37 °C (98.6 °F)  Pulse:  [] 133  Resp:  [12-39] 23  BP: ()/(69-99) 115/89  SpO2:  [85 %-100 %] 95 %      Intake/Output Summary (Last 24 hours) at 4/17/2025 1033  Last data filed at 4/17/2025 0600  Gross per 24 hour   Intake 763.89 ml   Output 900 ml   Net -136.11 ml        Review of Systems   Constitutional:  Negative for chills and fever.    Cardiovascular:  Negative for chest pain and palpitations.   Gastrointestinal:  Negative for abdominal pain, nausea and vomiting.   Genitourinary:  Negative for dysuria and frequency.   Musculoskeletal:  Positive for myalgias.        Reports pain on left shoulder on surgical site 4,5/10   Neurological:  Negative for dizziness and headaches.       PHYSICAL EXAM:  Vitals:    04/17/25 0700 04/17/25 0800 04/17/25 0900 04/17/25 1000   BP: 121/82 120/80  115/89   Pulse: 95 (!) 103 (!) 133 (!) 133   Resp: 15 18 20 (!) 23   Temp:  37 °C (98.6 °F)  37 °C (98.6 °F)   TempSrc:  Temporal  Temporal   SpO2:  91% 96% 95%   Weight:       Height:        Body mass index is 26.29 kg/m².    O2 therapy: Pulse Oximetry: 95 %, O2 (LPM): 7, O2 Delivery Device: Oxymask    Date 04/17/25 0700 - 04/18/25 0659   Shift 1381-4887 0538-4585 7509-9084 24 Hour Total   INTAKE   Shift Total       OUTPUT   Urine         Number of Times Voided 2 x   2 x   Shift Total       NET            Physical Exam  Constitutional:       General: He is not in acute distress.  HENT:      Head: Normocephalic and atraumatic.      Mouth/Throat:      Mouth: Mucous membranes are moist.      Pharynx: Oropharynx is clear. No oropharyngeal exudate.   Eyes:      Extraocular Movements: Extraocular movements intact.      Pupils: Pupils are equal, round, and reactive to light.   Cardiovascular:      Rate and Rhythm: Tachycardia present. Rhythm irregular.      Heart sounds: Murmur heard.   Pulmonary:      Effort: Pulmonary effort is normal. No respiratory distress.      Breath sounds: No wheezing.      Comments: Decreased respiratory effort  Abdominal:      General: Abdomen is flat. Bowel sounds are normal.      Tenderness: There is no abdominal tenderness. There is no guarding or rebound.   Musculoskeletal:      Right lower leg: No edema.      Left lower leg: No edema.      Comments: Swollen on left UE. Radial pulse +1   Skin:     General: Skin is warm and dry.   Neurological:       General: No focal deficit present.      Mental Status: He is alert and oriented to person, place, and time.             Recent Labs     04/16/25 0250 04/16/25 0429 04/17/25  0320   SODIUM 136 137 137   POTASSIUM 4.4 4.1 4.6   CHLORIDE 105 106 106   CO2 20 20 22   BUN 15 14 10   CREATININE 0.69 0.65 0.53   MAGNESIUM 2.1 2.0 2.8*   PHOSPHORUS 2.1* 1.9* 2.7   CALCIUM 8.3* 8.0* 8.0*     Recent Labs     04/16/25  0250 04/16/25 0429 04/17/25  0320   ALTSGPT 6 6 6   ASTSGOT 20 19 18   ALKPHOSPHAT 39 32 34   TBILIRUBIN 0.6 0.7 1.0   GLUCOSE 102* 113* 115*     Recent Labs     04/16/25 0250 04/16/25 0429 04/17/25  0320   RBC 3.48* 3.29* 3.52*   HEMOGLOBIN 10.4* 10.2* 10.4*   HEMATOCRIT 33.0* 31.1* 32.4*   PLATELETCT 169 161* 171     Recent Labs     04/15/25  0550 04/15/25  1845 04/16/25 0250 04/16/25 0429 04/17/25  0320   WBC 11.5*   < > 13.5* 13.0* 10.3   NEUTSPOLYS 86*  --   --   --   --    LYMPHOCYTES 5*  --   --   --   --    MONOCYTES 8  --   --   --   --    EOSINOPHILS 0  --   --   --   --    BASOPHILS 0  --   --   --   --    ASTSGOT  --   --  20 19 18   ALTSGPT  --   --  6 6 6   ALKPHOSPHAT  --   --  39 32 34   TBILIRUBIN  --   --  0.6 0.7 1.0    < > = values in this interval not displayed.       Meds:   apixaban  5 mg      NORepinephrine  0-1 mcg/kg/min (Ideal) Stopped (04/16/25 0245)    MD Alert...Adult ICU Electrolyte Replacement per Pharmacy        aspirin  81 mg      carbidopa-levodopa  2 Tablet      pravastatin  40 mg      rasagiline  0.5 mg      dexmedetomidine (Precedex) infusion  0.1-1.5 mcg/kg/hr (Ideal) 0.3 mcg/kg/hr (04/17/25 0607)    thiamine  200 mg      senna-docusate  2 Tablet      And    polyethylene glycol/lytes  1 Packet      Pharmacy Consult Request  1 Each      acetaminophen  1,000 mg      Followed by    [START ON 4/21/2025] acetaminophen  1,000 mg      oxyCODONE immediate-release  2.5 mg      Or    oxyCODONE immediate-release  5 mg      Or    HYDROmorphone  0.25 mg      Rotigotine  8 mg       ropivacaine 0.2 % (Naropin) 745 mL in On-Q Pump infusion   4 mL/hr at 04/16/25 1430    haloperidol lactate  1 mg          Procedures:  None    Imaging:  EC-ECHOCARDIOGRAM COMPLETE W/O CONT   Final Result      EC-ECHOCARDIOGRAM COMPLETE W/ CONT         DX-CHEST-LIMITED (1 VIEW)   Final Result         1.  Bilateral basilar atelectasis, no focal infiltrate   2.  Cardiomegaly          ASSESSEMENT and PLAN:    * Aortic stenosis  Assessment & Plan  At [facility], echo presented critical aortic senosis with aortic valve area of 0.55 cm^2 and a mean gradient of 45 mm hg. He was transferred to [facility] for emergent aortic valve repair  Echo 4/16/25 presented hyperdynamic LVEF of 75% and unable to assess aortic stenosis.  - Cardiology consulted, appreciate recommendation  Maintain euvolemia      Atrial fibrillation with RVR(HCC)  Assessment & Plan  Likely secondary to trauma and resulted shoulder fracture  Optimize electrolytes  Continue home Eliquis  Precedex      Delirium  Assessment & Plan  Likely, derilium prone due to underlying Parkinson's disease in the setting of hospitalization or alcohol withdrawal   U/A was unremarkable. Urine druge screen was unremarkable.  - Precedex      Parkinson's disease with dyskinesia without fluctuating manifestations (HCC)- (present on admission)  Assessment & Plan  Continue his Rotigotine, Sinemet and rasagiline    HLD (hyperlipidemia)  Assessment & Plan  Continue statin    Hypotension  Assessment & Plan  Suspect secondary to severe AS s/p operation now in AF  - Same plan as Severe aortic stenosis.    Hypophosphatemia  Assessment & Plan  - replete electrolytes as needed    Shoulder fracture  Assessment & Plan  OR at [facility] 4/14    Keep dressing in place until seen outpatient in ortho clinic, unless it is saturated (currently it is not saturated)    Ok to shower, keep out of direct spray  Keep fingers moving: makes a strong fist, hold and then release.  Repeat this process  throughout the day    NO lifting, pushing / pulling or carrying with the left arm until cleared in ortho clinic  Reached out to orthopedic surgeon at [facility] who performed repair and receive instructions for management on 4/16/25        DISPO: Pending    CODE STATUS: Full     Quality Measures:  Feeding: Regular  Analgesia: Tylenol, ropivacaine and oxy  Sedation: Precedex  Thromboprophylaxis: Eliquis  Head of bed: >30 degrees  Ulcer prophylaxis: None  Glycemic control: None  Bowel care: bowel regimen : Senna-docusate and miralax  Indwelling lines: pIVs  Deescalation of antibiotics: None      Prince ELIDA Kline D.O.   PGY-2 Internal medicine

## 2025-04-18 PROBLEM — G93.41 ACUTE METABOLIC ENCEPHALOPATHY: Status: ACTIVE | Noted: 2025-04-18

## 2025-04-18 LAB
ALBUMIN SERPL BCP-MCNC: 2.9 G/DL (ref 3.2–4.9)
ALBUMIN/GLOB SERPL: 1.1 G/DL
ALP SERPL-CCNC: 35 U/L (ref 30–99)
ALT SERPL-CCNC: <5 U/L (ref 2–50)
ANION GAP SERPL CALC-SCNC: 12 MMOL/L (ref 7–16)
AST SERPL-CCNC: 19 U/L (ref 12–45)
BILIRUB SERPL-MCNC: 1.2 MG/DL (ref 0.1–1.5)
BUN SERPL-MCNC: 17 MG/DL (ref 8–22)
C DIFF TOX GENS STL QL NAA+PROBE: NEGATIVE
CALCIUM ALBUM COR SERPL-MCNC: 8.7 MG/DL (ref 8.5–10.5)
CALCIUM SERPL-MCNC: 7.8 MG/DL (ref 8.5–10.5)
CHLORIDE SERPL-SCNC: 105 MMOL/L (ref 96–112)
CO2 SERPL-SCNC: 19 MMOL/L (ref 20–33)
CREAT SERPL-MCNC: 0.66 MG/DL (ref 0.5–1.4)
ERYTHROCYTE [DISTWIDTH] IN BLOOD BY AUTOMATED COUNT: 48.5 FL (ref 35.9–50)
GFR SERPLBLD CREATININE-BSD FMLA CKD-EPI: 96 ML/MIN/1.73 M 2
GLOBULIN SER CALC-MCNC: 2.6 G/DL (ref 1.9–3.5)
GLUCOSE SERPL-MCNC: 94 MG/DL (ref 65–99)
HCT VFR BLD AUTO: 33 % (ref 42–52)
HGB BLD-MCNC: 10.8 G/DL (ref 14–18)
MAGNESIUM SERPL-MCNC: 2.5 MG/DL (ref 1.5–2.5)
MCH RBC QN AUTO: 30.3 PG (ref 27–33)
MCHC RBC AUTO-ENTMCNC: 32.7 G/DL (ref 32.3–36.5)
MCV RBC AUTO: 92.4 FL (ref 81.4–97.8)
PHOSPHATE SERPL-MCNC: 2.4 MG/DL (ref 2.5–4.5)
PLATELET # BLD AUTO: 208 K/UL (ref 164–446)
PMV BLD AUTO: 9.7 FL (ref 9–12.9)
POTASSIUM SERPL-SCNC: 4.2 MMOL/L (ref 3.6–5.5)
PROT SERPL-MCNC: 5.5 G/DL (ref 6–8.2)
RBC # BLD AUTO: 3.57 M/UL (ref 4.7–6.1)
SODIUM SERPL-SCNC: 136 MMOL/L (ref 135–145)
WBC # BLD AUTO: 12.8 K/UL (ref 4.8–10.8)

## 2025-04-18 PROCEDURE — 700102 HCHG RX REV CODE 250 W/ 637 OVERRIDE(OP)

## 2025-04-18 PROCEDURE — 700102 HCHG RX REV CODE 250 W/ 637 OVERRIDE(OP): Performed by: NURSE PRACTITIONER

## 2025-04-18 PROCEDURE — 80053 COMPREHEN METABOLIC PANEL: CPT

## 2025-04-18 PROCEDURE — 83735 ASSAY OF MAGNESIUM: CPT

## 2025-04-18 PROCEDURE — A9270 NON-COVERED ITEM OR SERVICE: HCPCS | Performed by: NURSE PRACTITIONER

## 2025-04-18 PROCEDURE — 99232 SBSQ HOSP IP/OBS MODERATE 35: CPT | Mod: FS | Performed by: INTERNAL MEDICINE

## 2025-04-18 PROCEDURE — 99233 SBSQ HOSP IP/OBS HIGH 50: CPT | Mod: GC | Performed by: INTERNAL MEDICINE

## 2025-04-18 PROCEDURE — 85027 COMPLETE CBC AUTOMATED: CPT

## 2025-04-18 PROCEDURE — A9270 NON-COVERED ITEM OR SERVICE: HCPCS | Performed by: STUDENT IN AN ORGANIZED HEALTH CARE EDUCATION/TRAINING PROGRAM

## 2025-04-18 PROCEDURE — 700111 HCHG RX REV CODE 636 W/ 250 OVERRIDE (IP): Mod: JZ | Performed by: STUDENT IN AN ORGANIZED HEALTH CARE EDUCATION/TRAINING PROGRAM

## 2025-04-18 PROCEDURE — 97602 WOUND(S) CARE NON-SELECTIVE: CPT

## 2025-04-18 PROCEDURE — 99223 1ST HOSP IP/OBS HIGH 75: CPT | Performed by: HOSPITALIST

## 2025-04-18 PROCEDURE — 700102 HCHG RX REV CODE 250 W/ 637 OVERRIDE(OP): Performed by: INTERNAL MEDICINE

## 2025-04-18 PROCEDURE — 700102 HCHG RX REV CODE 250 W/ 637 OVERRIDE(OP): Performed by: STUDENT IN AN ORGANIZED HEALTH CARE EDUCATION/TRAINING PROGRAM

## 2025-04-18 PROCEDURE — 770000 HCHG ROOM/CARE - INTERMEDIATE ICU *

## 2025-04-18 PROCEDURE — 700111 HCHG RX REV CODE 636 W/ 250 OVERRIDE (IP): Mod: JZ | Performed by: HOSPITALIST

## 2025-04-18 PROCEDURE — A9270 NON-COVERED ITEM OR SERVICE: HCPCS

## 2025-04-18 PROCEDURE — 84100 ASSAY OF PHOSPHORUS: CPT

## 2025-04-18 PROCEDURE — 700111 HCHG RX REV CODE 636 W/ 250 OVERRIDE (IP): Performed by: INTERNAL MEDICINE

## 2025-04-18 PROCEDURE — 87493 C DIFF AMPLIFIED PROBE: CPT

## 2025-04-18 PROCEDURE — A9270 NON-COVERED ITEM OR SERVICE: HCPCS | Performed by: INTERNAL MEDICINE

## 2025-04-18 RX ORDER — METOPROLOL SUCCINATE 25 MG/1
25 TABLET, EXTENDED RELEASE ORAL
Status: DISCONTINUED | OUTPATIENT
Start: 2025-04-18 | End: 2025-04-18

## 2025-04-18 RX ORDER — METOPROLOL TARTRATE 1 MG/ML
5 INJECTION, SOLUTION INTRAVENOUS
Status: DISCONTINUED | OUTPATIENT
Start: 2025-04-18 | End: 2025-04-18

## 2025-04-18 RX ORDER — METOPROLOL SUCCINATE 50 MG/1
50 TABLET, EXTENDED RELEASE ORAL
Status: DISCONTINUED | OUTPATIENT
Start: 2025-04-19 | End: 2025-04-23 | Stop reason: HOSPADM

## 2025-04-18 RX ORDER — ACETAMINOPHEN 500 MG
1000 TABLET ORAL EVERY 6 HOURS
Status: DISCONTINUED | OUTPATIENT
Start: 2025-04-18 | End: 2025-04-18

## 2025-04-18 RX ORDER — DIGOXIN 0.25 MG/ML
250 INJECTION INTRAMUSCULAR; INTRAVENOUS EVERY 6 HOURS
Status: COMPLETED | OUTPATIENT
Start: 2025-04-18 | End: 2025-04-18

## 2025-04-18 RX ORDER — NOREPINEPHRINE BITARTRATE 0.03 MG/ML
0-1 INJECTION, SOLUTION INTRAVENOUS CONTINUOUS
Status: DISCONTINUED | OUTPATIENT
Start: 2025-04-18 | End: 2025-04-18

## 2025-04-18 RX ORDER — DIGOXIN 0.25 MG/ML
500 INJECTION INTRAMUSCULAR; INTRAVENOUS ONCE
Status: COMPLETED | OUTPATIENT
Start: 2025-04-18 | End: 2025-04-18

## 2025-04-18 RX ORDER — ACETAMINOPHEN 500 MG
1000 TABLET ORAL EVERY 6 HOURS PRN
Status: DISCONTINUED | OUTPATIENT
Start: 2025-04-18 | End: 2025-04-18

## 2025-04-18 RX ORDER — METOPROLOL SUCCINATE 25 MG/1
25 TABLET, EXTENDED RELEASE ORAL
Status: DISCONTINUED | OUTPATIENT
Start: 2025-04-19 | End: 2025-04-18

## 2025-04-18 RX ORDER — METOPROLOL TARTRATE 25 MG/1
12.5 TABLET, FILM COATED ORAL ONCE
Status: DISCONTINUED | OUTPATIENT
Start: 2025-04-18 | End: 2025-04-18

## 2025-04-18 RX ORDER — ACETAMINOPHEN 500 MG
1000 TABLET ORAL EVERY 6 HOURS PRN
Status: DISCONTINUED | OUTPATIENT
Start: 2025-04-18 | End: 2025-04-19

## 2025-04-18 RX ADMIN — CARBIDOPA AND LEVODOPA 2 TABLET: 25; 100 TABLET ORAL at 06:39

## 2025-04-18 RX ADMIN — DIGOXIN 250 MCG: 250 INJECTION, SOLUTION INTRAMUSCULAR; INTRAVENOUS; PARENTERAL at 22:07

## 2025-04-18 RX ADMIN — DIGOXIN 500 MCG: 250 INJECTION, SOLUTION INTRAMUSCULAR; INTRAVENOUS; PARENTERAL at 03:14

## 2025-04-18 RX ADMIN — RASAGILINE 0.5 MG: 1 TABLET ORAL at 06:40

## 2025-04-18 RX ADMIN — DIGOXIN 250 MCG: 250 INJECTION, SOLUTION INTRAMUSCULAR; INTRAVENOUS; PARENTERAL at 16:51

## 2025-04-18 RX ADMIN — DIBASIC SODIUM PHOSPHATE, MONOBASIC POTASSIUM PHOSPHATE AND MONOBASIC SODIUM PHOSPHATE 500 MG: 852; 155; 130 TABLET ORAL at 18:01

## 2025-04-18 RX ADMIN — THIAMINE HYDROCHLORIDE 200 MG: 100 INJECTION, SOLUTION INTRAMUSCULAR; INTRAVENOUS at 18:01

## 2025-04-18 RX ADMIN — DIBASIC SODIUM PHOSPHATE, MONOBASIC POTASSIUM PHOSPHATE AND MONOBASIC SODIUM PHOSPHATE 250 MG: 852; 155; 130 TABLET ORAL at 11:30

## 2025-04-18 RX ADMIN — ASPIRIN 81 MG: 81 TABLET, COATED ORAL at 06:44

## 2025-04-18 RX ADMIN — CARBIDOPA AND LEVODOPA 2 TABLET: 25; 100 TABLET ORAL at 17:58

## 2025-04-18 RX ADMIN — APIXABAN 5 MG: 5 TABLET, FILM COATED ORAL at 06:39

## 2025-04-18 RX ADMIN — PRAVASTATIN SODIUM 40 MG: 20 TABLET ORAL at 22:10

## 2025-04-18 RX ADMIN — METOPROLOL SUCCINATE 25 MG: 25 TABLET, EXTENDED RELEASE ORAL at 11:28

## 2025-04-18 RX ADMIN — CARBIDOPA AND LEVODOPA 2 TABLET: 25; 100 TABLET ORAL at 11:28

## 2025-04-18 RX ADMIN — ACETAMINOPHEN 1000 MG: 500 TABLET, FILM COATED ORAL at 00:22

## 2025-04-18 RX ADMIN — ACETAMINOPHEN 1000 MG: 500 TABLET, FILM COATED ORAL at 06:41

## 2025-04-18 RX ADMIN — ROTIGOTINE 8 MG: 8 PATCH, EXTENDED RELEASE TRANSDERMAL at 06:43

## 2025-04-18 RX ADMIN — APIXABAN 5 MG: 5 TABLET, FILM COATED ORAL at 17:57

## 2025-04-18 SDOH — ECONOMIC STABILITY: TRANSPORTATION INSECURITY
IN THE PAST 12 MONTHS, HAS THE LACK OF TRANSPORTATION KEPT YOU FROM MEDICAL APPOINTMENTS OR FROM GETTING MEDICATIONS?: NO

## 2025-04-18 SDOH — ECONOMIC STABILITY: TRANSPORTATION INSECURITY
IN THE PAST 12 MONTHS, HAS LACK OF RELIABLE TRANSPORTATION KEPT YOU FROM MEDICAL APPOINTMENTS, MEETINGS, WORK OR FROM GETTING THINGS NEEDED FOR DAILY LIVING?: NO

## 2025-04-18 ASSESSMENT — ENCOUNTER SYMPTOMS
MYALGIAS: 1
PALPITATIONS: 0
SHORTNESS OF BREATH: 0
BACK PAIN: 0
CHILLS: 0
DIARRHEA: 0
NAUSEA: 0
COUGH: 0
HEADACHES: 0
CHEST TIGHTNESS: 0
VOMITING: 0
ABDOMINAL PAIN: 0
DIZZINESS: 0
LOSS OF CONSCIOUSNESS: 0
FEVER: 0

## 2025-04-18 ASSESSMENT — FIBROSIS 4 INDEX
FIB4 SCORE: 3.36
FIB4 SCORE: 3.36

## 2025-04-18 ASSESSMENT — PAIN DESCRIPTION - PAIN TYPE
TYPE: ACUTE PAIN

## 2025-04-18 ASSESSMENT — SOCIAL DETERMINANTS OF HEALTH (SDOH)
WITHIN THE PAST 12 MONTHS, THE FOOD YOU BOUGHT JUST DIDN'T LAST AND YOU DIDN'T HAVE MONEY TO GET MORE: NEVER TRUE
WITHIN THE PAST 12 MONTHS, YOU WORRIED THAT YOUR FOOD WOULD RUN OUT BEFORE YOU GOT THE MONEY TO BUY MORE: NEVER TRUE
IN THE PAST 12 MONTHS, HAS THE ELECTRIC, GAS, OIL, OR WATER COMPANY THREATENED TO SHUT OFF SERVICE IN YOUR HOME?: NO

## 2025-04-18 NOTE — DISCHARGE PLANNING
Case Management Discharge Planning    Admission Date: 4/16/2025  GMLOS: 1.8  ALOS: 2    6-Clicks ADL Score: 17  6-Clicks Mobility Score: 18    Anticipated Discharge Dispo: Discharge Disposition: D/T to home under [facility] care in anticipation of covered skilled care (06)      Action(s) Taken: RNCM has been notified multiple times that patient is to be referred to [facility] rehabilitation per patients spouse. Patient is currently pending PT/OT eval for DC recs. RNCM placed call to [facility] to see if they have received a referral.     Escalations Completed: None    Medically Clear: No    Next Steps: RNCM will follow up with patients spouse as it looks like the referral was for home health. RNCM will also wait for PT/OT for DC recs.     Barriers to Discharge: Medical clearance, Pending Placement, and Transportation    Is the patient up for discharge tomorrow: No    1258 RNCM placed call to [facility] to see if referral was sent to them. RNCM spoke with Abigail who stated he is in the chart but no referral was sent. RNCM stated she will follow up and send referral pending PT/OT recs.     [id number] RNCM received return phone call from Karen at [facility]. They have not received a referral for patient. RNCM will send (fax: [phone]), however patient is still pending PT/OT eval.

## 2025-04-18 NOTE — PROGRESS NOTES
0225 notified Dr. Nicholas that the patient's HR was sustaining greater than 120 in afib with SBP in the 80-90s. Dr. Nicholas placed orders for metoprolol push and levo.     0230 notified Dr. Feliciano of the patient's changes in HR and blood pressure and the plan of care set in place by Dr. Nicholas. Dr. Feliciano placed an order for digoxin.

## 2025-04-18 NOTE — PROGRESS NOTES
Cardiology Follow Up Progress Note    Date of Service  4/18/2025    Attending Physician  Kike Woods M.D.    Chief Complaint   Atrial fibrillation, aortic stenosis, hypotension     HPI  Osman Choi is a 80 y.o. male admitted 4/16/2025 as a transfer from [facility] for aortic stenosis, atrial fibrillation, and hypotension.     He has a history of Parkinsonism, moderate to severe aortic stenosis, atrial fibrillation, on Eliquis, and recent shoulder fracture 4/13 with repair on 4/14.     4/16: He converted to atrial fibrillation and hypotension despite fluid replacement post procedure. An ECHO showed severe aortic stenosis. He was transferred to our facility for higher level of care.     Our service was consulted for management of suspected severe aortic stenosis.     Interim Events  4/16: Atrial fibrillation with rapid ventricular rate, 78-150bpm. On Norepinephrine. Confused, agitated. TTE showing EF 75% with normal RV and LV function    4/17: No acute events overnight. Mentation improving, on dex. Atrial fibrillation rate of 103-143bpm. Off norepinephrine.     4/18: Atrial fibrillation with rapid ventricular response. -148, SBP /55-72. On room air. Digoxin 500mcg x 1 given. Metoprolol held for hypotension. Mentation improving, off of precedex, able to hold conversation. H/H stable, 10.8/33.0. Mg. 2.5, K 4.2.     Review of Systems  Review of Systems   Respiratory:  Negative for shortness of breath.    Cardiovascular:  Negative for chest pain, palpitations and leg swelling.       Vital signs in last 24 hours  Temp:  [36.7 °C (98 °F)-37.1 °C (98.7 °F)] 37 °C (98.6 °F)  Pulse:  [] 133  Resp:  [14-38] 16  BP: ()/(53-92) 128/75  SpO2:  [89 %-98 %] 98 %    Physical Exam  Physical Exam  Constitutional:       General: He is not in acute distress.  HENT:      Head: Normocephalic and atraumatic.      Mouth/Throat:      Mouth: Mucous membranes are moist.   Eyes:      Extraocular  "Movements: Extraocular movements intact.      Pupils: Pupils are equal, round, and reactive to light.   Cardiovascular:      Rate and Rhythm: Tachycardia present. Rhythm irregular.      Pulses: Normal pulses.      Heart sounds: Murmur heard.      Systolic murmur is present with a grade of 3/6.      Comments: No upstroke   Pulmonary:      Effort: No respiratory distress.      Breath sounds: Decreased air movement present.   Abdominal:      Palpations: Abdomen is soft.   Musculoskeletal:      Right lower leg: No edema.      Left lower leg: No edema.   Skin:     General: Skin is warm and dry.      Capillary Refill: Capillary refill takes 2 to 3 seconds.   Neurological:      General: No focal deficit present.      Mental Status: He is alert. He is disoriented.         Lab Review  Lab Results   Component Value Date/Time    WBC 12.8 (H) 04/18/2025 03:11 AM    RBC 3.57 (L) 04/18/2025 03:11 AM    HEMOGLOBIN 10.8 (L) 04/18/2025 03:11 AM    HEMATOCRIT 33.0 (L) 04/18/2025 03:11 AM    MCV 92.4 04/18/2025 03:11 AM    MCH 30.3 04/18/2025 03:11 AM    MCHC 32.7 04/18/2025 03:11 AM    MPV 9.7 04/18/2025 03:11 AM      Lab Results   Component Value Date/Time    SODIUM 136 04/18/2025 03:11 AM    POTASSIUM 4.2 04/18/2025 03:11 AM    CHLORIDE 105 04/18/2025 03:11 AM    CO2 19 (L) 04/18/2025 03:11 AM    GLUCOSE 94 04/18/2025 03:11 AM    BUN 17 04/18/2025 03:11 AM    CREATININE 0.66 04/18/2025 03:11 AM      Lab Results   Component Value Date/Time    ASTSGOT 19 04/18/2025 03:11 AM    ALTSGPT <5 04/18/2025 03:11 AM     No results found for: \"CHOLSTRLTOT\", \"LDL\", \"HDL\", \"TRIGLYCERIDE\", \"TROPONINT\"    No results for input(s): \"NTPROBNP\" in the last 72 hours.    Cardiac Imaging and Procedures Review  EKG:  My personal interpretation of the EKG dated 4/16 is atrial fibrillation, rate of 117 bpm     Echocardiogram:  4/16  EF 75% with normal RV and LV function, difficult to assess aortic valve stenosis.     Cardiac cath: n/a     Imaging  Chest " X-Ray:  4/16 cardiomegaly, no acute process, right basilar density      Stress Test:  n/a     Assessment/Plan  No new Assessment & Plan notes have been filed under this hospital service since the last note was generated.  Service: Cardiology    80 y.o. male admitted 4/16/2025 as a transfer from [facility] for aortic stenosis, atrial fibrillation, and hypotension.     Aortic Stenosis   -h/o, difficult to assess degree on TTE r/t arrhythmia   -no upstroke on PE   -recommend MICH and further workup outpatient   -keep euvolemic     Atrial fibrillation with rapid ventricular response   -h/o, on eliquis  -EKG 4/16 trial fibrillation, rate of 117 bpm   -HR remains 105-133, patient denies chest pain, SOB, palpitations   -off vasoactive medications   -no indication for rhythm control at this time, long-standing history, asymptomatic, hemodynamically stable   -digoxin started overnight  -decrease Metoprolol SR dose to 25mg daily with hold parameters   -continue Eliquis     Deconditioning   -appreciate management per primary team

## 2025-04-18 NOTE — PROGRESS NOTES
Notified the resident, Dr. Nicholas, that the patient's SBP has been in the high 80s with a MAP greater than 65 after receiving a dose of metoprolol during day shift. Dr. Nicholas gave verbal orders to notify her if the patient's MAP sustains below 65 to reorder levo. Dr. Nicholas also notified of the patient having multiple loose bowel movements. Dr. Nicholas gave orders to notify her if the patient has another loose bowel movement so that c.diff rule out protocol can be order.

## 2025-04-18 NOTE — CARE PLAN
The patient is Watcher - Medium risk of patient condition declining or worsening    Shift Goals  Clinical Goals: MAP greater than 65, RASS 1/-1  Patient Goals: rest  Family Goals: updates, no caf in coffee    Progress made toward(s) clinical / shift goals:    Problem: Pain - Standard  Goal: Alleviation of pain or a reduction in pain to the patient’s comfort goal  Outcome: Progressing  Note: Medication administered per MAR.      Problem: Fall Risk  Goal: Patient will remain free from falls  Outcome: Progressing     Problem: Skin Integrity  Goal: Skin integrity is maintained or improved  Outcome: Progressing  Note: Pressure injury prevention protocol, wound consult, meplex, pillows, taps/wedges, barrier paste/wipes, condom cath, and q2h turns utilized.

## 2025-04-18 NOTE — PROGRESS NOTES
[facility] ICU Progress Note      Admit Date: 4/16/2025    Resident(s): Prince ELIDA Kline D.O.   Attending:  VERONICA MCKINNON/ Dr. Woods    Patient ID:    Name:  Osman Choi   YOB: 1945  Age:  80 y.o.  male   MRN:  [id number]    Hospital Course (carried forward and updated):  Osman Choi is a 80 y.o. male with moderate to severe aortic stenosis, atrial fibrillation on Eliquis, Parkinson's, recent ground level fall complicated by left closed fracture of left proximal humerus on 4/13 with repair on 4/14 who was transferred from [facility] to [facility] for severe aortic stenosis for emergent aortic valve repair.     He underwent a repair for left closed fracture on 4/14. His postoperative course was complicated by progressive hypotension unresponsive to fluids. Also, entered AF on 4/15. Echo presented critical aortic senosis with aortic valve area of 0.55 cm^2 and a mean gradient of 45 mm hg. He was transferred to [facility] for emergent aortic valve repair. Echo 4/16/25 presented hyperdynamic LVEF of 75% and unable to assess aortic stenosis.    Consultants:  Critical Care  Cardiology    Interval Events:  Afib RVR of 120 overnight with SBP of 80-90s, received one time dose of digoxin  Off precedex in the morning.  C.Diff protocol  Patient stated that he is doing much better and want to go home  Downgrade to [facility]      Vitals Range last 24h:  Temp:  [36.7 °C (98 °F)-37.1 °C (98.7 °F)] 36.8 °C (98.2 °F)  Pulse:  [] 122  Resp:  [14-40] 25  BP: ()/(53-92) 104/72  SpO2:  [89 %-98 %] 95 %      Intake/Output Summary (Last 24 hours) at 4/18/2025 1044  Last data filed at 4/18/2025 0600  Gross per 24 hour   Intake 49.1 ml   Output --   Net 49.1 ml        Review of Systems   Constitutional:  Negative for chills and fever.   Cardiovascular:  Negative for chest pain and palpitations.   Gastrointestinal:  Negative for abdominal pain, nausea and vomiting.   Genitourinary:  Negative for dysuria and frequency.    Musculoskeletal:  Positive for myalgias.        Reports pain on left shoulder on surgical site 4,5/10   Neurological:  Negative for dizziness and headaches.       PHYSICAL EXAM:  Vitals:    04/18/25 0700 04/18/25 0800 04/18/25 0900 04/18/25 1000   BP: 128/75 112/76 115/77 104/72   Pulse: (!) 133 (!) 134 (!) 127 (!) 122   Resp: 16 (!) 26 (!) 40 (!) 25   Temp:  36.8 °C (98.2 °F)     TempSrc:  Temporal     SpO2: 98% 93% 90% 95%   Weight:       Height:        Body mass index is 25.15 kg/m².    O2 therapy: Pulse Oximetry: 95 %, O2 (LPM): 0, O2 Delivery Device: Room air w/o2 available    Date 04/18/25 0700 - 04/19/25 0659   Shift 3391-1509 4131-5505 4542-0916 24 Hour Total   INTAKE   Shift Total       OUTPUT   Stool         Number of Times Stooled 2 x   2 x   Shift Total       NET              Physical Exam  Constitutional:       General: He is not in acute distress.  HENT:      Head: Normocephalic and atraumatic.      Mouth/Throat:      Mouth: Mucous membranes are moist.      Pharynx: Oropharynx is clear. No oropharyngeal exudate.   Eyes:      Extraocular Movements: Extraocular movements intact.      Pupils: Pupils are equal, round, and reactive to light.   Cardiovascular:      Rate and Rhythm: Tachycardia present. Rhythm irregular.      Heart sounds: Murmur heard.      Comments: Systolic murmur all over the place but pronounced on the left 2nd intercostal space.   Pulmonary:      Effort: Pulmonary effort is normal. No respiratory distress.      Breath sounds: No wheezing.      Comments: Decreased respiratory effort  Abdominal:      General: Abdomen is flat. Bowel sounds are normal.      Tenderness: There is no abdominal tenderness. There is no guarding or rebound.   Musculoskeletal:      Right lower leg: No edema.      Left lower leg: No edema.      Comments: Swollen on left UE. Radial pulse +1   Skin:     General: Skin is warm and dry.   Neurological:      General: No focal deficit present.      Mental Status: He is  alert and oriented to person, place, and time.             Recent Labs     04/16/25 0429 04/17/25 0320 04/18/25 0311   SODIUM 137 137 136   POTASSIUM 4.1 4.6 4.2   CHLORIDE 106 106 105   CO2 20 22 19*   BUN 14 10 17   CREATININE 0.65 0.53 0.66   MAGNESIUM 2.0 2.8* 2.5   PHOSPHORUS 1.9* 2.7 2.4*   CALCIUM 8.0* 8.0* 7.8*     Recent Labs     04/16/25 0429 04/17/25 0320 04/18/25 0311   ALTSGPT 6 6 <5   ASTSGOT 19 18 19   ALKPHOSPHAT 32 34 35   TBILIRUBIN 0.7 1.0 1.2   GLUCOSE 113* 115* 94     Recent Labs     04/16/25 0429 04/17/25 0320 04/18/25 0311   RBC 3.29* 3.52* 3.57*   HEMOGLOBIN 10.2* 10.4* 10.8*   HEMATOCRIT 31.1* 32.4* 33.0*   PLATELETCT 161* 171 208     Recent Labs     04/16/25 0429 04/17/25 0320 04/18/25 0311   WBC 13.0* 10.3 12.8*   ASTSGOT 19 18 19   ALTSGPT 6 6 <5   ALKPHOSPHAT 32 34 35   TBILIRUBIN 0.7 1.0 1.2       Meds:   phosphorus  500 mg      acetaminophen  1,000 mg      Pharmacy  1 Each      metoprolol SR  25 mg      apixaban  5 mg      MD Alert...Adult ICU Electrolyte Replacement per Pharmacy        aspirin  81 mg      carbidopa-levodopa  2 Tablet      pravastatin  40 mg      rasagiline  0.5 mg      dexmedetomidine (Precedex) infusion  0.1-1.5 mcg/kg/hr (Ideal) 0.2 mcg/kg/hr (04/18/25 0208)    thiamine  200 mg      Pharmacy Consult Request  1 Each      oxyCODONE immediate-release  2.5 mg      Or    oxyCODONE immediate-release  5 mg      Or    HYDROmorphone  0.25 mg      Rotigotine  8 mg      ropivacaine 0.2 % (Naropin) 745 mL in On-Q Pump infusion   4 mL/hr at 04/16/25 1430    haloperidol lactate  1 mg          Procedures:  None    Imaging:  EC-ECHOCARDIOGRAM COMPLETE W/O CONT   Final Result      EC-ECHOCARDIOGRAM COMPLETE W/ CONT         DX-CHEST-LIMITED (1 VIEW)   Final Result         1.  Bilateral basilar atelectasis, no focal infiltrate   2.  Cardiomegaly          ASSESSEMENT and PLAN:    * Aortic stenosis  Assessment & Plan  At [facility], echo presented critical aortic senosis  with aortic valve area of 0.55 cm^2 and a mean gradient of 45 mm hg. He was transferred to [facility] for emergent aortic valve repair  Echo 4/16/25 presented hyperdynamic LVEF of 75% and unable to assess aortic stenosis.  - Cardiology signed off, appreciate recommendation  Maintain euvolemia  - Follow up outpatient cardiology for further cardiac care      Atrial fibrillation with RVR(HCC)  Assessment & Plan  Likely secondary to trauma and resulted shoulder fracture  Optimize electrolytes  Continue home Eliquis  Precedex  On metoprolol SA    Diarrhea  Assessment & Plan  - C.Diff PCR pending  - Contact precaution  - Monitor I/Os    Delirium  Assessment & Plan  Likely, derilium prone due to underlying Parkinson's disease in the setting of hospitalization or alcohol withdrawal   U/A was unremarkable. Urine druge screen was unremarkable.  Off precedex in the morning of 4/18/25      Parkinson's disease with dyskinesia without fluctuating manifestations (HCC)- (present on admission)  Assessment & Plan  Continue his Rotigotine, Sinemet and rasagiline    HLD (hyperlipidemia)  Assessment & Plan  Continue statin    Hypotension  Assessment & Plan  Suspect secondary to severe AS s/p operation now in AF  - Same plan as Severe aortic stenosis.    Hypophosphatemia  Assessment & Plan  - replete electrolytes as needed    Shoulder fracture  Assessment & Plan  OR at [facility] 4/14    Keep dressing in place until seen outpatient in ortho clinic, unless it is saturated (currently it is not saturated)    Ok to shower, keep out of direct spray  Keep fingers moving: makes a strong fist, hold and then release.  Repeat this process throughout the day    NO lifting, pushing / pulling or carrying with the left arm until cleared in ortho clinic  Reached out to orthopedic surgeon at [facility] who performed repair and receive instructions for management on 4/16/25  - PT/OT placed        DISPO: Downgrade to CU    CODE STATUS: Full     Quality  Measures:  Feeding: Regular  Analgesia: Tylenol, ropivacaine and oxy  Sedation: None  Thromboprophylaxis: Eliquis  Head of bed: >30 degrees  Ulcer prophylaxis: None  Glycemic control: None  Bowel care: bowel regimen : Senna-docusate and miralax  Indwelling lines: pIVs  Deescalation of antibiotics: None      Prince ELIDA Kline D.O.   PGY-2 Internal medicine

## 2025-04-18 NOTE — ASSESSMENT & PLAN NOTE
4/22/2025  Status post SHOULDER REVERSE ARTHROPLASTY TOTAL REPLACEMENT, NM ARTHROPLASTY GLENOHUMERAL JOINT TOTAL SHOULDER on 4/14/25..  Continue pain management  Will need follow-up with orthopedic surgery as outpatient.  Continue sling for comfort

## 2025-04-18 NOTE — ASSESSMENT & PLAN NOTE
4/22/2025  Thought to be due to vol vs cardiogenic vs less likely septic  In setting of severe aortic stenosis    Improving, continue monitoring

## 2025-04-18 NOTE — PROGRESS NOTES
"Cardiology Follow Up Progress Note    Date of Service  4/18/2025    Attending Physician  Kike Woods M.D.    Chief Complaint   Atrial fibrillation, Aortic stenosis, hypotension     HPI  Osman Choi is a 80 y.o. male admitted 4/16/2025 with per Dr. Guevara, \"history of Parkinson's, aortic stenosis, atrial fibrillation on Eliquis, recent fall with shoulder fracture status post repair 2 days ago, presented to [facility] for further care from outside facility. Patient was concerned due to uncontrolled atrial fibrillation in terms of rate. He also had some hypotension episodes and was started on Levophed.\"    Interim Events  4/17/2025: No overnight cardiac events. Tele monitoring personally interpreted by me shows afib . VSS; 2-4L NC. Labs reviewed, notable for H+H 10.4+32.4.   4/18/2025: No overnight cardiac events. Tele monitoring personally interpreted by me shows Afib 130's. Beta blocker held this AM; discussed with nursing. VSS; RA. Labs reviewed.   Disposition: Optimize rate control. Shared decision making with family and Dr. Guevara. [facility] outpatient. Follow up scheduled   Future Appointments   Date Time Provider Department Center   5/8/2025  9:30 AM Wes Hoskins M.D. [facility] None   5/14/2025  8:40 AM Pritesh Mead D.O. [facility] None   8/14/2025 11:20 AM ALDAIR Chavez None   12/4/2025 11:20 AM ALDAIR Chavez None       Review of Systems  Review of Systems   Respiratory:  Negative for chest tightness and shortness of breath.    Cardiovascular:  Negative for chest pain, palpitations and leg swelling.       Vital signs in last 24 hours  Temp:  [36.7 °C (98 °F)-37.1 °C (98.7 °F)] 37 °C (98.6 °F)  Pulse:  [] 133  Resp:  [14-38] 16  BP: ()/(53-92) 128/75  SpO2:  [89 %-98 %] 98 %    Physical Exam  Physical Exam  Vitals and nursing note reviewed.   Constitutional:       General: He is not in acute distress.  Cardiovascular:      Rate and Rhythm: Tachycardia present. Rhythm irregular.     " " Pulses: Normal pulses.      Heart sounds: Murmur heard.      Systolic murmur is present with a grade of 3/6.   Pulmonary:      Effort: Pulmonary effort is normal. No respiratory distress.   Abdominal:      Palpations: Abdomen is soft.   Musculoskeletal:         General: No swelling.      Cervical back: Normal range of motion.      Right lower leg: No edema.      Left lower leg: No edema.   Skin:     General: Skin is warm and dry.   Neurological:      General: No focal deficit present.      Mental Status: He is alert and oriented to person, place, and time. Mental status is at baseline.   Psychiatric:         Mood and Affect: Mood normal.         Behavior: Behavior normal.         Lab Review  Lab Results   Component Value Date/Time    WBC 12.8 (H) 04/18/2025 03:11 AM    RBC 3.57 (L) 04/18/2025 03:11 AM    HEMOGLOBIN 10.8 (L) 04/18/2025 03:11 AM    HEMATOCRIT 33.0 (L) 04/18/2025 03:11 AM    MCV 92.4 04/18/2025 03:11 AM    MCH 30.3 04/18/2025 03:11 AM    MCHC 32.7 04/18/2025 03:11 AM    MPV 9.7 04/18/2025 03:11 AM      Lab Results   Component Value Date/Time    SODIUM 136 04/18/2025 03:11 AM    POTASSIUM 4.2 04/18/2025 03:11 AM    CHLORIDE 105 04/18/2025 03:11 AM    CO2 19 (L) 04/18/2025 03:11 AM    GLUCOSE 94 04/18/2025 03:11 AM    BUN 17 04/18/2025 03:11 AM    CREATININE 0.66 04/18/2025 03:11 AM      Lab Results   Component Value Date/Time    ASTSGOT 19 04/18/2025 03:11 AM    ALTSGPT <5 04/18/2025 03:11 AM     No results found for: \"CHOLSTRLTOT\", \"LDL\", \"HDL\", \"TRIGLYCERIDE\", \"TROPONINT\"    No results for input(s): \"NTPROBNP\" in the last 72 hours.    Cardiac Imaging and Procedures Review    Echocardiogram (4/16/2025):    Hyperdynamic left ventricular systolic function.  The left ventricular ejection fraction is visually estimated to be 75%.  Diastolic function is difficult to assess with arrhythmia.  Normal right ventricular size and systolic function.  Unable to accurately asses aortic valve " stenosis.      Assessment/Plan  #Afib with RVR  #Aortic stenosis  #HLD  #Parkinson's Disease  #Delerium, resolved  #Shoulder fracture    -Continue rate control. Decrease metop d/t hypotension  -OAC  -EF preserved  -continue statin  -[facility] outpatient with structural heart clinic referral  -Rehab per primary team    Thank you for allowing me to participate in the care of this patient.    Please contact me with any questions.    Please see Dr. Guevara's attestation for further details and MDM.     I personally spent a total of 10 minutes which includes face-to-face time and non-face-to-face time spent on preparing to see the patient, reviewing hospital notes and tests, obtaining history from the patient, performing a medically appropriate exam, counseling and educating the patient, ordering medications/tests/procedures/referrals as clinically indicated, and documenting information in the electronic medical record.    TREMAYNE Heaton, CCK, HF-CERT   [facility]  [phone]

## 2025-04-18 NOTE — PROGRESS NOTES
Prior to administration of Metoprolol 12.5 mg PO, patient's BP 92/53. Informed Dr. Rose and received order to hold Metoprolol and give Digoxin 250 mcg IV every 6 hours.

## 2025-04-18 NOTE — CARE PLAN
"The patient is Stable - Low risk of patient condition declining or worsening    Shift Goals  Clinical Goals: Patient and family will verbalize understanding of POC. Patiuent will be up to chair for meals. Patient's pain will be well controlled throughout shift. Patient will remain free from skin breakdown. Patient will remain free from fround level falls.  Patient Goals: \"Get walking.\"  Family Goals: \"Information and updates on discharge planning.\"    Progress made toward(s) clinical / shift goals:  Patient and family provided a verbal discussion related to POC. Patient and family verbalized understanding and had questions regarding whether patient will require a skilled nursing facility for post-acute rehab after this admission as part of discharge plan. Patient and family were provided an explanation that PT/OT would assess the client for needs and make discharge recommendations based on patient's needs. Pt is able to verbalize pain on a scale of 1-10 and is able to verbalize comfort goal. Pain management plan followed and pt educated on nonpharmacologic and pharmacological comfort measures. Pt remains free from falls at this time. Safety precautions in place. Pt educated on calling for assistance when needed. Proper hand hygiene before and after patient care to ensure patient will remain free from infection.      Patient is not progressing towards the following goals: Wife stated that regardless of PT/OT recommendations she would like her  to go to a SNF for post-acute care following admission.       "

## 2025-04-18 NOTE — PROGRESS NOTES
Dr. Michael Rose presented to bedside to speak with patient and spouse regarding plan of care and Cardiology's recommendations to move forward with Metoprolol for patient's atrial fibrillation. Patient heart rate currently 130-150's. Received order for 12.5 mg PO Metoprolol once.

## 2025-04-18 NOTE — ASSESSMENT & PLAN NOTE
4/22/2025  On apixaban  Had been on propafenone as an outpt: structural cardiac team recommend stopping  Based on underlying structural heart disease Cards feel unlikely to do well with amio  Has had hypotension with metoprolol  Now s/p IV Digoxin load: cont 125mcg daily  Unclear how much of his tachycardic events are driven by external issues (agitation/anxiety)    Continue digoxin  Avoid beta-blockers since this can cause hypotension on this patient    Better control with digoxin

## 2025-04-18 NOTE — WOUND TEAM
[facility]  Inpatient Services  Wound and Skin Care Brief Evaluation    Admission Date: 4/16/2025     Last order of IP CONSULT TO WOUND CARE was found on 4/17/2025 from Hospital Encounter on 4/15/2025     HPI, PMH, SH: Reviewed    No chief complaint on file.    Diagnosis: Severe aortic stenosis [I35.0]    Unit where seen by Wound Team: T610/00     Wound consult placed regarding buttocks and coccyx. Chart and images reviewed. This discussed with bedside RN. This clinician in to assess patient. Patient pleasant and agreeable. Buttocks with blanchable erythema, barrier paste recommended as patient is incontinent.  Coccyx intact and blanching.  BL heels intact and blanching.    No pressure injuries or advanced wound care needs identified. Wound consult completed. Wound team signing off, re-consult if patient has further advanced wound care needs.         PREVENTATIVE INTERVENTIONS:    Q shift Micah - performed per nursing policy  Q shift pressure point assessments - performed per nursing policy    Surface/Positioning  ICU Low Airloss - Currently in Place  Reposition q 2 hours with wedges - Currently in Place    Offloading/Redistribution  Heel offloading dressing (Silicone dressing) - Currently in Place  Float Heels off Bed with Pillows - Currently in Place       Elbow offloading with pillows - Currently in Place    Containment/Moisture Prevention    Barrier wipes - Ordered for nursing to apply  Barrier paste - Ordered for nursing to apply

## 2025-04-18 NOTE — ASSESSMENT & PLAN NOTE
4/22/2025  Mental status is markedly improved today  Cont to minimize sedating medications  Start scheduled APAP and qhs melatonin    Improved back to baseline

## 2025-04-18 NOTE — CONSULTS
"Hospital Medicine Consultation    Date of Service  4/18/2025    Referring Physician  AC Woods    Consulting Physician  Michael Rose D.O.    Reason for Consultation  hypotension    History of Presenting Illness  80 y.o. male who presented 4/16/2025 with Parkinson's, mod/severe AS, AFib on apixaban, ETOH use with a recent shoulder Fx s/p ORIF 4/13-14.  Pt had been recovering at [facility] when he developed hypotension and was sent to us for evaluation given his hewly Dx'd severe aortic stenosis.  Here pt was admitted to the ICU and given fluid resuscitation as well as Digoxin IV load..  He has had significant issues with agitation and delerium requiring precedex drip    On my examination the pt states he's feels \"good\".  Brother at bedside states he's doing much better today, is now thinking more clearly and much more like his normal self.    Review of Systems  Review of Systems   Constitutional:  Negative for chills and fever.   Respiratory:  Negative for cough and shortness of breath.    Cardiovascular:  Negative for chest pain, palpitations and leg swelling.   Gastrointestinal:  Negative for abdominal pain, diarrhea, nausea and vomiting.   Genitourinary:  Negative for dysuria and urgency.   Musculoskeletal:  Negative for back pain.        L shoulder pain   Skin:  Negative for rash.   Neurological:  Negative for dizziness, loss of consciousness and headaches.       Past Medical History   has a past medical history of Parkinson disease (HCC).    Surgical History   has a past surgical history that includes orif, shoulder.    Family History  family history is not on file.    Social History   reports that he has never smoked. He has never used smokeless tobacco. He reports current alcohol use of about 8.4 oz of alcohol per week. He reports that he does not use drugs.    Medications  Prior to Admission Medications   Prescriptions Last Dose Informant Patient Reported? Taking?   Cholecalciferol (D3 2000 PO) " 4/15/2025 Morning  Yes Yes   Sig: Take 1 Tablet by mouth every day.   Cyanocobalamin (B-12) 1000 MCG Tab 4/15/2025 Morning  Yes Yes   Sig: Take 1 Tablet by mouth every day.   Rasagiline Mesylate 0.5 MG Tab 4/15/2025 Morning  No Yes   Sig: Take 1 Tablet by mouth every day. For Parkinson's   Rotigotine (NEUPRO) 8 MG/24HR PATCH 24 HR 4/15/2025 Morning  No Yes   Sig: Place 8 mg on the skin every day. For Parkinson's   alendronate (FOSAMAX) 70 MG Tab   Yes No   Sig: Take 70 mg by mouth every 7 days.   apixaban (ELIQUIS) 5mg Tab 4/15/2025 Morning  Yes Yes   Sig: Take 5 mg by mouth 2 times a day.   aspirin 81 MG EC tablet 4/15/2025 Morning  Yes Yes   Sig: Take 81 mg by mouth every day.   carbidopa-levodopa (SINEMET)  MG Tab 4/15/2025 Morning  No Yes   Sig: Take 2 Tablets by mouth 3 times a day. For Parkinson's   pravastatin (PRAVACHOL) 40 MG tablet 4/14/2025  Yes Yes   Sig: Take 40 mg by mouth every evening.   propafenone (RYTHMOL) 150 MG Tab 4/14/2025  Yes No   Sig: Take 150 mg by mouth every 8 hours.      Facility-Administered Medications Last Administration Doses Remaining   onabotulinum toxin type A (Botox) injection 100 Units 4/11/2025  2:38 PM 2          Allergies  No Known Allergies    Physical Exam  Temp:  [36.7 °C (98 °F)-37.1 °C (98.7 °F)] 36.8 °C (98.2 °F)  Pulse:  [] 122  Resp:  [14-40] 25  BP: ()/(53-92) 104/72  SpO2:  [89 %-98 %] 95 %    Physical Exam  Constitutional:       General: He is not in acute distress.     Appearance: He is well-developed. He is not diaphoretic.   HENT:      Head: Normocephalic and atraumatic.   Eyes:      Conjunctiva/sclera: Conjunctivae normal.   Neck:      Vascular: No JVD.   Cardiovascular:      Rate and Rhythm: Tachycardia present. Rhythm irregular.      Heart sounds: Murmur heard.      No gallop.   Pulmonary:      Effort: Pulmonary effort is normal. No respiratory distress.      Breath sounds: No stridor. No wheezing or rales.   Abdominal:      Palpations:  Abdomen is soft.      Tenderness: There is no abdominal tenderness. There is no guarding or rebound.   Musculoskeletal:      Right lower leg: No edema.      Left lower leg: No edema.      Comments: L arm in a sling with post op dressings in place   Skin:     General: Skin is warm and dry.      Capillary Refill: Capillary refill takes less than 2 seconds.      Findings: No rash.   Neurological:      Mental Status: He is oriented to person, place, and time.   Psychiatric:         Mood and Affect: Mood normal.         Behavior: Behavior normal.         Thought Content: Thought content normal.         Fluids  Date 04/18/25 0700 - 04/19/25 0659   Shift 7874-8167 0030-9290 6568-0904 24 Hour Total   INTAKE   P.O. 150   150   Shift Total 150   150   OUTPUT   Shift Total       Weight (kg) 79.5 79.5 79.5 79.5       Laboratory  Recent Labs     04/16/25 0429 04/17/25  0320 04/18/25  0311   WBC 13.0* 10.3 12.8*   RBC 3.29* 3.52* 3.57*   HEMOGLOBIN 10.2* 10.4* 10.8*   HEMATOCRIT 31.1* 32.4* 33.0*   MCV 94.5 92.0 92.4   MCH 31.0 29.5 30.3   MCHC 32.8 32.1* 32.7   RDW 48.5 47.0 48.5   PLATELETCT 161* 171 208   MPV 9.7 9.5 9.7     Recent Labs     04/16/25 0429 04/17/25  0320 04/18/25  0311   SODIUM 137 137 136   POTASSIUM 4.1 4.6 4.2   CHLORIDE 106 106 105   CO2 20 22 19*   GLUCOSE 113* 115* 94   BUN 14 10 17   CREATININE 0.65 0.53 0.66   CALCIUM 8.0* 8.0* 7.8*                     Imaging  EC-ECHOCARDIOGRAM COMPLETE W/O CONT   Final Result      EC-ECHOCARDIOGRAM COMPLETE W/ CONT         DX-CHEST-LIMITED (1 VIEW)   Final Result         1.  Bilateral basilar atelectasis, no focal infiltrate   2.  Cardiomegaly          Assessment/Plan  Acute metabolic encephalopathy  Assessment & Plan  Mental status is markedly improved today  Cont to minimize sedating medications      Hypotension  Assessment & Plan  Thought to be due to vol vs cardiogenic vs less likely septic  In setting of severe aortic stenosis      Shoulder fracture  Assessment  & Plan  PT/OT consulted  Currently has a ropivacaine drip; plan removal tomorrow per his Ortho Dr    Atrial fibrillation (HCC)  Assessment & Plan  On apixaban  Had been on propafenone as an outpt  Metoprolol SR 25mg: was hypotensive yesterday when given 50mg  Got one time dose of 500mcg of IV Dig this am  Still running high with soft pressures: will cont Dig load if still high this afternoon    Parkinson's disease with dyskinesia without fluctuating manifestations (HCC)- (present on admission)  Assessment & Plan  Cont home regimen of   -sinemet  -rotiglotine

## 2025-04-19 LAB
ALBUMIN SERPL BCP-MCNC: 2.9 G/DL (ref 3.2–4.9)
ALBUMIN/GLOB SERPL: 1 G/DL
ALP SERPL-CCNC: 37 U/L (ref 30–99)
ALT SERPL-CCNC: 7 U/L (ref 2–50)
ANION GAP SERPL CALC-SCNC: 11 MMOL/L (ref 7–16)
APPEARANCE UR: CLEAR
AST SERPL-CCNC: 21 U/L (ref 12–45)
BACTERIA #/AREA URNS HPF: ABNORMAL /HPF
BILIRUB SERPL-MCNC: 1.2 MG/DL (ref 0.1–1.5)
BILIRUB UR QL STRIP.AUTO: ABNORMAL
BUN SERPL-MCNC: 20 MG/DL (ref 8–22)
CALCIUM ALBUM COR SERPL-MCNC: 8.7 MG/DL (ref 8.5–10.5)
CALCIUM SERPL-MCNC: 7.8 MG/DL (ref 8.5–10.5)
CASTS URNS QL MICRO: ABNORMAL /LPF (ref 0–2)
CHLORIDE SERPL-SCNC: 108 MMOL/L (ref 96–112)
CO2 SERPL-SCNC: 20 MMOL/L (ref 20–33)
COLOR UR: ABNORMAL
CREAT SERPL-MCNC: 0.68 MG/DL (ref 0.5–1.4)
EPITHELIAL CELLS 1715: ABNORMAL /HPF (ref 0–5)
ERYTHROCYTE [DISTWIDTH] IN BLOOD BY AUTOMATED COUNT: 48.7 FL (ref 35.9–50)
GFR SERPLBLD CREATININE-BSD FMLA CKD-EPI: 95 ML/MIN/1.73 M 2
GLOBULIN SER CALC-MCNC: 2.8 G/DL (ref 1.9–3.5)
GLUCOSE SERPL-MCNC: 108 MG/DL (ref 65–99)
GLUCOSE UR STRIP.AUTO-MCNC: NEGATIVE MG/DL
HCT VFR BLD AUTO: 32.5 % (ref 42–52)
HGB BLD-MCNC: 10.5 G/DL (ref 14–18)
KETONES UR STRIP.AUTO-MCNC: 40 MG/DL
LEUKOCYTE ESTERASE UR QL STRIP.AUTO: NEGATIVE
MAGNESIUM SERPL-MCNC: 2.1 MG/DL (ref 1.5–2.5)
MCH RBC QN AUTO: 30 PG (ref 27–33)
MCHC RBC AUTO-ENTMCNC: 32.3 G/DL (ref 32.3–36.5)
MCV RBC AUTO: 92.9 FL (ref 81.4–97.8)
MICRO URNS: ABNORMAL
NITRITE UR QL STRIP.AUTO: NEGATIVE
PH UR STRIP.AUTO: 6 [PH] (ref 5–8)
PHOSPHATE SERPL-MCNC: 2.4 MG/DL (ref 2.5–4.5)
PLATELET # BLD AUTO: 223 K/UL (ref 164–446)
PMV BLD AUTO: 9.1 FL (ref 9–12.9)
POTASSIUM SERPL-SCNC: 4.1 MMOL/L (ref 3.6–5.5)
PROT SERPL-MCNC: 5.7 G/DL (ref 6–8.2)
PROT UR QL STRIP: 30 MG/DL
RBC # BLD AUTO: 3.5 M/UL (ref 4.7–6.1)
RBC # URNS HPF: ABNORMAL /HPF (ref 0–2)
RBC UR QL AUTO: NEGATIVE
SODIUM SERPL-SCNC: 139 MMOL/L (ref 135–145)
SP GR UR STRIP.AUTO: 1.03
UROBILINOGEN UR STRIP.AUTO-MCNC: 2 EU/DL
WBC # BLD AUTO: 11.9 K/UL (ref 4.8–10.8)
WBC #/AREA URNS HPF: ABNORMAL /HPF

## 2025-04-19 PROCEDURE — 81001 URINALYSIS AUTO W/SCOPE: CPT

## 2025-04-19 PROCEDURE — 83735 ASSAY OF MAGNESIUM: CPT

## 2025-04-19 PROCEDURE — A9270 NON-COVERED ITEM OR SERVICE: HCPCS | Performed by: INTERNAL MEDICINE

## 2025-04-19 PROCEDURE — 700102 HCHG RX REV CODE 250 W/ 637 OVERRIDE(OP)

## 2025-04-19 PROCEDURE — 700102 HCHG RX REV CODE 250 W/ 637 OVERRIDE(OP): Performed by: INTERNAL MEDICINE

## 2025-04-19 PROCEDURE — 700102 HCHG RX REV CODE 250 W/ 637 OVERRIDE(OP): Performed by: STUDENT IN AN ORGANIZED HEALTH CARE EDUCATION/TRAINING PROGRAM

## 2025-04-19 PROCEDURE — 700102 HCHG RX REV CODE 250 W/ 637 OVERRIDE(OP): Performed by: HOSPITALIST

## 2025-04-19 PROCEDURE — 99233 SBSQ HOSP IP/OBS HIGH 50: CPT | Performed by: HOSPITALIST

## 2025-04-19 PROCEDURE — A9270 NON-COVERED ITEM OR SERVICE: HCPCS | Performed by: HOSPITALIST

## 2025-04-19 PROCEDURE — 84100 ASSAY OF PHOSPHORUS: CPT

## 2025-04-19 PROCEDURE — 97535 SELF CARE MNGMENT TRAINING: CPT

## 2025-04-19 PROCEDURE — A9270 NON-COVERED ITEM OR SERVICE: HCPCS | Performed by: STUDENT IN AN ORGANIZED HEALTH CARE EDUCATION/TRAINING PROGRAM

## 2025-04-19 PROCEDURE — 770020 HCHG ROOM/CARE - TELE (206)

## 2025-04-19 PROCEDURE — 80053 COMPREHEN METABOLIC PANEL: CPT

## 2025-04-19 PROCEDURE — 97163 PT EVAL HIGH COMPLEX 45 MIN: CPT

## 2025-04-19 PROCEDURE — 700111 HCHG RX REV CODE 636 W/ 250 OVERRIDE (IP): Mod: JZ | Performed by: HOSPITALIST

## 2025-04-19 PROCEDURE — 85027 COMPLETE CBC AUTOMATED: CPT

## 2025-04-19 RX ORDER — DIGOXIN 125 MCG
125 TABLET ORAL DAILY
Status: DISCONTINUED | OUTPATIENT
Start: 2025-04-19 | End: 2025-04-23 | Stop reason: HOSPADM

## 2025-04-19 RX ORDER — DILTIAZEM HYDROCHLORIDE 5 MG/ML
0.25 INJECTION INTRAVENOUS ONCE
Status: ACTIVE | OUTPATIENT
Start: 2025-04-19 | End: 2025-04-20

## 2025-04-19 RX ORDER — ACETAMINOPHEN 500 MG
1000 TABLET ORAL EVERY 6 HOURS
Status: DISCONTINUED | OUTPATIENT
Start: 2025-04-19 | End: 2025-04-23 | Stop reason: HOSPADM

## 2025-04-19 RX ORDER — ONDANSETRON 2 MG/ML
4 INJECTION INTRAMUSCULAR; INTRAVENOUS EVERY 4 HOURS PRN
Status: DISCONTINUED | OUTPATIENT
Start: 2025-04-19 | End: 2025-04-23 | Stop reason: HOSPADM

## 2025-04-19 RX ORDER — DILTIAZEM HYDROCHLORIDE 5 MG/ML
20 INJECTION INTRAVENOUS
Status: DISCONTINUED | OUTPATIENT
Start: 2025-04-19 | End: 2025-04-20

## 2025-04-19 RX ADMIN — APIXABAN 5 MG: 5 TABLET, FILM COATED ORAL at 05:16

## 2025-04-19 RX ADMIN — ACETAMINOPHEN 1000 MG: 500 TABLET, FILM COATED ORAL at 21:08

## 2025-04-19 RX ADMIN — DIBASIC SODIUM PHOSPHATE, MONOBASIC POTASSIUM PHOSPHATE AND MONOBASIC SODIUM PHOSPHATE 500 MG: 852; 155; 130 TABLET ORAL at 12:46

## 2025-04-19 RX ADMIN — DILTIAZEM HYDROCHLORIDE 20 MG: 5 INJECTION, SOLUTION INTRAVENOUS at 07:27

## 2025-04-19 RX ADMIN — ROTIGOTINE 8 MG: 8 PATCH, EXTENDED RELEASE TRANSDERMAL at 05:17

## 2025-04-19 RX ADMIN — CARBIDOPA AND LEVODOPA 2 TABLET: 25; 100 TABLET ORAL at 05:17

## 2025-04-19 RX ADMIN — METOPROLOL SUCCINATE 50 MG: 25 TABLET, EXTENDED RELEASE ORAL at 05:16

## 2025-04-19 RX ADMIN — DIBASIC SODIUM PHOSPHATE, MONOBASIC POTASSIUM PHOSPHATE AND MONOBASIC SODIUM PHOSPHATE 500 MG: 852; 155; 130 TABLET ORAL at 16:51

## 2025-04-19 RX ADMIN — ACETAMINOPHEN 1000 MG: 500 TABLET ORAL at 09:50

## 2025-04-19 RX ADMIN — CARBIDOPA AND LEVODOPA 2 TABLET: 25; 100 TABLET ORAL at 12:45

## 2025-04-19 RX ADMIN — PRAVASTATIN SODIUM 40 MG: 20 TABLET ORAL at 21:07

## 2025-04-19 RX ADMIN — DIBASIC SODIUM PHOSPHATE, MONOBASIC POTASSIUM PHOSPHATE AND MONOBASIC SODIUM PHOSPHATE 500 MG: 852; 155; 130 TABLET ORAL at 21:08

## 2025-04-19 RX ADMIN — DIGOXIN 125 MCG: 0.12 TABLET ORAL at 12:45

## 2025-04-19 RX ADMIN — ACETAMINOPHEN 1000 MG: 500 TABLET, FILM COATED ORAL at 16:51

## 2025-04-19 RX ADMIN — ASPIRIN 81 MG: 81 TABLET, COATED ORAL at 05:16

## 2025-04-19 RX ADMIN — DIBASIC SODIUM PHOSPHATE, MONOBASIC POTASSIUM PHOSPHATE AND MONOBASIC SODIUM PHOSPHATE 500 MG: 852; 155; 130 TABLET ORAL at 09:50

## 2025-04-19 RX ADMIN — APIXABAN 5 MG: 5 TABLET, FILM COATED ORAL at 16:51

## 2025-04-19 RX ADMIN — RASAGILINE 0.5 MG: 1 TABLET ORAL at 05:17

## 2025-04-19 RX ADMIN — CARBIDOPA AND LEVODOPA 2 TABLET: 25; 100 TABLET ORAL at 16:51

## 2025-04-19 ASSESSMENT — GAIT ASSESSMENTS
ASSISTIVE DEVICE: SINGLE POINT CANE
DEVIATION: SHUFFLED GAIT
GAIT LEVEL OF ASSIST: MINIMAL ASSIST
DISTANCE (FEET): 100

## 2025-04-19 ASSESSMENT — PAIN DESCRIPTION - PAIN TYPE
TYPE: ACUTE PAIN

## 2025-04-19 ASSESSMENT — COGNITIVE AND FUNCTIONAL STATUS - GENERAL
DRESSING REGULAR UPPER BODY CLOTHING: A LOT
CLIMB 3 TO 5 STEPS WITH RAILING: A LOT
MOVING FROM LYING ON BACK TO SITTING ON SIDE OF FLAT BED: A LITTLE
MOVING TO AND FROM BED TO CHAIR: A LITTLE
TURNING FROM BACK TO SIDE WHILE IN FLAT BAD: A LOT
DRESSING REGULAR LOWER BODY CLOTHING: A LITTLE
WALKING IN HOSPITAL ROOM: A LITTLE
HELP NEEDED FOR BATHING: A LITTLE
EATING MEALS: A LITTLE
STANDING UP FROM CHAIR USING ARMS: A LOT
STANDING UP FROM CHAIR USING ARMS: A LOT
TURNING FROM BACK TO SIDE WHILE IN FLAT BAD: A LITTLE
PERSONAL GROOMING: A LITTLE
CLIMB 3 TO 5 STEPS WITH RAILING: A LOT
MOVING TO AND FROM BED TO CHAIR: A LOT
WALKING IN HOSPITAL ROOM: A LITTLE
MOBILITY SCORE: 15
MOVING FROM LYING ON BACK TO SITTING ON SIDE OF FLAT BED: A LOT
DAILY ACTIVITIY SCORE: 17
SUGGESTED CMS G CODE MODIFIER MOBILITY: CL
MOBILITY SCORE: 14
SUGGESTED CMS G CODE MODIFIER MOBILITY: CK
SUGGESTED CMS G CODE MODIFIER DAILY ACTIVITY: CK
TOILETING: A LITTLE

## 2025-04-19 ASSESSMENT — ENCOUNTER SYMPTOMS
CHILLS: 0
PALPITATIONS: 0
FEVER: 0
SHORTNESS OF BREATH: 0
DIZZINESS: 0
NAUSEA: 0
VOMITING: 0
COUGH: 0
BACK PAIN: 0
DIARRHEA: 0
ABDOMINAL PAIN: 0
HEADACHES: 0
LOSS OF CONSCIOUSNESS: 0

## 2025-04-19 ASSESSMENT — FIBROSIS 4 INDEX: FIB4 SCORE: 2.78

## 2025-04-19 NOTE — PROGRESS NOTES
Hospital Medicine Daily Progress Note    Date of Service  4/19/2025    Chief Complaint  Osman Choi is a 80 y.o. male admitted 4/16/2025 with hypotension    Hospital Course  80 y.o. male who presented 4/16/2025 with Parkinson's, mod/severe AS, AFib on apixaban, ETOH use with a recent shoulder Fx s/p ORIF 4/13-14.  Pt had been recovering at [facility] when he developed hypotension and was sent to us for evaluation given his hewly Dx'd severe aortic stenosis.  Here pt was admitted to the ICU and given fluid resuscitation as well as Digoxin IV load..  He has had significant issues with agitation and delerium requiring precedex drip     Interval Problem Update  Pt O though mildly confused.  Anxious to get out of bed but no other complaints    Dw family at the bedside and on the phone  DW Cardiology    I have discussed this patient's plan of care and discharge plan at IDT rounds today with Case Management, Nursing, Nursing leadership, and other members of the IDT team.    Consultants/Specialty  cardiology    Code Status  Full Code    Disposition  The patient is not medically cleared for discharge to home or a post-acute facility.      I have placed the appropriate orders for post-discharge needs.    Review of Systems  Review of Systems   Constitutional:  Negative for chills and fever.   Respiratory:  Negative for cough and shortness of breath.    Cardiovascular:  Negative for chest pain, palpitations and leg swelling.   Gastrointestinal:  Negative for abdominal pain, diarrhea, nausea and vomiting.   Genitourinary:  Negative for dysuria and urgency.   Musculoskeletal:  Negative for back pain.        L shoulder pain   Skin:  Negative for rash.   Neurological:  Negative for dizziness, loss of consciousness and headaches.        Physical Exam  Temp:  [36.4 °C (97.5 °F)-37.2 °C (98.9 °F)] 36.4 °C (97.5 °F)  Pulse:  [] 111  Resp:  [15-40] 32  BP: ()/(53-84) 144/79  SpO2:  [90 %-98 %] 92 %    Physical  Exam  Constitutional:       General: He is not in acute distress.     Appearance: He is well-developed. He is not diaphoretic.   HENT:      Head: Normocephalic and atraumatic.   Eyes:      Conjunctiva/sclera: Conjunctivae normal.   Neck:      Vascular: No JVD.   Cardiovascular:      Rate and Rhythm: Normal rate. Rhythm irregular.      Heart sounds: No murmur heard.     No gallop.   Pulmonary:      Effort: Pulmonary effort is normal. No respiratory distress.      Breath sounds: No stridor. No wheezing or rales.   Abdominal:      Palpations: Abdomen is soft.      Tenderness: There is no abdominal tenderness. There is no guarding or rebound.   Musculoskeletal:      Right lower leg: No edema.      Left lower leg: No edema.      Comments: L arm in a sling with post op dressings in place   Skin:     General: Skin is warm and dry.      Capillary Refill: Capillary refill takes less than 2 seconds.      Findings: No rash.   Neurological:      Mental Status: He is oriented to person, place, and time.   Psychiatric:         Mood and Affect: Mood normal.         Behavior: Behavior normal.         Thought Content: Thought content normal.         Fluids    Intake/Output Summary (Last 24 hours) at 4/19/2025 0655  Last data filed at 4/19/2025 0516  Gross per 24 hour   Intake 638.93 ml   Output 850 ml   Net -211.07 ml        Laboratory  Recent Labs     04/17/25  0320 04/18/25  0311 04/19/25  0445   WBC 10.3 12.8* 11.9*   RBC 3.52* 3.57* 3.50*   HEMOGLOBIN 10.4* 10.8* 10.5*   HEMATOCRIT 32.4* 33.0* 32.5*   MCV 92.0 92.4 92.9   MCH 29.5 30.3 30.0   MCHC 32.1* 32.7 32.3   RDW 47.0 48.5 48.7   PLATELETCT 171 208 223   MPV 9.5 9.7 9.1     Recent Labs     04/17/25  0320 04/18/25  0311 04/19/25  0445   SODIUM 137 136 139   POTASSIUM 4.6 4.2 4.1   CHLORIDE 106 105 108   CO2 22 19* 20   GLUCOSE 115* 94 108*   BUN 10 17 20   CREATININE 0.53 0.66 0.68   CALCIUM 8.0* 7.8* 7.8*                   Imaging  EC-ECHOCARDIOGRAM COMPLETE W/O CONT    Final Result      EC-ECHOCARDIOGRAM COMPLETE W/ CONT         DX-CHEST-LIMITED (1 VIEW)   Final Result         1.  Bilateral basilar atelectasis, no focal infiltrate   2.  Cardiomegaly           Assessment/Plan  * Nonrheumatic aortic valve stenosis  Assessment & Plan  Severe based on TTE:  Has been referred for outpt evaluation for TAVR    Acute metabolic encephalopathy  Assessment & Plan  Mental status is markedly improved today  Cont to minimize sedating medications  Start scheduled APAP and qhs melatonin      Hypotension  Assessment & Plan  Thought to be due to vol vs cardiogenic vs less likely septic  In setting of severe aortic stenosis      Shoulder fracture  Assessment & Plan  PT/OT consulted  Currently has a ropivacaine drip; remove today as post op day 4    Atrial fibrillation (HCC)  Assessment & Plan  On apixaban  Had been on propafenone as an outpt: structural cardiac team recommend stopping  Based on underlying structural heart disease Cards feel unlikely to do well with amio  Has had hypotension with metoprolol  Now s/p IV Digoxin load: cont 125mcg daily  Unclear how much of his tachycardic events are driven by external issues (agitation/anxiety)    Parkinson's disease with dyskinesia without fluctuating manifestations (HCC)- (present on admission)  Assessment & Plan  Cont home regimen of   -sinemet  -rotiglotine         VTE prophylaxis: VTE Selection    I have performed a physical exam and reviewed and updated ROS and Plan today (4/19/2025). In review of yesterday's note (4/18/2025), there are no changes except as documented above.

## 2025-04-19 NOTE — CARE PLAN
The patient is Stable - Low risk of patient condition declining or worsening    Shift Goals  Clinical Goals: safety  Patient Goals: go to rehab  Family Goals: pt to sleep, support pt    Progress made toward(s) clinical / shift goals:  Patient at risk for falls, bed alarm on, walker out of sight, nonskid socks on, call light within reach, personal belongings within reach, toileting offered.       Patient is not progressing towards the following goals:

## 2025-04-19 NOTE — ASSESSMENT & PLAN NOTE
4/22/2025  Severe based on TTE:  Has been referred for outpt evaluation for TAVR    Follow-up with cardiology as outpatient for TAVR procedure

## 2025-04-19 NOTE — THERAPY
Physical Therapy   Initial Evaluation     Patient Name: Osman Azul Choi  Age:  80 y.o., Sex:  male  Medical Record #: [id number]  Today's Date: 4/19/2025     Precautions  Precautions: Fall Risk;Non Weight Bearing Left Upper Extremity;Sling Left Upper Extremity  Comments: no orders in chart, assume NWB L UE and sling L UE, (Buddy Baltazar (Orthopedics at [facility], if there is any question for post-op rehab)    Assessment  Patient is 80 y.o. male with a diagnosis of Severe aortic stenosis, GLF and L TSA 4/14/25. .  Additional factors influencing patient status / progress : today, pt is met in bed, confused, but very motivated to get up to move. Pt needs max assist to come to EOB, min A with transfers and ambulation using SPC. Pt is confused and struggling to follow but improves greatly with activity. See details below, PT to follow. .      Plan    Physical Therapy Initial Treatment Plan   Treatment Plan : Bed Mobility, Gait Training, Neuro Re-Education / Balance, Stair Training, Therapeutic Activities, Therapeutic Exercise, Self Care / Home Evaluation  Treatment Frequency: 3 Times per Week  Duration: Until Therapy Goals Met    DC Equipment Recommendations: Unable to determine at this time  Discharge Recommendations: Recommend post-acute placement for additional physical therapy services prior to discharge home            Objective       04/19/25 0906   Initial Contact Note    Initial Contact Note Order Received and Verified, Physical Therapy Evaluation in Progress with Full Report to Follow.   Precautions   Precautions Fall Risk;Non Weight Bearing Left Upper Extremity;Sling Left Upper Extremity   Comments no orders in chart, assume NWB L UE and sling L UE, (Buddy Baltazar (Orthopedics at [facility], if there is any question for post-op rehab)   Vitals   Blood Pressure    (low BP up in chair after therapy, nsg in to discuss with pt's wife re prior poor tolerance of some meds.)   O2 Delivery Device  None - Room Air   Pain 0 - 10 Group   Therapist Pain Assessment During Activity;Nurse Notified  (L shoulder painful with activity, not rated)   Prior Living Situation   Prior Services None   Housing / Facility 2 Story House   Steps Into Home 14   Steps In Home 0   Equipment Owned Front-Wheel Walker;Single Point Cane   Lives with - Patient's Self Care Capacity Spouse  (Navya, home and very supportive.)   Comments 14 stairs to enter, then pt spends most of his time on the main floor.   Prior Level of Functional Mobility   Bed Mobility Independent   Transfer Status Independent   Ambulation Independent   Ambulation Distance community distances, goes to gym with wife.   Assistive Devices Used None   Stairs Required Assist  (wife is with him when on stairs.)   Cognition    Cognition / Consciousness X   Comments confused at first, struggling with directional cues, improves with activity.   Active ROM Lower Body    Active ROM Lower Body  WDL   Strength Lower Body   Lower Body Strength  X   Comments functional for transfers, ambulation, but shows shuffling gait at times.   Balance Assessment   Sitting Balance (Static) Poor  (begins to lean back after sitting for a few minutes.)   Sitting Balance (Dynamic) Poor   Standing Balance (Static) Poor +   Standing Balance (Dynamic) Poor +   Weight Shift Sitting Fair   Weight Shift Standing Fair   Comments with SPC   Bed Mobility    Supine to Sit Maximal Assist   Scooting Maximal Assist  (seated)   Rolling   (pivoted with HOB up)   Gait Analysis   Gait Level Of Assist Minimal Assist   Assistive Device Single Point Cane   Distance (Feet) 100   # of Times Distance was Traveled 1   Deviation Shuffled Gait  (flexed posture. Responds well to cues to increase stride length, lift feet to clear toes.)   Weight Bearing Status Assume NWB L UE, sling L UE   Functional Mobility   Sit to Stand Minimal Assist   Bed, Chair, Wheelchair Transfer Minimal Assist   Transfer Method Stand Step   6 Clicks  Assessment - How much HELP from from another person do you currently need... (If the patient hasn't done an activity recently, how much help from another person do you think he/she would need if he/she tried?)   Turning from your back to your side while in a flat bed without using bedrails? 2   Moving from lying on your back to sitting on the side of a flat bed without using bedrails? 2   Moving to and from a bed to a chair (including a wheelchair)? 3   Standing up from a chair using your arms (e.g., wheelchair, or bedside chair)? 2   Walking in hospital room? 3   Climbing 3-5 steps with a railing? 2   6 clicks Mobility Score 14   Activity Tolerance   Sitting in Chair 20+   Standing 10   Edema / Skin Assessment   Edema / Skin  X   Comments L UE edema   Short Term Goals    Short Term Goal # 1 Pt will perform bed mobility with HOB and rail as needed with supervision in 6 visit.s   Short Term Goal # 2 Pt will perform all transfers with cg assist in 6 visits to improve functional indep.   Short Term Goal # 3 Pt will ambulate x 200 feet using SPC with cg assist in 6 visits to improve functional indep.   Short Term Goal # 4 Pt will negotiate 3 stairs with min A in 6 visits to work towards full flight to access home.   Education Group   Education Provided Role of Physical Therapist   Role of Physical Therapist Patient Response Patient;Family;Acceptance;Explanation;Verbal Demonstration   Brace Wear & Care Patient Response Patient;Family;Acceptance;Explanation;Demonstration;Action Demonstration   Additional Comments sling adjusted to support L shoulder, wife present and helpful. Ed done re gait training, pt following well. Ed done with pt and wife re progression of therapy and dc planning. Family asking for case mgmt. case mgmt updated.   Physical Therapy Initial Treatment Plan    Treatment Plan  Bed Mobility;Gait Training;Neuro Re-Education / Balance;Stair Training;Therapeutic Activities;Therapeutic Exercise;Self Care /  Home Evaluation   Treatment Frequency 3 Times per Week   Duration Until Therapy Goals Met   Problem List    Problems Pain;Impaired Bed Mobility;Impaired Transfers;Impaired Ambulation;Impaired Balance;Decreased Activity Tolerance;Safety Awareness Deficits / Cognition;Limited Knowledge of Post-Op Precautions   Anticipated Discharge Equipment and Recommendations   DC Equipment Recommendations Unable to determine at this time   Discharge Recommendations Recommend post-acute placement for additional physical therapy services prior to discharge home   Interdisciplinary Plan of Care Collaboration   IDT Collaboration with  Nursing   Patient Position at End of Therapy Seated;Chair Alarm On;Call Light within Reach;Tray Table within Reach;Phone within Reach;Family / Friend in Room   Collaboration Comments nsg updated   Session Information   Date / Session Number  4/19-1 (1/3, 4/25)

## 2025-04-19 NOTE — CARE PLAN
"The patient is Watcher - Medium risk of patient condition declining or worsening    Shift Goals  Clinical Goals: Patient and family will verbalize understanding of POC. Patiuent will be up to chair for meals. Patient's pain will be well controlled throughout shift. Patient will remain free from skin breakdown. Patient will remain free from fround level falls.  Patient Goals: \"Get walking.\"  Family Goals: \"Information and updates on discharge planning.\"    Progress made toward(s) clinical / shift goals:    Problem: Knowledge Deficit - Standard  Goal: Patient and family/care givers will demonstrate understanding of plan of care, disease process/condition, diagnostic tests and medications  Description: Target End Date:  1-3 days or as soon as patient condition allowsDocument in Patient Education1.  Patient and family/caregiver oriented to unit, equipment, visitation policy and means for communicating concern2.  Complete/review Learning Assessment3.  Assess knowledge level of disease process/condition, treatment plan, diagnostic tests and medications4.  Explain disease process/condition, treatment plan, diagnostic tests and medications  Outcome: Progressing     Problem: Pain - Standard  Goal: Alleviation of pain or a reduction in pain to the patient’s comfort goal  Description: Target End Date:  Prior to discharge or change in level of careDocument on Vitals flowsheet1.  Document pain using the appropriate pain scale per order or unit policy2.  Educate and implement non-pharmacologic comfort measures (i.e. relaxation, distraction, massage, cold/heat therapy, etc.)3.  Pain management medications as ordered4.  Reassess pain after pain med administration per policy5.  If opiods administered assess patient's response to pain medication is appropriate per POSS sedation scale6.  Follow pain management plan developed in collaboration with patient and interdisciplinary team (including palliative care or pain specialists if " applicable)  Outcome: Progressing     Problem: Fall Risk  Goal: Patient will remain free from falls  Description: Target End Date:  Prior to discharge or change in level of careDocument interventions on the Acharya Silverio Fall Risk Assessment1.  Assess for fall risk factors2.  Implement fall precautions  Outcome: Progressing     Problem: Skin Integrity  Goal: Skin integrity is maintained or improved  Description: Target End Date:  Prior to discharge or change in level of careDocument interventions on Skin Risk/Micah flowsheet groups and corresponding LDA1.  Assess and monitor skin integrity, appearance and/or temperature2.  Assess risk factors for impaired skin integrity and/or pressures ulcers3.  Implement precautions to protect skin integrity in collaboration with interdisciplinary team4.  Implement pressure ulcer prevention protocol if at risk for skin breakdown5.  Confirm wound care consult if at risk for skin breakdown6.  Ensure patient use of pressure relieving devices  (Low air loss bed, waffle overlay, heel protectors, ROHO cushion, etc)  Outcome: Progressing     Problem: Hemodynamics  Goal: Patient's hemodynamics, fluid balance and neurologic status will be stable or improve  Description: Target End Date:  Prior to discharge or change in level of careDocument on Assessment and I/O flowsheet templates1.  Monitor vital signs, pulse oximetry and cardiac monitor per provider order and/or policy2.  Maintain blood pressure per provider order3.  Hemodynamic monitoring per provider order4.  Manage IV fluids and IV infusions5.  Monitor intake and output6.  Daily weights per unit policy or provider order7.  Assess peripheral pulses and capillary refill8.  Assess color and body temperature9.  Position patient for maximum circulation/cardiac gumbxo64. Monitor for signs/symptoms of excessive kxdatoht67. Assess mental status, restlessness and changes in level of ivsrixonthxrd97. Monitor temperature and report fever or  hypothermia to provider immediately. Consideration of targeted temperature management.  Outcome: Progressing     Problem: Respiratory  Goal: Patient will achieve/maintain optimum respiratory ventilation and gas exchange  Description: Target End Date:  Prior to discharge or change in level of careDocument on Assessment flowsheet1.  Assess and monitor rate, rhythm, depth and effort of respiration2.  Breath sounds assessed qshift and/or as needed3.  Assess O2 saturation, administer/titrate oxygen as ordered4.  Position patient for maximum ventilatory efficiency5.  Turn, cough, and deep breath with splinting to improve effectiveness6.  Collaborate with RT to administer medication/treatments per order7.  Encourage use of incentive spirometer and encourage patient to cough after use and utilize splinting techniques if applicable8.  Airway suctioning9.  Monitor sputum production for changes in color, consistency and ximwnqeuo53. Perform frequent oral etyaziu39. Alternate physical activity with rest periods  Outcome: Progressing     Problem: Risk for Aspiration  Goal: Patient's risk for aspiration will be absent or decrease  Description: Target End Date:  Prior to discharge or change in level of care1.   Complete dysphagia screening on admission2.   NPO until dysphagia screening complete or medically cleared3.   Collaborate with Speech Therapy, Clinical Dietitian and interdisciplinary team4.   Implement aspiration precautions5.   Assist patient up to chair for meals6.   Elevate head of bed 90 degrees if patient is unable to get out of bed7.   Encourage small bites8.   Ensure foods/liquids are of appropriate consistency9.   Assess for any signs/symptoms of maqwuoelgo19. Assess breath sounds and vital signs after oral intake  Outcome: Progressing     Problem: Urinary Elimination  Goal: Establish and maintain regular urinary output  Description: Target End Date:  Prior to discharge or change in level of careDocument on I/O and  Assessment flowsheets1.  Evaluate need to continue indwelling catheter every shift2.  Assess signs and symptoms of urinary retention3.  Assess post-void residual volumes4.  Implement bladder training program5.  Encourage scheduled voidings6.  Assist patient to sit on bedside commode or toilet for voiding7.  Educate patient and family/caregiver on use and purpose of urine collection devices (document in Patient Education)  Outcome: Progressing

## 2025-04-19 NOTE — PROGRESS NOTES
Brief Cardiology Update    Previously signed off, but was asked for alternative medical therapy due to hypotension with metoprolol.     Previously tolerated digoxin load. Would continue digoxin maintenance dose and check serum level prior to discharge.    Structural heart referral placed. Structural heart team to contact patient to schedule evaluation post discharge.

## 2025-04-19 NOTE — THERAPY
04/19/25 1509   Interdisciplinary Plan of Care Collaboration   Collaboration Comments OT consult received. Pt hypotensive (70s/40s per RN). Will hold and follow for appropriate timing of eval.

## 2025-04-20 LAB
ANION GAP SERPL CALC-SCNC: 11 MMOL/L (ref 7–16)
BUN SERPL-MCNC: 18 MG/DL (ref 8–22)
CALCIUM SERPL-MCNC: 7.9 MG/DL (ref 8.5–10.5)
CHLORIDE SERPL-SCNC: 106 MMOL/L (ref 96–112)
CO2 SERPL-SCNC: 22 MMOL/L (ref 20–33)
CREAT SERPL-MCNC: 0.61 MG/DL (ref 0.5–1.4)
GFR SERPLBLD CREATININE-BSD FMLA CKD-EPI: 98 ML/MIN/1.73 M 2
GLUCOSE SERPL-MCNC: 103 MG/DL (ref 65–99)
MAGNESIUM SERPL-MCNC: 2.2 MG/DL (ref 1.5–2.5)
PHOSPHATE SERPL-MCNC: 3.8 MG/DL (ref 2.5–4.5)
POTASSIUM SERPL-SCNC: 3.8 MMOL/L (ref 3.6–5.5)
SODIUM SERPL-SCNC: 139 MMOL/L (ref 135–145)

## 2025-04-20 PROCEDURE — 80048 BASIC METABOLIC PNL TOTAL CA: CPT

## 2025-04-20 PROCEDURE — 700102 HCHG RX REV CODE 250 W/ 637 OVERRIDE(OP)

## 2025-04-20 PROCEDURE — 700102 HCHG RX REV CODE 250 W/ 637 OVERRIDE(OP): Performed by: STUDENT IN AN ORGANIZED HEALTH CARE EDUCATION/TRAINING PROGRAM

## 2025-04-20 PROCEDURE — 84100 ASSAY OF PHOSPHORUS: CPT

## 2025-04-20 PROCEDURE — A9270 NON-COVERED ITEM OR SERVICE: HCPCS | Performed by: STUDENT IN AN ORGANIZED HEALTH CARE EDUCATION/TRAINING PROGRAM

## 2025-04-20 PROCEDURE — 99233 SBSQ HOSP IP/OBS HIGH 50: CPT | Performed by: HOSPITALIST

## 2025-04-20 PROCEDURE — 83735 ASSAY OF MAGNESIUM: CPT

## 2025-04-20 PROCEDURE — 700102 HCHG RX REV CODE 250 W/ 637 OVERRIDE(OP): Performed by: INTERNAL MEDICINE

## 2025-04-20 PROCEDURE — 36415 COLL VENOUS BLD VENIPUNCTURE: CPT

## 2025-04-20 PROCEDURE — 770020 HCHG ROOM/CARE - TELE (206)

## 2025-04-20 PROCEDURE — A9270 NON-COVERED ITEM OR SERVICE: HCPCS | Performed by: HOSPITALIST

## 2025-04-20 PROCEDURE — 700102 HCHG RX REV CODE 250 W/ 637 OVERRIDE(OP): Performed by: HOSPITALIST

## 2025-04-20 PROCEDURE — A9270 NON-COVERED ITEM OR SERVICE: HCPCS | Performed by: INTERNAL MEDICINE

## 2025-04-20 RX ORDER — DILTIAZEM HYDROCHLORIDE 5 MG/ML
10 INJECTION INTRAVENOUS
Status: DISCONTINUED | OUTPATIENT
Start: 2025-04-20 | End: 2025-04-23 | Stop reason: HOSPADM

## 2025-04-20 RX ADMIN — RASAGILINE 0.5 MG: 1 TABLET ORAL at 04:54

## 2025-04-20 RX ADMIN — ASPIRIN 81 MG: 81 TABLET, COATED ORAL at 04:54

## 2025-04-20 RX ADMIN — ACETAMINOPHEN 1000 MG: 500 TABLET, FILM COATED ORAL at 17:34

## 2025-04-20 RX ADMIN — ACETAMINOPHEN 1000 MG: 500 TABLET, FILM COATED ORAL at 04:54

## 2025-04-20 RX ADMIN — CARBIDOPA AND LEVODOPA 2 TABLET: 25; 100 TABLET ORAL at 17:35

## 2025-04-20 RX ADMIN — ACETAMINOPHEN 1000 MG: 500 TABLET, FILM COATED ORAL at 09:30

## 2025-04-20 RX ADMIN — APIXABAN 5 MG: 5 TABLET, FILM COATED ORAL at 17:35

## 2025-04-20 RX ADMIN — Medication 5 MG: at 21:25

## 2025-04-20 RX ADMIN — CARBIDOPA AND LEVODOPA 2 TABLET: 25; 100 TABLET ORAL at 04:54

## 2025-04-20 RX ADMIN — APIXABAN 5 MG: 5 TABLET, FILM COATED ORAL at 04:54

## 2025-04-20 RX ADMIN — DIGOXIN 125 MCG: 0.12 TABLET ORAL at 17:35

## 2025-04-20 RX ADMIN — ACETAMINOPHEN 1000 MG: 500 TABLET, FILM COATED ORAL at 21:25

## 2025-04-20 RX ADMIN — CARBIDOPA AND LEVODOPA 2 TABLET: 25; 100 TABLET ORAL at 11:43

## 2025-04-20 RX ADMIN — PRAVASTATIN SODIUM 40 MG: 20 TABLET ORAL at 21:25

## 2025-04-20 RX ADMIN — ROTIGOTINE 8 MG: 8 PATCH, EXTENDED RELEASE TRANSDERMAL at 06:35

## 2025-04-20 ASSESSMENT — ENCOUNTER SYMPTOMS
LOSS OF CONSCIOUSNESS: 0
NAUSEA: 0
PALPITATIONS: 0
DIZZINESS: 0
ABDOMINAL PAIN: 0
SHORTNESS OF BREATH: 0
DIARRHEA: 0
COUGH: 0
VOMITING: 0
HEADACHES: 0
BACK PAIN: 0
CHILLS: 0
FEVER: 0

## 2025-04-20 ASSESSMENT — PAIN DESCRIPTION - PAIN TYPE
TYPE: ACUTE PAIN;SURGICAL PAIN
TYPE: ACUTE PAIN
TYPE: ACUTE PAIN

## 2025-04-20 ASSESSMENT — FIBROSIS 4 INDEX: FIB4 SCORE: 2.78

## 2025-04-20 NOTE — PROGRESS NOTES
Bedside report received from off going RN/tech: Theodore ESCALANTE, assumed care of patient.     Fall Risk Score: HIGH RISK  Fall risk interventions in place: Place yellow fall risk ID band on patient, Provide patient/family education based on risk assessment, Educate patient/family to call staff for assistance when getting out of bed, Place fall precaution signage outside patient door, Place patient in room close to nursing station, Utilize bed/chair fall alarm, Notify charge of high risk for huddle, Tele-sitter, and Bed alarm connected correctly  Bed type: Regular (Micah Score less than 17 interventions in place)  Patient on cardiac monitor: Yes  IVF/IV medications: Not Applicable   Oxygen: Room Air  Bedside sitter: Not Applicable   Isolation: Not applicable

## 2025-04-20 NOTE — PROGRESS NOTES
Report received from night RN at 0700. PT seen at bedside and pt care assumed. Pt is A&Ox4, on RA, denies pain. PIV flushed and intact. PT is Afib on telemetry. PT is x1 assist.     Plan of care reviewed with pt, call light, phone, and personal belongings within reach. Bed alarm on, and bed in low locked position. All pt's needs met at this time.    Bedside report received from off going RN/tech: AVA Menezes, assumed care of patient.     Fall Risk Score: HIGH RISK  Fall risk interventions in place: Place yellow fall risk ID band on patient, Provide patient/family education based on risk assessment, Educate patient/family to call staff for assistance when getting out of bed, Place fall precaution signage outside patient door, Place patient in room close to nursing station, Utilize bed/chair fall alarm, Notify charge of high risk for huddle, and Bed alarm connected correctly  Bed type: Regular (Micah Score less than 17 interventions in place)  Patient on cardiac monitor: Yes  IVF/IV medications: Not Applicable   Oxygen: Room Air and No oxygen tank in room  Bedside sitter: Not Applicable   Isolation: Not applicable

## 2025-04-20 NOTE — DISCHARGE PLANNING
Care Transition Team Discharge Planning    Anticipated Discharge Information  Discharge Disposition: D/T to SNF with Medicare cert in anticipation of skilled care (03)    Discharge Plan:  PT recommending post acute placement. OT remains pending. Requested DPA to send local SNF referrals. PASRR completed per protocol.    PASRR: [id number]

## 2025-04-20 NOTE — PROGRESS NOTES
4 Eyes Skin Assessment Completed by Theodore Aguirre, RN and Dariela Causey, ACT-A.    Head WDL  Ears WDL  Nose WDL  Mouth WDL  Neck Incision  Breast/Chest WDL  Shoulder Blades Surgical incision left shoulder  Spine WDL  (R) Arm/Elbow/Hand WDL  (L) Arm/Elbow/Hand WDL  Abdomen WDL  Groin WDL  Scrotum/Coccyx/Buttocks WDL  (R) Leg WDL  (L) Leg WDL  (R) Heel/Foot/Toe WDL  (L) Heel/Foot/Toe WDL          Devices In Places Tele Box      Interventions In Place Pillows    Possible Skin Injury No    Pictures Uploaded Into Epic N/A  Wound Consult Placed N/A  RN Wound Prevention Protocol Ordered No

## 2025-04-20 NOTE — DISCHARGE PLANNING
Care Transition Team Assessment  Patient is a 80 year-old male admitted for hypotension. Please see pt's H&P for prior medical history. RNCM met with pt at bedside to complete assessment. Pt A&Ox4 and able to verify the information on the face sheet. Pt lives with spouse in a 2-story. Emergency contact is spouse Navya Whiteside [phone]. Prior to admission patient  reports he has a FWW and cane (does not use), denies home O2 use, does not drive but his spouse does. Pt receives monthly SSI deposits. The patient's PCP is Dr. Anderson. Patient's preferred pharmacy is [facility]. Patient reports a history of SNF/IP[facility] ([facility] [place]) and HHC ([facility]) use in the past. Pt denies any SA or MH concerns, denies drug use, reports 1 alcoholic beverage per day with dinner. Patients confirmed medical coverage with Medicare and [employer].  RNCM discussed discharge planning. Patient reported pt's goal is to go to [facility] SNF swing bed. Per last CM note, referral has been faxed to [facility] swing bed    Information Source  Orientation Level: Oriented X4  Information Given By: Patient, Spouse  Who is responsible for making decisions for patient? : Patient    Readmission Evaluation  Is this a readmission?: No    Elopement Risk  Legal Hold: No  Ambulatory or Self Mobile in Wheelchair: Yes  Disoriented: No  Psychiatric Symptoms: None  History of Wandering: No  Elopement this Admit: No  Vocalizing Wanting to Leave: No  Displays Behaviors, Body Language Wanting to Leave: No-Not at Risk for Elopement  Elopement Risk: Not at Risk for Elopement    Interdisciplinary Discharge Planning  Lives with - Patient's Self Care Capacity: Spouse (Navya, home and very supportive.)  Patient or legal guardian wants to designate a caregiver: No  Support Systems: Family Member(s), Spouse / Significant Other  Housing / Facility: 2 Story House  Prior Services: None    Discharge Preparedness  What is your plan after discharge?: Skilled nursing  facility  What are your discharge supports?: Spouse, Child  Prior Functional Level: Ambulatory, Needs Assist with Activities of Daily Living, Independent with Medication Management, Uses Walker, Uses Cane  Difficulity with ADLs: Walking  Difficulty with ADLs Comment: cane/fww  Difficulity with IADLs: Driving  Difficulity with IADL Comments: spouse    Functional Assesment  Prior Functional Level: Ambulatory, Needs Assist with Activities of Daily Living, Independent with Medication Management, Uses Walker, Uses Cane    Finances  Financial Barriers to Discharge: No  Prescription Coverage: Yes    Vision / Hearing Impairment  Vision Impairment : Yes  Right Eye Vision: Impaired, Wears Glasses, Other (Comments) (diplopia BL)  Left Eye Vision: Impaired, Wears Glasses, Other (Comments) (diplopia BL)  Hearing Impairment : No    Advance Directive  Advance Directive?: DPOA for Health Care  Durable Power of  Name and Contact : per patient, spouse Navya Whiteside [phone]    Domestic Abuse  Possible Abuse/Neglect Reported to:: Not Applicable    Psychological Assessment  History of Substance Abuse: None  History of Psychiatric Problems: No  Non-compliant with Treatment: No  Newly Diagnosed Illness: No    Discharge Risks or Barriers  Discharge risks or barriers?: Post-acute placement / services    Anticipated Discharge Information  Discharge Disposition: D/T to SNF with Medicare cert in anticipation of skilled care (03)  Discharge Address: [address] [place] [address] [place] [address]  Discharge Contact Phone Number: [phone]  Case Management Discharge Planning    Admission Date: 4/16/2025  GMLOS: 1.8  ALOS: 4    6-Clicks ADL Score: 17  6-Clicks Mobility Score: 15  PT and/or OT Eval ordered: Yes  Post-acute Referrals Ordered: Yes  Post-acute Choice Obtained: Yes  Has referral(s) been sent to post-acute provider:  Yes    Anticipated Discharge Dispo: Discharge Disposition: D/T to SNF with Medicare cert in anticipation of  skilled care (03)  Discharge Address: [address] [place] [address] [place] [address]  Discharge Contact Phone Number: [phone]    DME Needed: No    Action(s) Taken: DC Assessment Complete (See below), Choice obtained, and Referral(s) sent  1st choice [facility] swing bed, faxed to [facility]    Call placed to [facility]ing [facility] [phone] who reports they are waiting for their admissions director (who gets in tomorrow) to review referral.     Escalations Completed: None    Medically Clear: No    Next Steps: RNCM to continue to follow patient for DCP needs. Update to CN that patient still needs OT    Barriers to Discharge: Medical clearance and Pending Placement

## 2025-04-20 NOTE — CARE PLAN
The patient is Stable - Low risk of patient condition declining or worsening    Shift Goals  Clinical Goals: VSS, monitor BP, pain management  Patient Goals: rest  Family Goals: updates on poc    Progress made toward(s) clinical / shift goals:    Problem: Knowledge Deficit - Standard  Goal: Patient and family/care givers will demonstrate understanding of plan of care, disease process/condition, diagnostic tests and medications  Description: Target End Date:  1-3 days or as soon as patient condition allowsDocument in Patient Education1.  Patient and family/caregiver oriented to unit, equipment, visitation policy and means for communicating concern2.  Complete/review Learning Assessment3.  Assess knowledge level of disease process/condition, treatment plan, diagnostic tests and medications4.  Explain disease process/condition, treatment plan, diagnostic tests and medications  4/20/2025 0413 by Riri Pandya R.N.  Outcome: Progressing  4/20/2025 0412 by RAUL GarciaN.  Outcome: Progressing     Problem: Pain - Standard  Goal: Alleviation of pain or a reduction in pain to the patient’s comfort goal  Description: Target End Date:  Prior to discharge or change in level of careDocument on Vitals flowsheet1.  Document pain using the appropriate pain scale per order or unit policy2.  Educate and implement non-pharmacologic comfort measures (i.e. relaxation, distraction, massage, cold/heat therapy, etc.)3.  Pain management medications as ordered4.  Reassess pain after pain med administration per policy5.  If opiods administered assess patient's response to pain medication is appropriate per POSS sedation scale6.  Follow pain management plan developed in collaboration with patient and interdisciplinary team (including palliative care or pain specialists if applicable)  4/20/2025 0413 by Riri Pandya, R.N.  Outcome: Progressing  4/20/2025 0412 by Riri Pandya RLeslyeN.  Outcome: Progressing     Problem:  Fall Risk  Goal: Patient will remain free from falls  Description: Target End Date:  Prior to discharge or change in level of careDocument interventions on the Acharya Silverio Fall Risk Assessment1.  Assess for fall risk factors2.  Implement fall precautions  4/20/2025 0413 by Riri Pandya, R.N.  Outcome: Progressing  4/20/2025 0412 by Riri Pandya R.N.  Outcome: Progressing     Problem: Skin Integrity  Goal: Skin integrity is maintained or improved  Description: Target End Date:  Prior to discharge or change in level of careDocument interventions on Skin Risk/Micah flowsheet groups and corresponding LDA1.  Assess and monitor skin integrity, appearance and/or temperature2.  Assess risk factors for impaired skin integrity and/or pressures ulcers3.  Implement precautions to protect skin integrity in collaboration with interdisciplinary team4.  Implement pressure ulcer prevention protocol if at risk for skin breakdown5.  Confirm wound care consult if at risk for skin breakdown6.  Ensure patient use of pressure relieving devices  (Low air loss bed, waffle overlay, heel protectors, ROHO cushion, etc)  4/20/2025 0413 by Riri Pandya, R.N.  Outcome: Progressing  4/20/2025 0412 by Riri Pandya, R.N.  Outcome: Progressing     Problem: Psychosocial  Goal: Patient's level of anxiety will decrease  Description: Target End Date:  1-3 days or as soon as patient condition allows1.  Collaborate with patient and family/caregiver to identify triggers and develop strategies to cope with anxiety2.  Implement stimuli reduction, calming techniques3.  Pharmacologic management per provider order4.  Encourage patient/family/care giver participation5.  Collaborate with interdisciplinary team including Psychologist or Behavioral Health Team as needed  4/20/2025 0413 by Riri Pandya, R.N.  Outcome: Progressing  4/20/2025 0412 by Riri Pandya, R.N.  Outcome: Progressing  Goal: Patient's ability to verbalize  feelings about condition will improve  Description: Target End Date:  Prior to discharge or change in level of care1.  Discuss coping with medical condition and its effects2.  Encourage patient participation in care3.  Encourage acknowledgement of body changes and accompanying emotions4.  Perform depression screening  4/20/2025 0413 by Riri Pandya R.N.  Outcome: Progressing  4/20/2025 0412 by Riri Pandya R.N.  Outcome: Progressing  Goal: Patient's ability to re-evaluate and adapt role responsibilities will improve  Description: Target End Date:  Prior to discharge or change in level of care1.  Assess family support2.  Encourage support system participation in care3.  Encouraged verbalization of feelings regarding caregiver responsibilities4.  Discuss changes in role and responsibilities caused by patient's condition  4/20/2025 0413 by Riri Pandya R.N.  Outcome: Progressing  4/20/2025 0412 by Riri Pandya R.N.  Outcome: Progressing  Goal: Patient and family will demonstrate ability to cope with life altering diagnosis and/or procedure  Description: Target End Date:  1-3 days or as soon as patient condition allows1. Expect initial shock and disbelief following diagnosis of cancer and traumatizing procedures (disfiguring surgery, colostomy, amputation).2.  Assess patient and family/caregiver for stage of grief currently being experienced. Explain process as appropriate.3.  Provide open, nonjudgmental environment. Use therapeutic communication skills of Active-Listening, acknowledgment, and so on.4.  Encourage verbalization of thoughts or concerns and accept expressions of sadness, anger, rejection. Acknowledge normality of these feelings5.  Be aware of mood swings, hostility, and other acting-out behavior. Set limits on inappropriate behavior, redirect negative thinking6.  Be aware of debilitating depression. Ask patient direct questions about state of mind.7.  Visit frequently and  provide physical contact as appropriate, or provide frequent phone support as appropriate for setting. Arrange for care provider and support person to stay with patient as needed8.  Reinforce teaching regarding disease process and treatments and provide information as appropriate about dying. Be honest; do not give false hope while providing emotional support9.  Review past life experiences, role changes, and coping skills. Talk about things that interest the patient  4/20/2025 0413 by Riri Pandya R.N.  Outcome: Progressing  4/20/2025 0412 by Riri Pandya R.N.  Outcome: Progressing  Goal: Spiritual and cultural needs incorporated into hospitalization  Description: Target End Date:  End of day 11.  Encourage verbalization of feelings, concerns, expectations and needs2.  Collaborate with Case Management/Social Services3.  Collaborate with Pastoral Care to meet spiritual needs  4/20/2025 0413 by Riri Pandya R.N.  Outcome: Progressing  4/20/2025 0412 by Riri Pandya R.N.  Outcome: Progressing     Problem: Hemodynamics  Goal: Patient's hemodynamics, fluid balance and neurologic status will be stable or improve  Description: Target End Date:  Prior to discharge or change in level of careDocument on Assessment and I/O flowsheet templates1.  Monitor vital signs, pulse oximetry and cardiac monitor per provider order and/or policy2.  Maintain blood pressure per provider order3.  Hemodynamic monitoring per provider order4.  Manage IV fluids and IV infusions5.  Monitor intake and output6.  Daily weights per unit policy or provider order7.  Assess peripheral pulses and capillary refill8.  Assess color and body temperature9.  Position patient for maximum circulation/cardiac [id number]. Monitor for signs/symptoms of excessive [id number]. Assess mental status, restlessness and changes in level of [id number]. Monitor temperature and report fever or hypothermia to provider immediately. Consideration  of targeted temperature management.  4/20/2025 0413 by Riri Pandya RLeslyeN.  Outcome: Progressing  4/20/2025 0412 by Riri Pandya RANGELA.  Outcome: Progressing     Problem: Respiratory  Goal: Patient will achieve/maintain optimum respiratory ventilation and gas exchange  Description: Target End Date:  Prior to discharge or change in level of careDocument on Assessment flowsheet1.  Assess and monitor rate, rhythm, depth and effort of respiration2.  Breath sounds assessed qshift and/or as needed3.  Assess O2 saturation, administer/titrate oxygen as ordered4.  Position patient for maximum ventilatory efficiency5.  Turn, cough, and deep breath with splinting to improve effectiveness6.  Collaborate with RT to administer medication/treatments per order7.  Encourage use of incentive spirometer and encourage patient to cough after use and utilize splinting techniques if applicable8.  Airway suctioning9.  Monitor sputum production for changes in color, consistency and kgypmnemz03. Perform frequent oral llzadwv83. Alternate physical activity with rest periods  Outcome: Progressing     Problem: Risk for Aspiration  Goal: Patient's risk for aspiration will be absent or decrease  Description: Target End Date:  Prior to discharge or change in level of care1.   Complete dysphagia screening on admission2.   NPO until dysphagia screening complete or medically cleared3.   Collaborate with Speech Therapy, Clinical Dietitian and interdisciplinary team4.   Implement aspiration precautions5.   Assist patient up to chair for meals6.   Elevate head of bed 90 degrees if patient is unable to get out of bed7.   Encourage small bites8.   Ensure foods/liquids are of appropriate consistency9.   Assess for any signs/symptoms of [id number]. Assess breath sounds and vital signs after oral intake  4/20/2025 0413 by Riri Pandya, R.N.  Outcome: Progressing  4/20/2025 0412 by Riri Pandya RLeslyeN.  Outcome: Progressing     Problem:  Urinary - Renal Perfusion  Goal: Ability to achieve and maintain adequate renal perfusion and functioning will improve  Description: Target End Date:  Prior to discharge or change in level of careDocument on I/O and Assessment flowsheet1.  Urine output will remain greater than 0.5ml/Kg/HR2.  Monitor amount and/or characteristics of urine per order/policy. Specific gravity per order/policy3.  Assess signs and symptoms of renal dysfunction  4/20/2025 0413 by Riri Pandya RANGELA.  Outcome: Progressing  4/20/2025 0412 by Riri Pandya R.N.  Outcome: Progressing     Problem: Venous Thromboembolism (VTE) Prevention  Goal: The patient will remain free from venous thromboembolism (VTE)  Description: Target End Date:  Prior to discharge or change in level of care1.  VTE prophylaxis assessed and initiated by day 1 or 2 of hospital admission (pharmacologic or mechanical). Contraindication documented by provider.2.  Ensure patient wears mechanical prophylaxis when in bed or chair if ordered3.  Remove mechanical prophylaxis at least once per shift for skin checks4.  Encourage patient to perform ankle flex, foot rotation and knee exercises in addition to other prophylactic measures every hour while awake5.  Encourage ambulation/mobilization at level directed by Physical Therapy in collaboration with interdisciplinary team6.  Administer prophylactic medication per order  4/20/2025 0413 by Riri Pandya, R.N.  Outcome: Progressing  4/20/2025 0412 by Riri Pandya R.N.  Outcome: Progressing     Problem: Nutrition  Goal: Patient's nutritional and fluid intake will be adequate or improve  Description: Target End Date:  Prior to discharge or change in level of careDocument on I/O flowsheet1.  Monitor nutritional intake2.  Monitor weight per provider order3.  Assess patient's ability to take oral nutrition4.  Collaborate with Speech Therapy, Dietitian and interdisciplinary team for appropriate feeding and fluid  intake5.  Assist with feeding  4/20/2025 0413 by Riri Pandya RLeslyeN.  Outcome: Progressing  4/20/2025 0412 by Rrii Pandya RANGELA.  Outcome: Progressing  Goal: Enteral nutrition will be maintained or improve  Description: Target End Date:  Prior to discharge or change in level of care1. Enteral access to be obtained or modified2. Advance to goal rate per protocol3. Collaborate with Clinical Dietitian for signs/symptoms of intolerance4. Weights per provider order5. Consider Speech Therapy consult for swallowing difficulties  Outcome: Progressing  Goal: Enteral nutrition will be maintained or improve  Description: Target End Date:  Prior to discharge or change in level of care1.  Fingerstick glucose every 8 hours2.  Notify provider for fever or elevated glucose3.  TPN tubing and port changes every 24 hours.  Turn TPN through dedicated line. (Document on LDA)4.  TPN dressing changes 24 hours after insertion and then every Saturday  (Document on LDA)5.  Daily weights6.  Monitor TPN lab results7.  Collaborate with Clinical Dietician8.  Collaborate with Pharmacist  Outcome: Progressing     Problem: Urinary Elimination  Goal: Establish and maintain regular urinary output  Description: Target End Date:  Prior to discharge or change in level of careDocument on I/O and Assessment flowsheets1.  Evaluate need to continue indwelling catheter every shift2.  Assess signs and symptoms of urinary retention3.  Assess post-void residual volumes4.  Implement bladder training program5.  Encourage scheduled voidings6.  Assist patient to sit on bedside commode or toilet for voiding7.  Educate patient and family/caregiver on use and purpose of urine collection devices (document in Patient Education)  4/20/2025 0413 by Riri Pandya R.N.  Outcome: Progressing  4/20/2025 0412 by Riri Pandya RLeslyeN.  Outcome: Progressing     Problem: Bowel Elimination  Goal: Establish and maintain regular bowel function  Description:  Target End Date:  Prior to discharge or change in level of care1.   Note date of last BM2.   Educate about diet, fluid intake, medication and activity to promote bowel function3.   Educate signs and symptoms of constipation and interventions to implement4.   Pharmacologic bowel management per provider order5.   Regular toileting schedule6.   Upright position for toileting7.   High fiber diet8.   Encourage hydration9.   Collaborate with Clinical Upesogxml32. Care and maintenance of ostomy if applicable  4/20/2025 0413 by Riri Pandya, R.N.  Outcome: Progressing  4/20/2025 0412 by Riri Pandya, R.BALTA.  Outcome: Progressing

## 2025-04-20 NOTE — PROGRESS NOTES
Hospital Medicine Daily Progress Note    Date of Service  4/20/2025    Chief Complaint  Osman Choi is a 80 y.o. male admitted 4/16/2025 with hypotension    Hospital Course  80 y.o. male who presented 4/16/2025 with Parkinson's, mod/severe AS, AFib on apixaban, ETOH use with a recent shoulder Fx s/p ORIF 4/13-14.  Pt had been recovering at [facility] when he developed hypotension and was sent to us for evaluation given his hewly Dx'd severe aortic stenosis.  Here pt was admitted to the ICU and given fluid resuscitation as well as Digoxin IV load..  He has had significant issues with agitation and delerium requiring precedex drip     Interval Problem Update  Pt O though mildly confused.  Anxious to get out of bed but no other complaints    Dw family at the bedside and on the phone  [facility]      4/20 patient is new to me today, he is in bed, family is at bedside, he is alert oriented follows commands, shoulder pain is improved and he is having well-controlled with Tylenol, I discussed with previous hospitalist from [facility], I reviewed note from cardiology reviewed note from critical care, patient is on Eliquis we will monitor for side effects, discussed with bedside nurse charge nurse  pharmacist, heart rate is better controlled, blood pressure is improved, continue digoxin.    I have discussed this patient's plan of care and discharge plan at IDT rounds today with Case Management, Nursing, Nursing leadership, and other members of the IDT team.    Consultants/Specialty  cardiology    Code Status  Full Code    Disposition    Anticipate discharge to: an inpatient rehabilitation hospital    I have placed the appropriate orders for post-discharge needs.    Review of Systems  Review of Systems   Constitutional:  Negative for chills and fever.   Respiratory:  Negative for cough and shortness of breath.    Cardiovascular:  Negative for chest pain, palpitations and leg swelling.   Gastrointestinal:   Negative for abdominal pain, diarrhea, nausea and vomiting.   Genitourinary:  Negative for dysuria and urgency.   Musculoskeletal:  Negative for back pain.        L shoulder pain   Skin:  Negative for rash.   Neurological:  Negative for dizziness, loss of consciousness and headaches.        Physical Exam  Temp:  [36.4 °C (97.5 °F)-37.1 °C (98.8 °F)] 36.7 °C (98.1 °F)  Pulse:  [] 86  Resp:  [16-18] 17  BP: ()/(43-84) 108/75  SpO2:  [92 %-95 %] 92 %    Physical Exam  Constitutional:       General: He is not in acute distress.     Appearance: He is well-developed. He is not diaphoretic.   HENT:      Head: Normocephalic and atraumatic.   Eyes:      Conjunctiva/sclera: Conjunctivae normal.   Neck:      Vascular: No JVD.   Cardiovascular:      Rate and Rhythm: Normal rate. Rhythm irregular.      Heart sounds: No murmur heard.     No gallop.   Pulmonary:      Effort: Pulmonary effort is normal. No respiratory distress.      Breath sounds: No stridor. No wheezing or rales.   Abdominal:      Palpations: Abdomen is soft.      Tenderness: There is no abdominal tenderness. There is no guarding or rebound.   Musculoskeletal:      Right lower leg: No edema.      Left lower leg: No edema.      Comments: L arm in a sling with post op dressings in place   Skin:     General: Skin is warm and dry.      Capillary Refill: Capillary refill takes less than 2 seconds.      Findings: No rash.   Neurological:      Mental Status: He is oriented to person, place, and time.   Psychiatric:         Mood and Affect: Mood normal.         Behavior: Behavior normal.         Thought Content: Thought content normal.         Fluids    Intake/Output Summary (Last 24 hours) at 4/20/2025 1334  Last data filed at 4/20/2025 0800  Gross per 24 hour   Intake 480 ml   Output --   Net 480 ml        Laboratory  Recent Labs     04/18/25  0311 04/19/25  0445   WBC 12.8* 11.9*   RBC 3.57* 3.50*   HEMOGLOBIN 10.8* 10.5*   HEMATOCRIT 33.0* 32.5*   MCV 92.4  92.9   MCH 30.3 30.0   MCHC 32.7 32.3   RDW 48.5 48.7   PLATELETCT 208 223   MPV 9.7 9.1     Recent Labs     04/18/25  0311 04/19/25  0445 04/20/25  0147   SODIUM 136 139 139   POTASSIUM 4.2 4.1 3.8   CHLORIDE 105 108 106   CO2 19* 20 22   GLUCOSE 94 108* 103*   BUN 17 20 18   CREATININE 0.66 0.68 0.61   CALCIUM 7.8* 7.8* 7.9*                   Imaging  EC-ECHOCARDIOGRAM COMPLETE W/O CONT   Final Result      EC-ECHOCARDIOGRAM COMPLETE W/ CONT         DX-CHEST-LIMITED (1 VIEW)   Final Result         1.  Bilateral basilar atelectasis, no focal infiltrate   2.  Cardiomegaly           Assessment/Plan  * Nonrheumatic aortic valve stenosis  Assessment & Plan  Severe based on TTE:  Has been referred for outpt evaluation for TAVR    Follow-up with cardiology as outpatient for TAVR procedure    Acute metabolic encephalopathy  Assessment & Plan  Mental status is markedly improved today  Cont to minimize sedating medications  Start scheduled APAP and qhs melatonin    Resolved patient is back to baseline      HLD (hyperlipidemia)  Assessment & Plan  Monitoring    Hypotension  Assessment & Plan  Thought to be due to vol vs cardiogenic vs less likely septic  In setting of severe aortic stenosis    Improving      Shoulder fracture  Assessment & Plan  Status post SHOULDER REVERSE ARTHROPLASTY TOTAL REPLACEMENT, DE ARTHROPLASTY GLENOHUMERAL JOINT TOTAL SHOULDER on 4/14/25..  Continue pain management  Will need follow-up with orthopedic surgery as outpatient.  Continue sling for comfort    Atrial fibrillation (HCC)  Assessment & Plan  On apixaban  Had been on propafenone as an outpt: structural cardiac team recommend stopping  Based on underlying structural heart disease Cards feel unlikely to do well with amio  Has had hypotension with metoprolol  Now s/p IV Digoxin load: cont 125mcg daily  Unclear how much of his tachycardic events are driven by external issues (agitation/anxiety)    Continue digoxin  Avoid beta-blockers since this  can cause hypotension on this patient    Parkinson's disease with dyskinesia without fluctuating manifestations (HCC)- (present on admission)  Assessment & Plan  Cont home regimen of   -sinemet  -rotiglotine         VTE prophylaxis: Eliquis    I have performed a physical exam and reviewed and updated ROS and Plan today (4/20/2025). In review of yesterday's note (4/19/2025), there are no changes except as documented above.      I reviewed CBC  I reviewed BMP  I reviewed magnesium levels  Reviewed phosphorus levels  I discussed with previous hospitalist from [facility]  I reviewed note from cardiology  I review outpatient medical records  I discussed with clinical pharmacist  Patient was recently started on digoxin is a new medication monitor for side effects including limited to cardiac arrhythmias, monitoring electrolytes additional potassium is within normal limits.  I have ordered blood work for in a.m.

## 2025-04-21 LAB
ANION GAP SERPL CALC-SCNC: 10 MMOL/L (ref 7–16)
BUN SERPL-MCNC: 16 MG/DL (ref 8–22)
CALCIUM SERPL-MCNC: 8.2 MG/DL (ref 8.5–10.5)
CHLORIDE SERPL-SCNC: 105 MMOL/L (ref 96–112)
CO2 SERPL-SCNC: 22 MMOL/L (ref 20–33)
CREAT SERPL-MCNC: 0.63 MG/DL (ref 0.5–1.4)
GFR SERPLBLD CREATININE-BSD FMLA CKD-EPI: 97 ML/MIN/1.73 M 2
GLUCOSE SERPL-MCNC: 98 MG/DL (ref 65–99)
MAGNESIUM SERPL-MCNC: 2.1 MG/DL (ref 1.5–2.5)
PHOSPHATE SERPL-MCNC: 3.4 MG/DL (ref 2.5–4.5)
POTASSIUM SERPL-SCNC: 3.9 MMOL/L (ref 3.6–5.5)
SODIUM SERPL-SCNC: 137 MMOL/L (ref 135–145)

## 2025-04-21 PROCEDURE — 84100 ASSAY OF PHOSPHORUS: CPT

## 2025-04-21 PROCEDURE — 83735 ASSAY OF MAGNESIUM: CPT

## 2025-04-21 PROCEDURE — 700102 HCHG RX REV CODE 250 W/ 637 OVERRIDE(OP): Performed by: INTERNAL MEDICINE

## 2025-04-21 PROCEDURE — 700102 HCHG RX REV CODE 250 W/ 637 OVERRIDE(OP)

## 2025-04-21 PROCEDURE — 700102 HCHG RX REV CODE 250 W/ 637 OVERRIDE(OP): Performed by: HOSPITALIST

## 2025-04-21 PROCEDURE — 80048 BASIC METABOLIC PNL TOTAL CA: CPT

## 2025-04-21 PROCEDURE — 700102 HCHG RX REV CODE 250 W/ 637 OVERRIDE(OP): Performed by: STUDENT IN AN ORGANIZED HEALTH CARE EDUCATION/TRAINING PROGRAM

## 2025-04-21 PROCEDURE — 97166 OT EVAL MOD COMPLEX 45 MIN: CPT

## 2025-04-21 PROCEDURE — A9270 NON-COVERED ITEM OR SERVICE: HCPCS | Performed by: INTERNAL MEDICINE

## 2025-04-21 PROCEDURE — 36415 COLL VENOUS BLD VENIPUNCTURE: CPT

## 2025-04-21 PROCEDURE — A9270 NON-COVERED ITEM OR SERVICE: HCPCS | Performed by: STUDENT IN AN ORGANIZED HEALTH CARE EDUCATION/TRAINING PROGRAM

## 2025-04-21 PROCEDURE — 770020 HCHG ROOM/CARE - TELE (206)

## 2025-04-21 PROCEDURE — 99232 SBSQ HOSP IP/OBS MODERATE 35: CPT | Performed by: HOSPITALIST

## 2025-04-21 PROCEDURE — A9270 NON-COVERED ITEM OR SERVICE: HCPCS | Performed by: HOSPITALIST

## 2025-04-21 PROCEDURE — 97535 SELF CARE MNGMENT TRAINING: CPT

## 2025-04-21 RX ADMIN — APIXABAN 5 MG: 5 TABLET, FILM COATED ORAL at 16:46

## 2025-04-21 RX ADMIN — ROTIGOTINE 8 MG: 8 PATCH, EXTENDED RELEASE TRANSDERMAL at 06:00

## 2025-04-21 RX ADMIN — RASAGILINE 0.5 MG: 1 TABLET ORAL at 05:51

## 2025-04-21 RX ADMIN — ACETAMINOPHEN 1000 MG: 500 TABLET, FILM COATED ORAL at 21:13

## 2025-04-21 RX ADMIN — Medication 5 MG: at 21:13

## 2025-04-21 RX ADMIN — ASPIRIN 81 MG: 81 TABLET, COATED ORAL at 05:51

## 2025-04-21 RX ADMIN — DIGOXIN 125 MCG: 0.12 TABLET ORAL at 16:46

## 2025-04-21 RX ADMIN — CARBIDOPA AND LEVODOPA 2 TABLET: 25; 100 TABLET ORAL at 05:51

## 2025-04-21 RX ADMIN — ACETAMINOPHEN 1000 MG: 500 TABLET, FILM COATED ORAL at 10:13

## 2025-04-21 RX ADMIN — CARBIDOPA AND LEVODOPA 2 TABLET: 25; 100 TABLET ORAL at 16:46

## 2025-04-21 RX ADMIN — ACETAMINOPHEN 1000 MG: 500 TABLET, FILM COATED ORAL at 05:51

## 2025-04-21 RX ADMIN — PRAVASTATIN SODIUM 40 MG: 20 TABLET ORAL at 21:13

## 2025-04-21 RX ADMIN — ACETAMINOPHEN 1000 MG: 500 TABLET, FILM COATED ORAL at 16:46

## 2025-04-21 RX ADMIN — APIXABAN 5 MG: 5 TABLET, FILM COATED ORAL at 05:51

## 2025-04-21 RX ADMIN — CARBIDOPA AND LEVODOPA 2 TABLET: 25; 100 TABLET ORAL at 11:50

## 2025-04-21 ASSESSMENT — ENCOUNTER SYMPTOMS
DIARRHEA: 0
LOSS OF CONSCIOUSNESS: 0
COUGH: 0
PALPITATIONS: 0
VOMITING: 0
ABDOMINAL PAIN: 0
BACK PAIN: 0
FEVER: 0
HEADACHES: 0
SHORTNESS OF BREATH: 0
DIZZINESS: 0
CHILLS: 0
NAUSEA: 0

## 2025-04-21 ASSESSMENT — COGNITIVE AND FUNCTIONAL STATUS - GENERAL
TOILETING: A LITTLE
DAILY ACTIVITIY SCORE: 17
SUGGESTED CMS G CODE MODIFIER DAILY ACTIVITY: CK
DRESSING REGULAR LOWER BODY CLOTHING: A LITTLE
HELP NEEDED FOR BATHING: A LOT
PERSONAL GROOMING: A LITTLE
DRESSING REGULAR UPPER BODY CLOTHING: A LOT

## 2025-04-21 ASSESSMENT — ACTIVITIES OF DAILY LIVING (ADL): TOILETING: INDEPENDENT

## 2025-04-21 ASSESSMENT — PAIN DESCRIPTION - PAIN TYPE
TYPE: ACUTE PAIN
TYPE: ACUTE PAIN

## 2025-04-21 ASSESSMENT — FIBROSIS 4 INDEX: FIB4 SCORE: 2.78

## 2025-04-21 NOTE — DISCHARGE PLANNING
-1010  DPA left message for Karen at [facility], requesting a call back regarding pending SNF referral.    -[phone]  DPA left message for Karen at [facility], requesting a call back regarding pending SNF referral.     -[phone]  DPA informed by Karen at [facility] that facility will have a bed available for pt on Wednesday. Karen requesting that pt get to facility by 1200 on Wednesday. RN CM notified.     -[phone]  DPA manually faxed appeal documentation to [facility].

## 2025-04-21 NOTE — DISCHARGE PLANNING
Care Transition Team Discharge Planning    Anticipated Discharge Information  Discharge Disposition: D/T to SNF with Medicare cert in anticipation of skilled care (03)  Discharge Address: [address] [place] [address] [place] [address]  Discharge Contact Phone Number: [phone]    Discharge Plan:  RNCM met with patient and spouse at bedside. Informed her that we have not yet heard back from [facility]. Provided list of accepting SNFs to spouse to research as patient is . Spouse provided phone number for a contact she had at [facility] [phone], requested DPA to f/u.    1245: DPA has not heard back from [facility]. Received VM from spouse Navya stating that she has spoken to [facility] swing bed and reserved the first available room which is available Thursday 4/24/25. Received call from patient's daughter and spouse on a 3 way call. Informed them again of accepting local SNFs and medicare policy. They plan to appeal and potentially self pay to stay here through Thursday. Informed them they may also have to pay for transportation as patient has accepting local SNFs. Escalated to leadership. IMM delivered to spouse.

## 2025-04-21 NOTE — DISCHARGE SUMMARY
"Discharge Summary    CHIEF COMPLAINT ON ADMISSION  No chief complaint on file.      Reason for Admission  Cardiology (aortic valve stenosis)    Admission Date  4/16/2025     CODE STATUS  Full Code    HPI & HOSPITAL COURSE  Please see original H&P and consult note for specific information    80 y.o. male who presented 4/16/2025 with Parkinson's, mod/severe AS, AFib on apixaban, ETOH use with a recent shoulder Fx s/p ORIF 4/13-14.  Pt had been recovering at [facility] when he developed hypotension and was sent to us for evaluation given his hewly Dx'd severe aortic stenosis.  Here pt was admitted to the ICU and given fluid resuscitation as well as Digoxin IV load..  He has had significant issues with agitation and delerium requiring precedex drip     On my examination the pt states he's feels \"good\".  Brother at bedside states he's doing much better today, is now thinking more clearly and much more like his normal self.      4/19 Pt O though mildly confused.  Anxious to get out of bed but no other complaints   Dw family at the bedside and on the phone  DW Cardiology    4/20 patient is new to me today, he is in bed, family is at bedside, he is alert oriented follows commands, shoulder pain is improved and he is having well-controlled with Tylenol, I discussed with previous hospitalist from [facility], I reviewed note from cardiology reviewed note from critical care, patient is on Eliquis we will monitor for side effects, discussed with bedside nurse charge nurse  pharmacist, heart rate is better controlled, blood pressure is improved, continue digoxin.  4/21 patient is resting in chair, patient is alert oriented follows commands, family is at bedside, he is in good spirit, he worked with Occupational Therapy today, denies any chest pain palpitations, discussed with bedside nurse charge nurse  pharmacist      Patient today is in good spirit, patient has been recommended for postacute placement, " family is at bedside, there is facility available although is not the first choice for patient, patient is medically cleared for discharge, PT OT, cardiology cleared patient, heart rate is well-controlled with digoxin, continue Eliquis, patient will need to continue physical rehabilitation for his recent shoulder fracture, discussed with bedside nurse charge nurse , I was informed later today by  that patient and patient family are going to appeal discharge since their first choice is not available at this time.    Therefore, he is discharged in good and stable condition to skilled nursing facility.    The patient met 2-midnight criteria for an inpatient stay at the time of discharge.      FOLLOW UP ITEMS POST DISCHARGE  Primary care physician  Cardiology  Orthopedic surgery    DISCHARGE DIAGNOSES  Principal Problem:    Nonrheumatic aortic valve stenosis (POA: Unknown)  Active Problems:    Parkinson's disease with dyskinesia without fluctuating manifestations (HCC) (POA: Yes)    Atrial fibrillation (HCC) (POA: Unknown)    Shoulder fracture (POA: Unknown)    Hypotension (POA: Unknown)    HLD (hyperlipidemia) (POA: Unknown)    Acute metabolic encephalopathy (POA: Unknown)  Resolved Problems:    * No resolved hospital problems. *      FOLLOW UP  Future Appointments   Date Time Provider Department Center   5/8/2025  9:30 AM Wes Hoskins M.D. [facility] None   5/14/2025  8:40 AM ALDAIR Perez None   8/14/2025 11:20 AM ALDAIR Chavez None   12/4/2025 11:20 AM ALDAIR Chavez None     No follow-up provider specified.    MEDICATIONS ON DISCHARGE     Medication List        ASK your doctor about these medications        Instructions   alendronate 70 MG Tabs  Commonly known as: Fosamax   Take 70 mg by mouth every 7 days.  Dose: 70 mg     aspirin 81 MG EC tablet   Take 81 mg by mouth every day.  Dose: 81 mg     B-12 1000 MCG Tabs   Take 1 Tablet by mouth every day.  Dose: 1  Tablet     carbidopa-levodopa  MG Tabs  Commonly known as: Sinemet   Take 2 Tablets by mouth 3 times a day. For Parkinson's  Dose: 2 Tablet     D3 2000 PO   Take 1 Tablet by mouth every day.  Dose: 1 Tablet     Eliquis 5mg Tabs  Generic drug: apixaban   Take 5 mg by mouth 2 times a day.  Dose: 5 mg     Neupro 8 MG/24HR Pt24  Generic drug: Rotigotine   Place 8 mg on the skin every day. For Parkinson's  Dose: 8 mg     pravastatin 40 MG tablet  Commonly known as: Pravachol   Take 40 mg by mouth every evening.  Dose: 40 mg     propafenone 150 MG Tabs  Commonly known as: Rythmol   Take 150 mg by mouth every 8 hours.  Dose: 150 mg     Rasagiline Mesylate 0.5 MG Tabs   Take 1 Tablet by mouth every day. For Parkinson's  Dose: 1 Tablet              Allergies  No Known Allergies    DIET  Orders Placed This Encounter   Procedures    Diet Order Diet: Regular     Standing Status:   Standing     Number of Occurrences:   1     Diet::   Regular [1]       ACTIVITY  As tolerated.  Weight bearing as tolerated    LINES, DRAINS, AND WOUNDS  This is an automated list. Peripheral IVs will be removed prior to discharge.  Peripheral IV 04/16/25 20 G Anterior;Left Forearm (Active)   Site Assessment Clean;Dry;Intact 04/21/25 0900   Dressing Type Transparent 04/21/25 0900   Line Status Flushed;Scrubbed the hub prior to access;Saline locked 04/21/25 0900   Dressing Status Clean;Dry;Intact 04/21/25 0900   Dressing Intervention N/A 04/21/25 0900   Dressing Change Due 04/18/25 04/18/25 0800   Infiltration Grading ([facility]) 0 04/21/25 0900   Phlebitis Scale ([facility] Only) 0 04/21/25 0900       Peripheral IV 04/18/25 20 G Posterior;Right Wrist (Active)   Site Assessment Clean;Dry;Intact 04/21/25 0900   Dressing Type Transparent 04/21/25 0900   Line Status Flushed;Scrubbed the hub prior to access;Saline locked 04/21/25 0900   Dressing Status Clean;Dry;Intact 04/21/25 0900   Dressing Intervention N/A 04/21/25 0900   Dressing Change Due  04/19/25 04/18/25 2015   Infiltration Grading ([facility], [facility]) 0 04/21/25 0900   Phlebitis Scale ([facility] Only) 0 04/21/25 0900          Peripheral IV 04/16/25 20 G Anterior;Left Forearm (Active)   Site Assessment Clean;Dry;Intact 04/21/25 0900   Dressing Type Transparent 04/21/25 0900   Line Status Flushed;Scrubbed the hub prior to access;Saline locked 04/21/25 0900   Dressing Status Clean;Dry;Intact 04/21/25 0900   Dressing Intervention N/A 04/21/25 0900   Dressing Change Due 04/18/25 04/18/25 0800   Infiltration Grading ([facility]) 0 04/21/25 0900   Phlebitis Scale ([facility] Only) 0 04/21/25 0900       Peripheral IV 04/18/25 20 G Posterior;Right Wrist (Active)   Site Assessment Clean;Dry;Intact 04/21/25 0900   Dressing Type Transparent 04/21/25 0900   Line Status Flushed;Scrubbed the hub prior to access;Saline locked 04/21/25 0900   Dressing Status Clean;Dry;Intact 04/21/25 0900   Dressing Intervention N/A 04/21/25 0900   Dressing Change Due 04/19/25 04/18/25 2015   Infiltration Grading ([facility]) 0 04/21/25 0900   Phlebitis Scale ([facility] Only) 0 04/21/25 0900               MENTAL STATUS ON TRANSFER             CONSULTATIONS  Cardiology  Critical care    PROCEDURES  None    LABORATORY  Lab Results   Component Value Date    SODIUM 137 04/21/2025    POTASSIUM 3.9 04/21/2025    CHLORIDE 105 04/21/2025    CO2 22 04/21/2025    GLUCOSE 98 04/21/2025    BUN 16 04/21/2025    CREATININE 0.63 04/21/2025        Lab Results   Component Value Date    WBC 11.9 (H) 04/19/2025    HEMOGLOBIN 10.5 (L) 04/19/2025    HEMATOCRIT 32.5 (L) 04/19/2025    PLATELETCT 223 04/19/2025

## 2025-04-21 NOTE — CARE PLAN
The patient is Stable - Low risk of patient condition declining or worsening    Shift Goals  Clinical Goals: Remain free of falls, no skin breakdown  Patient Goals: Rest  Family Goals: Nina    Progress made toward(s) clinical / shift goals:  Progressing      Problem: Knowledge Deficit - Standard  Goal: Patient and family/care givers will demonstrate understanding of plan of care, disease process/condition, diagnostic tests and medications  Outcome: Progressing  Note: Pt and family verbalize understanding of plan of care, pt requires occasional reinforcement of education     Problem: Fall Risk  Goal: Patient will remain free from falls  Outcome: Progressing  Note: Pt wearing treaded slipper socks, strip alarm in use, bed in lowest position and locked, call light within reach

## 2025-04-21 NOTE — THERAPY
"Occupational Therapy   Initial Evaluation     Patient Name: Osman Azul Choi  Age:  80 y.o., Sex:  male  Medical Record #: [id number]  Today's Date: 4/21/2025     Precautions  Precautions: (P) Fall Risk, Non Weight Bearing Left Upper Extremity, Sling Left Upper Extremity  Comments: (P) per note from DANIELA ambriz LUE, elbow,wrist hand ROM ok. begin pendulum    Assessment  Patient is 80 y.o. male hx of ETOH, Parkinson's, with a diagnosis of Severe aortic stenosis, GLF and L TSA 4/14/25.   Pt seen for OT eval. Pt presents with limitations in strength, functional use d/t recent LUE shoulder sx, balance and activity tolerance. Pt educ on LUE precautions, therex, functional activities and expectations with recovery. Pt required Rickey for functional mobility with SPC, CGA for toileting. Pt sling readjusted, educ on edema mgmt, ice use, all questions answered. Pt will continue to benefit from inpt OT , recommend post acute placement.       Plan    Occupational Therapy Initial Treatment Plan   Treatment Interventions: (P) Self Care / Activities of Daily Living, Adaptive Equipment, Neuro Re-Education / Balance, Therapeutic Exercises, Therapeutic Activity  Treatment Frequency: (P) 4 Times per Week  Duration: (P) Until Therapy Goals Met    DC Equipment Recommendations: (P) Unable to determine at this time  Discharge Recommendations: (P) Recommend post-acute placement for additional occupational therapy services prior to discharge home     Subjective    \"Im ready for rehab\"      Objective         04/21/25 0918   Prior Living Situation   Prior Services None   Housing / Facility 2 Story House   Steps Into Home 14   Steps In Home 0   Bathroom Set up Walk In Shower;Built-In Shower Chair;Grab Bars   Equipment Owned Front-Wheel Walker;Single Point Cane;Grab Bar(s) In Tub / Shower;Tub / Shower Seat   Lives with - Patient's Self Care Capacity Spouse   Comments Pt spouse is very supportive   Prior Level of ADL Function   Self Feeding " Independent   Grooming / Hygiene Independent   Bathing Independent   Dressing Independent   Toileting Independent   Prior Level of IADL Function   Medication Management Independent   Laundry Independent   Kitchen Mobility Independent   Finances Independent   Home Management Independent   Shopping Independent   Prior Level Of Mobility Independent Without Device in Community   Driving / Transportation Driving Independent   History of Falls   History of Falls Yes   Date of Last Fall   (led to admission in [place] and L shoulder sx)   Precautions   Precautions Fall Risk;Non Weight Bearing Left Upper Extremity;Sling Left Upper Extremity   Comments per note from katina, NWB LUE, elbow,wrist hand ROM ok. begin pendulum   Vitals   Pulse Oximetry 94 %   O2 Delivery Device None - Room Air   Pain   Intervention Declines   Pain 0 - 10 Group   Location Shoulder   Location Orientation Left   Pain Rating Scale (NPRS) 3   Therapist Pain Assessment During Activity;Post Activity Pain Same as Prior to Activity;Nurse Notified;3   Cognition    Cognition / Consciousness X   Level of Consciousness Alert   Ability To Follow Commands 1 Step   Safety Awareness Impaired   Comments pleasant and cooperative, required cues for safety and technique   Passive ROM Upper Body   Passive ROM Upper Body X   Comments NT at L shoulder, elbow WFL RUE WFL   Active ROM Upper Body   Active ROM Upper Body  X   Dominant Hand Right   Comments L elbow flex/ext half range, wrist WFL, open/close hand WFL limited d/t swelling   Strength Upper Body   Upper Body Strength  X   Comments RUE WFL, LUE  3+/5   Sensation Upper Body   Upper Extremity Sensation  X   Comments mild numbness/tingling in 1st and 2nd digit   Upper Body Muscle Tone   Upper Body Muscle Tone  WDL   Neurological Concerns   Neurological Concerns No   Coordination Upper Body   Coordination X   Comments limited LUE   Balance Assessment   Sitting Balance (Static) Fair -   Sitting Balance (Dynamic)  Poor +   Standing Balance (Static) Fair -   Standing Balance (Dynamic) Fair -   Weight Shift Sitting Fair   Weight Shift Standing Fair   Comments w/SPC   Bed Mobility    Comments was up in chair   ADL Assessment   Eating Independent   Grooming Standby Assist;Seated  (set-up assist)   Upper Body Dressing Moderate Assist   Lower Body Dressing Moderate Assist   Toileting Contact Guard Assist   Functional Mobility   Sit to Stand Minimal Assist   Bed, Chair, Wheelchair Transfer Minimal Assist   Toilet Transfers Minimal Assist   Transfer Method Stand Step   Mobility chair> STS> in rm> bathroom> chair   Comments w/SPC   Visual Perception   Visual Perception  X   Comments chronic diplopia   Edema / Skin Assessment   Edema / Skin  X   Comments LUE edema   Activity Tolerance   Sitting in Chair pre and post session   Standing 10 min   Patient / Family Goals   Patient / Family Goal #1 to go to rehab   Short Term Goals   Short Term Goal # 1 Pt will complete UB dressing including sling Rickey   Short Term Goal # 2 Pt will complete standing grooming tasks supervised   Short Term Goal # 3 Pt will complete LB dressing supervised with A/E   Short Term Goal # 4 Pt will complete toileting supervised   Education Group   Education Provided Role of Occupational Therapist;Activities of Daily Living;Brace Wear and Care;Adaptive Equipment   Role of Occupational Therapist Patient Response Patient;Acceptance;Demonstration;Explanation;Verbal Demonstration;Action Demonstration   Brace Wear & Care Patient Response Patient;Family;Acceptance;Explanation;Demonstration;Action Demonstration   ADL Patient Response Patient;Acceptance;Explanation;Demonstration;Verbal Demonstration;Action Demonstration   Adaptive Equipment Patient Response Patient;Family;Acceptance;Explanation;Demonstration;Action Demonstration;Verbal Demonstration   Occupational Therapy Initial Treatment Plan    Treatment Interventions Self Care / Activities of Daily Living;Adaptive  Equipment;Neuro Re-Education / Balance;Therapeutic Exercises;Therapeutic Activity   Treatment Frequency 4 Times per Week   Duration Until Therapy Goals Met   Problem List   Problem List Decreased Active Daily Living Skills;Decreased Upper Extremity Strength Left;Decreased Upper Extremity AROM Left;Decreased Upper Extremity PROM Left;Decreased Activity Tolerance;Decreased Functional Mobility;Safety Awareness Deficits / Cognition;Impaired Coordination Left Upper Extremity;Impaired Sensation Left Upper Extremity   Anticipated Discharge Equipment and Recommendations   DC Equipment Recommendations Unable to determine at this time   Discharge Recommendations Recommend post-acute placement for additional occupational therapy services prior to discharge home   Interdisciplinary Plan of Care Collaboration   IDT Collaboration with  Nursing   Patient Position at End of Therapy Seated;Chair Alarm On;Call Light within Reach;Tray Table within Reach   Collaboration Comments RN updated   Session Information   Date / Session Number  4/21, #1 (1/4, 4/27)

## 2025-04-21 NOTE — PROGRESS NOTES
Hospital Medicine Daily Progress Note    Date of Service  4/21/2025    Chief Complaint  Osman Choi is a 80 y.o. male admitted 4/16/2025 with hypotension    Hospital Course  80 y.o. male who presented 4/16/2025 with Parkinson's, mod/severe AS, AFib on apixaban, ETOH use with a recent shoulder Fx s/p ORIF 4/13-14.  Pt had been recovering at [facility] when he developed hypotension and was sent to us for evaluation given his hewly Dx'd severe aortic stenosis.  Here pt was admitted to the ICU and given fluid resuscitation as well as Digoxin IV load..  He has had significant issues with agitation and delerium requiring precedex drip     Interval Problem Update  Pt O though mildly confused.  Anxious to get out of bed but no other complaints    Dw family at the bedside and on the phone  DW Cardiology      4/20 patient is new to me today, he is in bed, family is at bedside, he is alert oriented follows commands, shoulder pain is improved and he is having well-controlled with Tylenol, I discussed with previous hospitalist from [facility], I reviewed note from cardiology reviewed note from critical care, patient is on Eliquis we will monitor for side effects, discussed with bedside nurse charge nurse  pharmacist, heart rate is better controlled, blood pressure is improved, continue digoxin.  4/21 patient is resting in chair, patient is alert oriented follows commands, family is at bedside, he is in good spirit, he worked with Occupational Therapy today, denies any chest pain palpitations, discussed with bedside nurse charge nurse  pharmacist    I have discussed this patient's plan of care and discharge plan at IDT rounds today with Case Management, Nursing, Nursing leadership, and other members of the IDT team.    Consultants/Specialty  cardiology    Code Status  Full Code    Disposition  The patient is medically cleared for discharge to home or a post-acute facility.  Anticipate discharge to: an  inpatient rehabilitation hospital    I have placed the appropriate orders for post-discharge needs.    Review of Systems  Review of Systems   Constitutional:  Negative for chills and fever.   Respiratory:  Negative for cough and shortness of breath.    Cardiovascular:  Negative for chest pain, palpitations and leg swelling.   Gastrointestinal:  Negative for abdominal pain, diarrhea, nausea and vomiting.   Genitourinary:  Negative for dysuria and urgency.   Musculoskeletal:  Negative for back pain.        L shoulder pain   Skin:  Negative for rash.   Neurological:  Negative for dizziness, loss of consciousness and headaches.        Physical Exam  Temp:  [36.2 °C (97.1 °F)-37 °C (98.6 °F)] 36.2 °C (97.1 °F)  Pulse:  [65-92] 79  Resp:  [16-18] 17  BP: ()/(56-81) 102/56  SpO2:  [91 %-95 %] 91 %    Physical Exam  Constitutional:       General: He is not in acute distress.     Appearance: He is well-developed. He is not diaphoretic.   HENT:      Head: Normocephalic and atraumatic.   Eyes:      Conjunctiva/sclera: Conjunctivae normal.   Neck:      Vascular: No JVD.   Cardiovascular:      Rate and Rhythm: Normal rate. Rhythm irregular.      Heart sounds: No murmur heard.     No gallop.   Pulmonary:      Effort: Pulmonary effort is normal. No respiratory distress.      Breath sounds: No stridor. No wheezing or rales.   Abdominal:      Palpations: Abdomen is soft.      Tenderness: There is no abdominal tenderness. There is no guarding or rebound.   Musculoskeletal:      Right lower leg: No edema.      Left lower leg: No edema.      Comments: L arm in a sling with post op dressings in place   Skin:     General: Skin is warm and dry.      Capillary Refill: Capillary refill takes less than 2 seconds.      Findings: No rash.   Neurological:      Mental Status: He is oriented to person, place, and time.   Psychiatric:         Mood and Affect: Mood normal.         Behavior: Behavior normal.         Thought Content: Thought  content normal.         Fluids    Intake/Output Summary (Last 24 hours) at 4/21/2025 1519  Last data filed at 4/21/2025 0012  Gross per 24 hour   Intake --   Output 225 ml   Net -225 ml        Laboratory  Recent Labs     04/19/25  0445   WBC 11.9*   RBC 3.50*   HEMOGLOBIN 10.5*   HEMATOCRIT 32.5*   MCV 92.9   MCH 30.0   MCHC 32.3   RDW 48.7   PLATELETCT 223   MPV 9.1     Recent Labs     04/19/25  0445 04/20/25  0147 04/21/25  0057   SODIUM 139 139 137   POTASSIUM 4.1 3.8 3.9   CHLORIDE 108 106 105   CO2 20 22 22   GLUCOSE 108* 103* 98   BUN 20 18 16   CREATININE 0.68 0.61 0.63   CALCIUM 7.8* 7.9* 8.2*                   Imaging  EC-ECHOCARDIOGRAM COMPLETE W/O CONT   Final Result      EC-ECHOCARDIOGRAM COMPLETE W/ CONT         DX-CHEST-LIMITED (1 VIEW)   Final Result         1.  Bilateral basilar atelectasis, no focal infiltrate   2.  Cardiomegaly           Assessment/Plan  * Nonrheumatic aortic valve stenosis  Assessment & Plan  Severe based on TTE:  Has been referred for outpt evaluation for TAVR    Follow-up with cardiology as outpatient for TAVR procedure    Acute metabolic encephalopathy  Assessment & Plan  Mental status is markedly improved today  Cont to minimize sedating medications  Start scheduled APAP and qhs melatonin      Improved back to baseline      HLD (hyperlipidemia)  Assessment & Plan  Monitoring    Hypotension  Assessment & Plan  Thought to be due to vol vs cardiogenic vs less likely septic  In setting of severe aortic stenosis    Improving, continue monitoring      Shoulder fracture  Assessment & Plan  Status post SHOULDER REVERSE ARTHROPLASTY TOTAL REPLACEMENT, CO ARTHROPLASTY GLENOHUMERAL JOINT TOTAL SHOULDER on 4/14/25..  Continue pain management  Will need follow-up with orthopedic surgery as outpatient.  Continue sling for comfort    Atrial fibrillation (HCC)  Assessment & Plan  On apixaban  Had been on propafenone as an outpt: structural cardiac team recommend stopping  Based on underlying  structural heart disease Cards feel unlikely to do well with amio  Has had hypotension with metoprolol  Now s/p IV Digoxin load: cont 125mcg daily  Unclear how much of his tachycardic events are driven by external issues (agitation/anxiety)    Continue digoxin  Avoid beta-blockers since this can cause hypotension on this patient    Better control with digoxin    Parkinson's disease with dyskinesia without fluctuating manifestations (HCC)- (present on admission)  Assessment & Plan  Cont home regimen of   -sinemet  -rotiglotine         VTE prophylaxis: Eliquis    I have performed a physical exam and reviewed and updated ROS and Plan today (4/21/2025). In review of yesterday's note (4/20/2025), there are no changes except as documented above.      I reviewed BMP  Reviewed phosphorus  Reviewed magnesium  Discussed with clinical pharmacist

## 2025-04-22 LAB
ANION GAP SERPL CALC-SCNC: 13 MMOL/L (ref 7–16)
BUN SERPL-MCNC: 17 MG/DL (ref 8–22)
CALCIUM SERPL-MCNC: 8.4 MG/DL (ref 8.5–10.5)
CHLORIDE SERPL-SCNC: 104 MMOL/L (ref 96–112)
CO2 SERPL-SCNC: 20 MMOL/L (ref 20–33)
CREAT SERPL-MCNC: 0.84 MG/DL (ref 0.5–1.4)
DIGOXIN SERPL-MCNC: 0.8 NG/ML (ref 0.8–2)
ERYTHROCYTE [DISTWIDTH] IN BLOOD BY AUTOMATED COUNT: 49.9 FL (ref 35.9–50)
GFR SERPLBLD CREATININE-BSD FMLA CKD-EPI: 89 ML/MIN/1.73 M 2
GLUCOSE SERPL-MCNC: 130 MG/DL (ref 65–99)
HCT VFR BLD AUTO: 33.2 % (ref 42–52)
HGB BLD-MCNC: 10.7 G/DL (ref 14–18)
MAGNESIUM SERPL-MCNC: 2.4 MG/DL (ref 1.5–2.5)
MCH RBC QN AUTO: 30.3 PG (ref 27–33)
MCHC RBC AUTO-ENTMCNC: 32.2 G/DL (ref 32.3–36.5)
MCV RBC AUTO: 94.1 FL (ref 81.4–97.8)
PHOSPHATE SERPL-MCNC: 2.9 MG/DL (ref 2.5–4.5)
PLATELET # BLD AUTO: 281 K/UL (ref 164–446)
PMV BLD AUTO: 9 FL (ref 9–12.9)
POTASSIUM SERPL-SCNC: 4.1 MMOL/L (ref 3.6–5.5)
RBC # BLD AUTO: 3.53 M/UL (ref 4.7–6.1)
SODIUM SERPL-SCNC: 137 MMOL/L (ref 135–145)
WBC # BLD AUTO: 10.5 K/UL (ref 4.8–10.8)

## 2025-04-22 PROCEDURE — 83735 ASSAY OF MAGNESIUM: CPT

## 2025-04-22 PROCEDURE — 97602 WOUND(S) CARE NON-SELECTIVE: CPT

## 2025-04-22 PROCEDURE — A9270 NON-COVERED ITEM OR SERVICE: HCPCS | Performed by: INTERNAL MEDICINE

## 2025-04-22 PROCEDURE — 80162 ASSAY OF DIGOXIN TOTAL: CPT

## 2025-04-22 PROCEDURE — 770020 HCHG ROOM/CARE - TELE (206)

## 2025-04-22 PROCEDURE — 700102 HCHG RX REV CODE 250 W/ 637 OVERRIDE(OP): Performed by: STUDENT IN AN ORGANIZED HEALTH CARE EDUCATION/TRAINING PROGRAM

## 2025-04-22 PROCEDURE — 84100 ASSAY OF PHOSPHORUS: CPT

## 2025-04-22 PROCEDURE — A9270 NON-COVERED ITEM OR SERVICE: HCPCS | Performed by: STUDENT IN AN ORGANIZED HEALTH CARE EDUCATION/TRAINING PROGRAM

## 2025-04-22 PROCEDURE — 700102 HCHG RX REV CODE 250 W/ 637 OVERRIDE(OP): Performed by: HOSPITALIST

## 2025-04-22 PROCEDURE — A9270 NON-COVERED ITEM OR SERVICE: HCPCS | Performed by: HOSPITALIST

## 2025-04-22 PROCEDURE — 700102 HCHG RX REV CODE 250 W/ 637 OVERRIDE(OP): Performed by: INTERNAL MEDICINE

## 2025-04-22 PROCEDURE — 700102 HCHG RX REV CODE 250 W/ 637 OVERRIDE(OP)

## 2025-04-22 PROCEDURE — 36415 COLL VENOUS BLD VENIPUNCTURE: CPT

## 2025-04-22 PROCEDURE — 99233 SBSQ HOSP IP/OBS HIGH 50: CPT | Performed by: INTERNAL MEDICINE

## 2025-04-22 PROCEDURE — 85027 COMPLETE CBC AUTOMATED: CPT

## 2025-04-22 PROCEDURE — 80048 BASIC METABOLIC PNL TOTAL CA: CPT

## 2025-04-22 RX ORDER — BACITRACIN ZINC 500 [USP'U]/G
OINTMENT TOPICAL 2 TIMES DAILY
Status: DISCONTINUED | OUTPATIENT
Start: 2025-04-22 | End: 2025-04-23 | Stop reason: HOSPADM

## 2025-04-22 RX ADMIN — ACETAMINOPHEN 1000 MG: 500 TABLET, FILM COATED ORAL at 05:14

## 2025-04-22 RX ADMIN — PRAVASTATIN SODIUM 40 MG: 20 TABLET ORAL at 21:41

## 2025-04-22 RX ADMIN — RASAGILINE 0.5 MG: 1 TABLET ORAL at 05:15

## 2025-04-22 RX ADMIN — Medication 5 MG: at 21:42

## 2025-04-22 RX ADMIN — DIGOXIN 125 MCG: 0.12 TABLET ORAL at 17:57

## 2025-04-22 RX ADMIN — ROTIGOTINE 8 MG: 8 PATCH, EXTENDED RELEASE TRANSDERMAL at 05:16

## 2025-04-22 RX ADMIN — APIXABAN 5 MG: 5 TABLET, FILM COATED ORAL at 17:57

## 2025-04-22 RX ADMIN — BACITRACIN ZINC 1 EACH: 500 OINTMENT TOPICAL at 17:57

## 2025-04-22 RX ADMIN — CARBIDOPA AND LEVODOPA 2 TABLET: 25; 100 TABLET ORAL at 05:15

## 2025-04-22 RX ADMIN — CARBIDOPA AND LEVODOPA 2 TABLET: 25; 100 TABLET ORAL at 17:57

## 2025-04-22 RX ADMIN — CARBIDOPA AND LEVODOPA 2 TABLET: 25; 100 TABLET ORAL at 12:54

## 2025-04-22 RX ADMIN — ASPIRIN 81 MG: 81 TABLET, COATED ORAL at 05:14

## 2025-04-22 RX ADMIN — APIXABAN 5 MG: 5 TABLET, FILM COATED ORAL at 05:15

## 2025-04-22 ASSESSMENT — ENCOUNTER SYMPTOMS
HEADACHES: 0
ABDOMINAL PAIN: 0
CHILLS: 0
FEVER: 0
NAUSEA: 0
BACK PAIN: 0
DIZZINESS: 0
LOSS OF CONSCIOUSNESS: 0
COUGH: 0
VOMITING: 0
DIARRHEA: 0
SHORTNESS OF BREATH: 0
PALPITATIONS: 0

## 2025-04-22 ASSESSMENT — FIBROSIS 4 INDEX: FIB4 SCORE: 2.78

## 2025-04-22 ASSESSMENT — PAIN DESCRIPTION - PAIN TYPE
TYPE: ACUTE PAIN;SURGICAL PAIN
TYPE: ACUTE PAIN

## 2025-04-22 NOTE — DISCHARGE PLANNING
Care Transition Team Discharge Planning    Anticipated Discharge Information  Discharge Disposition: D/T to SNF with Medicare cert in anticipation of skilled care (03)  Discharge Address: [address] [place], [place] [address] [place] [address]  Discharge Contact Phone Number: [phone]    Discharge Plan: Updated by DPA that [facility] swing bed can take patient tomorrow. Updated patient and spouse at bedside that 1000 transport will be requested for 4/23. Discussed transport with [facility] through medicare vs getting a quite from [facility]. Spouse requesting to try [facility] first. [facility] order submitted. MD, charge RN and bedside RN updated of 1000 requested time 4/23.    1415: Received ABN for potential cost from [facility] transport to [facility]. Presented to patient and spouse and they are interested in self pay quote from [facility] or [facility] instead. Requested DPA to get quote. Requested Evelin to cancel [facility] request.

## 2025-04-22 NOTE — DISCHARGE PLANNING
DC Transport Scheduled    Transport Company Scheduled:  [employer]  Spoke with Mika at [facility] to schedule transport.    Scheduled Date: 4/23/20225  Scheduled Time: ***    Transport Type: [facility]  Destination:   Healthcare Facility   Destination Name: [facility]  Destination address: [address] [place], [place] [id number]    Notified care team of scheduled transport via [employer].     If there are any changes needed to the DC transportation scheduled, please contact [facility] at ext. [phone] between the hours of 3612-4947. If outside those hours, contact the ED Case Manager at ext. [phone].

## 2025-04-22 NOTE — PROGRESS NOTES
Assumed care of patient at 1845. Bedside report received from day RN. Assessment complete.  AA&Ox4 Denies CP/SOB.  Reporting 2/10 pain. Medicated per MAR.   Educated patient regarding pharmacologic and non pharmacologic modalities for pain management.  Skin per flowsheets  Tolerating Reg diet. Denies N/V.  Last BM 4/21  Pt ambulates x1 assist with a cane.  All needs met at this time. Call light within reach. Pt calls appropriately. Bed low and locked, non skid socks in place. Hourly rounding in place.

## 2025-04-22 NOTE — PROGRESS NOTES
Notified MD patients BP 84/54, 80/50, 82/53 HR  A fib on monitor, patient denies any lightheadedness, asymptomatic. No new orders at this time, continuing with POC.  Wife at bedside, all questions answered at this time. Hourly rounding in place.     Bedside report received from off going RN/tech: NOC, assumed care of patient.     Fall Risk Score: HIGH RISK  Fall risk interventions in place: Place yellow fall risk ID band on patient, Provide patient/family education based on risk assessment, Educate patient/family to call staff for assistance when getting out of bed, Place fall precaution signage outside patient door, Place patient in room close to nursing station, Utilize bed/chair fall alarm, and Bed alarm connected correctly  Bed type: Regular (Micah Score less than 17 interventions in place)  Patient on cardiac monitor: Yes  IVF/IV medications: Not Applicable   Oxygen: Room Air  Bedside sitter: Not Applicable   Isolation: Not applicable

## 2025-04-22 NOTE — CARE PLAN
The patient is Stable - Low risk of patient condition declining or worsening    Shift Goals  Clinical Goals: Pt will report a tolerable pain level to sleep comfortably.  Patient Goals: Rest  Family Goals: JOSE    Progress made toward(s) clinical / shift goals:    Problem: Pain - Standard  Goal: Alleviation of pain or a reduction in pain to the patient’s comfort goal  Outcome: Progressing  Pt reports minimal pain in shoulder and hand. Relieved by scheduled tylenol.      Problem: Fall Risk  Goal: Patient will remain free from falls  Outcome: Progressing  Bed alarm is in place and pt calls appropriately.        Patient is not progressing towards the following goals:

## 2025-04-22 NOTE — PROGRESS NOTES
Monitor Summary  Rhythm: Afib  Rate:   Ectopy: Occasional PVC, rare couplet  Measurements: --/0.10/--  ---12 hr Chart Review---

## 2025-04-22 NOTE — PROGRESS NOTES
Hospital Medicine Daily Progress Note    Date of Service  4/22/2025    Chief Complaint  Osman Choi is a 80 y.o. male admitted 4/16/2025 with hypotension    Hospital Course  80 y.o. male who presented 4/16/2025 with Parkinson's, mod/severe AS, AFib on apixaban, ETOH use with a recent shoulder Fx s/p reverse arthroplasty 4/13-14.  Pt had been recovering at [facility] when he developed hypotension and was sent to us for evaluation given his hewly Dx'd severe aortic stenosis.  Here pt was admitted to the ICU and given fluid resuscitation as well as Digoxin IV load.  He has had significant issues with agitation and delerium requiring precedex drip.  Mentation improved.    Echocardiogram showed ejection fraction 75%.  Diastolic function difficult to assess with arrhythmia.  Unable to assess aortic valve stenosis.    Cardiology was consulted.  Based on his structural heart disease it was recommended patient discontinue his outpatient propafenone, also did not feel he will do well with amiodarone.  Metoprolol For blood pressure too much.  The patient was started on digoxin.  The plan was for outpatient TAVR evaluation.    Interval Problem Update  Pt O though mildly confused.  Anxious to get out of bed but no other complaints    Dw family at the bedside and on the phone  DW Cardiology      4/20 patient is new to me today, he is in bed, family is at bedside, he is alert oriented follows commands, shoulder pain is improved and he is having well-controlled with Tylenol, I discussed with previous hospitalist from [facility], I reviewed note from cardiology reviewed note from critical care, patient is on Eliquis we will monitor for side effects, discussed with bedside nurse charge nurse  pharmacist, heart rate is better controlled, blood pressure is improved, continue digoxin.  4/21 patient is resting in chair, patient is alert oriented follows commands, family is at bedside, he is in good spirit, he worked with  Occup[employer] today, denies any chest pain palpitations, discussed with bedside nurse charge nurse  pharmacist    Patient was seen and examined at bedside.  I have personally reviewed and interpreted vitals, labs, and imaging.    4/22.  Afebrile.  Intermittent tachycardia.  Hypotensive this morning.  On room air.  Denies fever, chills, chest pains, shortness of breath, dizziness/lightheadedness, palpitations.  Complains of left shoulder pain.  Blood pressure was soft this morning.  Patient was asymptomatic, not acidotic on BMP.  Check digoxin level.  Hgb 10.7    I have discussed this patient's plan of care and discharge plan at IDT rounds today with Case Management, Nursing, Nursing leadership, and other members of the IDT team.    Consultants/Specialty  cardiology    Code Status  Full Code    Disposition  The patient is not medically cleared for discharge to home or a post-acute facility.  Anticipate discharge to: an inpatient rehabilitation hospital    I have placed the appropriate orders for post-discharge needs.    Review of Systems  Review of Systems   Constitutional:  Negative for chills and fever.   Respiratory:  Negative for cough and shortness of breath.    Cardiovascular:  Negative for chest pain, palpitations and leg swelling.   Gastrointestinal:  Negative for abdominal pain, diarrhea, nausea and vomiting.   Genitourinary:  Negative for dysuria and urgency.   Musculoskeletal:  Negative for back pain.        L shoulder pain   Skin:  Negative for rash.   Neurological:  Negative for dizziness, loss of consciousness and headaches.        Physical Exam  Temp:  [36.2 °C (97.1 °F)-36.6 °C (97.9 °F)] 36.6 °C (97.9 °F)  Pulse:  [] 92  Resp:  [17-18] 18  BP: ()/(54-72) 87/56  SpO2:  [91 %-98 %] 95 %    Physical Exam  Constitutional:       General: He is not in acute distress.     Appearance: He is well-developed. He is not diaphoretic.   HENT:      Head: Normocephalic and atraumatic.    Eyes:      Conjunctiva/sclera: Conjunctivae normal.   Neck:      Vascular: No JVD.   Cardiovascular:      Rate and Rhythm: Normal rate. Rhythm irregular.      Heart sounds: No murmur heard.     No gallop.   Pulmonary:      Effort: Pulmonary effort is normal. No respiratory distress.      Breath sounds: No stridor. No wheezing or rales.   Abdominal:      Palpations: Abdomen is soft.      Tenderness: There is no abdominal tenderness. There is no guarding or rebound.   Musculoskeletal:      Right lower leg: No edema.      Left lower leg: No edema.      Comments: L arm in a sling with post op dressings in place   Skin:     General: Skin is warm and dry.      Capillary Refill: Capillary refill takes less than 2 seconds.      Findings: No rash.   Neurological:      Mental Status: He is oriented to person, place, and time.   Psychiatric:         Mood and Affect: Mood normal.         Behavior: Behavior normal.         Thought Content: Thought content normal.         Fluids    Intake/Output Summary (Last 24 hours) at 4/22/2025 1033  Last data filed at 4/22/2025 0900  Gross per 24 hour   Intake 520 ml   Output 450 ml   Net 70 ml        Laboratory        Recent Labs     04/20/25  0147 04/21/25  0057   SODIUM 139 137   POTASSIUM 3.8 3.9   CHLORIDE 106 105   CO2 22 22   GLUCOSE 103* 98   BUN 18 16   CREATININE 0.61 0.63   CALCIUM 7.9* 8.2*                   Imaging  EC-ECHOCARDIOGRAM COMPLETE W/O CONT   Final Result      EC-ECHOCARDIOGRAM COMPLETE W/ CONT         DX-CHEST-LIMITED (1 VIEW)   Final Result         1.  Bilateral basilar atelectasis, no focal infiltrate   2.  Cardiomegaly           Assessment/Plan  * Nonrheumatic aortic valve stenosis- (present on admission)  Assessment & Plan  4/22/2025  Severe based on TTE:  Has been referred for outpt evaluation for TAVR    Follow-up with cardiology as outpatient for TAVR procedure    Acute metabolic encephalopathy- (present on admission)  Assessment & Plan  4/22/2025  Mental  status is markedly improved today  Cont to minimize sedating medications  Start scheduled APAP and qhs melatonin    Improved back to baseline    HLD (hyperlipidemia)- (present on admission)  Assessment & Plan  4/22/2025  Monitoring    Hypotension- (present on admission)  Assessment & Plan  4/22/2025  Thought to be due to vol vs cardiogenic vs less likely septic  In setting of severe aortic stenosis    Improving, continue monitoring    Shoulder fracture- (present on admission)  Assessment & Plan  4/22/2025  Status post SHOULDER REVERSE ARTHROPLASTY TOTAL REPLACEMENT, [place] ARTHROPLASTY GLENOHUMERAL JOINT TOTAL SHOULDER on 4/14/25..  Continue pain management  Will need follow-up with orthopedic surgery as outpatient.  Continue sling for comfort    Atrial fibrillation (HCC)- (present on admission)  Assessment & Plan  4/22/2025  On apixaban  Had been on propafenone as an outpt: structural cardiac team recommend stopping  Based on underlying structural heart disease Cards feel unlikely to do well with amio  Has had hypotension with metoprolol  Now s/p IV Digoxin load: cont 125mcg daily  Unclear how much of his tachycardic events are driven by external issues (agitation/anxiety)    Continue digoxin  Avoid beta-blockers since this can cause hypotension on this patient    Better control with digoxin    Parkinson's disease with dyskinesia without fluctuating manifestations (HCC)- (present on admission)  Assessment & Plan  4/22/2025  Cont home regimen of   -sinemet  -rotiglotine         VTE prophylaxis: Eliquis    I have performed a physical exam and reviewed and updated ROS and Plan today (4/22/2025). In review of yesterday's note (4/21/2025), there are no changes except as documented above.    Greater than 50 minutes spent prepping to see patient (e.g. review of tests) obtaining and/or reviewing separately obtained history. Performing a medically appropriate examination and/ evaluation.  Counseling and educating the  patient/family/caregiver.  Ordering medications, tests, or procedures.  Referring and communicating with other health care professionals.  Documenting clinical information in EPIC.  Independently interpreting results and communicating results to patient/family/caregiver.  Care coordination.

## 2025-04-22 NOTE — WOUND TEAM
[facility]  Inpatient Services  Wound and Skin Care Brief Evaluation    Admission Date: 4/16/2025     Last order of IP CONSULT TO WOUND CARE was found on 4/22/2025 from Hospital Encounter on 4/15/2025     HPI, PMH, SH: Reviewed    No chief complaint on file.    Diagnosis: Severe aortic stenosis [I35.0]    Unit where seen by Wound Team: T822/00     Wound consult placed regarding interdigit of the index finger and thumb of left hand. Chart and images reviewed. This discussed with bedside RN, Alis. This clinician in to assess patient. Patient pleasant and agreeable. Patient sitting in chair, was able to stand with assistance. Non-selectively debrided with Normal Saline and Gauze.     No pressure injuries or advanced wound care needs identified. Interdigit of thumb and index finger appear to have a linear blister, blanching throughout, likely friction related.  Abx ointment ordered for nursing to apply BID.  Patient and wife had concerns regarding discharge planning.  Updated bedside RN, Alis for wound care and also the POC for discharge.  Wound consult completed. No further follow up unless indicated and consulted.                PREVENTATIVE INTERVENTIONS:    Q shift Micah - performed per nursing policy  Q shift pressure point assessments - performed per nursing policy    Surface/Positioning  Standard/trauma mattress - Currently in Place    Offloading/Redistribution  Heel offloading dressing (Silicone dressing) - Currently in Place      Respiratory  Silicone O2 tubing - Currently in Place  Gray Foam Ear protectors - Currently in Place    Containment/Moisture Prevention    Dri-branden pad - Currently in Place    Mobilization      Up to chair

## 2025-04-23 VITALS
HEIGHT: 70 IN | RESPIRATION RATE: 18 BRPM | TEMPERATURE: 98.6 F | HEART RATE: 79 BPM | SYSTOLIC BLOOD PRESSURE: 108 MMHG | WEIGHT: 186.07 LBS | BODY MASS INDEX: 26.64 KG/M2 | DIASTOLIC BLOOD PRESSURE: 72 MMHG | OXYGEN SATURATION: 93 %

## 2025-04-23 PROBLEM — I95.9 HYPOTENSION: Status: RESOLVED | Noted: 2025-04-15 | Resolved: 2025-04-23

## 2025-04-23 PROBLEM — G93.41 ACUTE METABOLIC ENCEPHALOPATHY: Status: RESOLVED | Noted: 2025-04-18 | Resolved: 2025-04-23

## 2025-04-23 PROCEDURE — A9270 NON-COVERED ITEM OR SERVICE: HCPCS | Performed by: INTERNAL MEDICINE

## 2025-04-23 PROCEDURE — A9270 NON-COVERED ITEM OR SERVICE: HCPCS | Performed by: HOSPITALIST

## 2025-04-23 PROCEDURE — 700102 HCHG RX REV CODE 250 W/ 637 OVERRIDE(OP): Performed by: STUDENT IN AN ORGANIZED HEALTH CARE EDUCATION/TRAINING PROGRAM

## 2025-04-23 PROCEDURE — 700102 HCHG RX REV CODE 250 W/ 637 OVERRIDE(OP)

## 2025-04-23 PROCEDURE — 700102 HCHG RX REV CODE 250 W/ 637 OVERRIDE(OP): Performed by: INTERNAL MEDICINE

## 2025-04-23 PROCEDURE — 700102 HCHG RX REV CODE 250 W/ 637 OVERRIDE(OP): Performed by: HOSPITALIST

## 2025-04-23 PROCEDURE — 99239 HOSP IP/OBS DSCHRG MGMT >30: CPT | Performed by: INTERNAL MEDICINE

## 2025-04-23 PROCEDURE — A9270 NON-COVERED ITEM OR SERVICE: HCPCS | Performed by: STUDENT IN AN ORGANIZED HEALTH CARE EDUCATION/TRAINING PROGRAM

## 2025-04-23 RX ORDER — DIGOXIN 125 MCG
125 TABLET ORAL DAILY
Status: SHIPPED
Start: 2025-04-23

## 2025-04-23 RX ORDER — ACETAMINOPHEN 500 MG
1000 TABLET ORAL EVERY 6 HOURS PRN
Status: SHIPPED
Start: 2025-04-23

## 2025-04-23 RX ADMIN — RASAGILINE 0.5 MG: 1 TABLET ORAL at 04:30

## 2025-04-23 RX ADMIN — ROTIGOTINE 8 MG: 8 PATCH, EXTENDED RELEASE TRANSDERMAL at 04:30

## 2025-04-23 RX ADMIN — ASPIRIN 81 MG: 81 TABLET, COATED ORAL at 04:30

## 2025-04-23 RX ADMIN — APIXABAN 5 MG: 5 TABLET, FILM COATED ORAL at 04:30

## 2025-04-23 RX ADMIN — BACITRACIN ZINC: 500 OINTMENT TOPICAL at 04:30

## 2025-04-23 RX ADMIN — CARBIDOPA AND LEVODOPA 2 TABLET: 25; 100 TABLET ORAL at 04:30

## 2025-04-23 RX ADMIN — ACETAMINOPHEN 1000 MG: 500 TABLET, FILM COATED ORAL at 10:13

## 2025-04-23 ASSESSMENT — FIBROSIS 4 INDEX: FIB4 SCORE: 2.2

## 2025-04-23 NOTE — CARE PLAN
The patient is Stable - Low risk of patient condition declining or worsening    Shift Goals  Clinical Goals: Monitor labs, VSS, BP, pain control, d/c planning  Patient Goals: Rest  Family Goals: At bedside, updates    Progress made toward(s) clinical / shift goals:    Problem: Knowledge Deficit - Standard  Goal: Patient and family/care givers will demonstrate understanding of plan of care, disease process/condition, diagnostic tests and medications  Outcome: Progressing     Problem: Pain - Standard  Goal: Alleviation of pain or a reduction in pain to the patient’s comfort goal  Outcome: Progressing     Problem: Fall Risk  Goal: Patient will remain free from falls  Outcome: Progressing       Patient is not progressing towards the following goals:

## 2025-04-23 NOTE — PROGRESS NOTES
Monitor Summary  Rhythm: Atrial Fibrillation  Rate:   Ectopy: PVCs, Couplets  .- / .08 / .-            12 hr Chart check.

## 2025-04-23 NOTE — DISCHARGE PLANNING
Care Transition Team Discharge Planning    Anticipated Discharge Information  Discharge Disposition: D/T to SNF with Medicare cert in anticipation of skilled care (03)  Discharge Address: [address] [place] [address] [place] [address]  Discharge Contact Phone Number: [phone]    Discharge Plan:  Patient discussed in AM rounds. Transport set up for [facility] at 1130 via self pay. [employer] complete and in CM office.

## 2025-04-23 NOTE — CARE PLAN
The patient is Watcher - Medium risk of patient condition declining or worsening    Shift Goals  Clinical Goals: pain management, VSS, swelling in left arm  Patient Goals: go home  Family Goals: michelle    Progress made toward(s) clinical / shift goals:    Problem: Knowledge Deficit - Standard  Goal: Patient and family/care givers will demonstrate understanding of plan of care, disease process/condition, diagnostic tests and medications  Outcome: Progressing  Note: Discussed plan of care, pt able to actively participate in the learning process and demonstrates understanding by return demonstration or return explanation. Improved ability to communicate regarding their healthcare and current admission. Improved ability to identify barriers to learning and care.       Problem: Fall Risk  Goal: Patient will remain free from falls  Outcome: Progressing  Note: Fall prevention measures in place, bed alarm on and connected correctly, call light within reach, hourly rounding, Education provided on fall prevention. Pt instructed to use call light prior to exiting the bed, educated on use of bed alarms, and risks and complications related to falls. Medication orders evaluated for fall risk, gait/mobility assessed, sensory deficits assessed, mental status assessed, assessed for hypotension, hypovolemia, weakness, fatigue, elimination, and confusion.         Patient is not progressing towards the following goals:

## 2025-04-23 NOTE — DISCHARGE SUMMARY
Discharge Summary    CHIEF COMPLAINT ON ADMISSION  No chief complaint on file.      Reason for Admission  Cardiology (aortic valve stenosis)     Admission Date  4/16/2025    CODE STATUS  Full Code    HPI & HOSPITAL COURSE  80 y.o. male who presented 4/16/2025 with Parkinson's, mod/severe AS, AFib on apixaban, ETOH use with a recent shoulder Fx s/p reverse arthroplasty 4/13-14.  Pt had been recovering at [facility] when he developed hypotension and was sent to us for evaluation given his hewly Dx'd severe aortic stenosis.  Here pt was admitted to the ICU and given fluid resuscitation as well as Digoxin IV load.  He has had significant issues with agitation and delerium requiring precedex drip.  Mentation improved.    Echocardiogram showed ejection fraction 75%.  Diastolic function difficult to assess with arrhythmia.  Unable to assess aortic valve stenosis.    Cardiology was consulted.  Based on his structural heart disease it was recommended patient discontinue his outpatient propafenone, also did not feel he will do well with amiodarone.  The patient became hypotensive and metoprolol.  The patient was started on digoxin and was found to have therapeutic serum levels the day prior to discharge.  The plan was for outpatient TAVR evaluation.    The patient is medically stable to discharge back to [facility] inpatient rehab to recover from his shoulder surgery.  He can follow-up with his primary care and cardiology outpatient for TAVR evaluation.    Therefore, he is discharged in fair and stable condition to an inpatient rehabilitation hospital.    The patient met 2-midnight criteria for an inpatient stay at the time of discharge.    Discharge Date  4/23/2025      FOLLOW UP ITEMS POST DISCHARGE  None    DISCHARGE DIAGNOSES  Principal Problem:    Nonrheumatic aortic valve stenosis (POA: Yes)  Active Problems:    Parkinson's disease with dyskinesia without fluctuating manifestations (HCC) (POA: Yes)    Atrial  fibrillation (HCC) (POA: Yes)    Shoulder fracture (POA: Yes)    HLD (hyperlipidemia) (POA: Yes)  Resolved Problems:    Hypotension (POA: Yes)    Acute metabolic encephalopathy (POA: Yes)      FOLLOW UP  Future Appointments   Date Time Provider Department Center   5/8/2025  9:30 AM Wes Hoskins M.D. [facility] None   5/14/2025  8:40 AM Pritesh Mead D.O. [facility] None   8/14/2025 11:20 AM Adonay Olson D.O. [facility] None   12/4/2025 11:20 AM ALDAIR Chavez None     Durga Anderson M.D.  [address]  [facility] [address]  [phone]    Follow up        MEDICATIONS ON DISCHARGE     Medication List        START taking these medications        Instructions   acetaminophen 500 MG Tabs  Commonly known as: Tylenol   Take 2 Tablets by mouth every 6 hours as needed for Mild Pain.  Dose: 1,000 mg     digoxin 125 MCG Tabs  Commonly known as: Lanoxin   Take 1 Tablet by mouth every day at 6 PM.  Dose: 125 mcg     melatonin 5 MG Tabs   Take 1 Tablet by mouth every evening.  Dose: 5 mg            CONTINUE taking these medications        Instructions   alendronate 70 MG Tabs  Commonly known as: Fosamax   Take 70 mg by mouth every 7 days.  Dose: 70 mg     aspirin 81 MG EC tablet   Take 81 mg by mouth every day.  Dose: 81 mg     B-12 1000 MCG Tabs   Take 1 Tablet by mouth every day.  Dose: 1 Tablet     carbidopa-levodopa  MG Tabs  Commonly known as: Sinemet   Take 2 Tablets by mouth 3 times a day. For Parkinson's  Dose: 2 Tablet     D3 2000 PO   Take 1 Tablet by mouth every day.  Dose: 1 Tablet     Eliquis 5mg Tabs  Generic drug: apixaban   Take 5 mg by mouth 2 times a day.  Dose: 5 mg     Neupro 8 MG/24HR Pt24  Generic drug: Rotigotine   Place 8 mg on the skin every day. For Parkinson's  Dose: 8 mg     pravastatin 40 MG tablet  Commonly known as: Pravachol   Take 40 mg by mouth every evening.  Dose: 40 mg     Rasagiline Mesylate 0.5 MG Tabs   Take 1 Tablet by mouth every day. For Parkinson's  Dose: 1 Tablet             STOP taking these medications      propafenone 150 MG Tabs  Commonly known as: Rythmol              Allergies  No Known Allergies    DIET  Orders Placed This Encounter   Procedures    Diet Order Diet: Regular     Standing Status:   Standing     Number of Occurrences:   1     Diet::   Regular [1]       ACTIVITY  As tolerated.  Weight bearing as tolerated    CONSULTATIONS  Cardiology    PROCEDURES  None    LABORATORY  Lab Results   Component Value Date    SODIUM 137 04/22/2025    POTASSIUM 4.1 04/22/2025    CHLORIDE 104 04/22/2025    CO2 20 04/22/2025    GLUCOSE 130 (H) 04/22/2025    BUN 17 04/22/2025    CREATININE 0.84 04/22/2025        Lab Results   Component Value Date    WBC 10.5 04/22/2025    HEMOGLOBIN 10.7 (L) 04/22/2025    HEMATOCRIT 33.2 (L) 04/22/2025    PLATELETCT 281 04/22/2025        I discussed medications and side effects with the patient.  I discussed prognosis and importance of medical compliance with the patient.  I counseled the patient about diet, exercise, weight loss, smoking cessation, and life style modifications.  All questions and concerns have been addressed.  Total time of the discharge process was 37 minutes.

## 2025-04-23 NOTE — PROGRESS NOTES
Patient discharged to [facility] in stable condition. Report called to Sofia ESCALANTE. PIV removed, catheter tip intact. Tele box removed, monitor room notified. Discharge education provided to patient and spouse re: medications, follow-up appointments, and s/sx of worsening condition; all questions answered. Discharged by [facility] via wheelchair.

## 2025-04-23 NOTE — DISCHARGE PLANNING
-1922  DPA confirmed with Karen at [facility] that facility is OK with 1130 transport from [facility]. This is the latest time that facility can accommodate because their admitting MD is off at 1400.     [phone]  DPA faxed DC Summary to [facility].

## 2025-04-23 NOTE — PROGRESS NOTES
Bedside report received from off going RN/tech: Stacia, assumed care of patient.     Fall Risk Score: HIGH RISK  Fall risk interventions in place: Place yellow fall risk ID band on patient, Provide patient/family education based on risk assessment, Educate patient/family to call staff for assistance when getting out of bed, Place fall precaution signage outside patient door, Place patient in room close to nursing station, Utilize bed/chair fall alarm, Notify charge of high risk for huddle, and Bed alarm connected correctly  Bed type: Regular (Micah Score less than 17 interventions in place)  Patient on cardiac monitor: Yes  IVF/IV medications: Not Applicable   Oxygen: Room Air and No oxygen tank in room  Bedside sitter: Not Applicable   Isolation: Not applicable

## 2025-04-23 NOTE — PROGRESS NOTES
Bedside report received from off going RN/tech: Alis, assumed care of patient.     Fall Risk Score: HIGH RISK  Fall risk interventions in place: Place yellow fall risk ID band on patient, Provide patient/family education based on risk assessment, Educate patient/family to call staff for assistance when getting out of bed, Place fall precaution signage outside patient door, Place patient in room close to nursing station, Utilize bed/chair fall alarm, and Bed alarm connected correctly  Bed type: Regular (Micah Score less than 17 interventions in place)  Patient on cardiac monitor: Yes  IVF/IV medications: Not Applicable   Oxygen: Room Air  Bedside sitter: Not Applicable   Isolation: Not applicable

## 2025-04-23 NOTE — DISCHARGE INSTRUCTIONS
Discharge Instructions per TALYA GarrisonO.    DIET: Diet Order Diet: Regular    ACTIVITY: As tolerated    A proper diet that is low in grease, fat, and salt, along with 30 minutes of exercise per day will lead to weight loss, and better controlled blood sugar and blood pressure.    DIAGNOSIS: Nonrheumatic aortic valve stenosis    Follow up with your Primary Care Provider Durga Anderson M.D. as scheduled or sooner if your symptoms persist or worsen.  Return to Emergency Room for sever chest pain, shortness of breath, signs of a stroke, or any other emergencies.

## 2025-04-24 ENCOUNTER — TELEPHONE (OUTPATIENT)
Dept: CARDIOLOGY | Facility: MEDICAL CENTER | Age: 80
End: 2025-04-24
Payer: MEDICARE

## 2025-04-24 NOTE — TELEPHONE ENCOUNTER
Referral from: Dr. Michael Rose for AS. Echo done at [facility] 4/15/2025 showing sev AS, but unable to duplicate at [facility] 4/16/2025.     Called [facility] filmroom and requested images to be pushed. Confirmed images pushed over.

## 2025-04-25 ENCOUNTER — HOSPITAL ENCOUNTER (OUTPATIENT)
Dept: RADIOLOGY | Facility: MEDICAL CENTER | Age: 80
End: 2025-04-25
Attending: HOSPITALIST

## 2025-04-25 NOTE — TELEPHONE ENCOUNTER
Called OP filmroom and spoke with Radha. Confirmed echo images received and she will push into Epic.

## 2025-04-28 NOTE — TELEPHONE ENCOUNTER
Margarito Cutler M.D.  You25 minutes ago (10:16 AM)     He was super tachycardic during the exam. Why dont we repeat a limited echo to look at aortic valve.     You  Margarito Cutler M.D.3 days ago     Please review echo from [facility] 4/15 and then repeated at [facility] 4/16 and advise POC regarding AS - was discharged to [facility]. Thanks!

## 2025-05-08 ENCOUNTER — TELEPHONE (OUTPATIENT)
Dept: CARDIOLOGY | Facility: MEDICAL CENTER | Age: 80
End: 2025-05-08
Payer: MEDICARE

## 2025-05-08 ENCOUNTER — APPOINTMENT (OUTPATIENT)
Dept: NEUROLOGY | Facility: MEDICAL CENTER | Age: 80
End: 2025-05-08
Attending: INTERNAL MEDICINE
Payer: MEDICARE

## 2025-05-08 NOTE — TELEPHONE ENCOUNTER
Called patient in regards to records for NP appointment with Dr. Mead . To review most recent lab results, ekg, any cardiac testing or ,if patient has been treated by a cardiologist. No answer, left voicemail to call back

## 2025-05-09 NOTE — TELEPHONE ENCOUNTER
BD     Caller: Navya Whiteside - spouse     Topic/issue: Patient's previous cardiologist was a Dr Jose D Telles in [place], [place] at [facility] (phone number: [phone]).    His local PCP is in [place] by the name of Dr Durga Anderson (phone number: [phone]).    Patient was in the [facility] from 4/16-4/23 and all his recent cardiac testing and labs were done there. Navya would like BD to review if possible.     Callback Number: [phone]    Thank you,  Nohemy VENEGAS

## 2025-05-12 ENCOUNTER — TELEPHONE (OUTPATIENT)
Dept: CARDIOLOGY | Facility: MEDICAL CENTER | Age: 80
End: 2025-05-12
Payer: MEDICARE

## 2025-05-12 NOTE — TELEPHONE ENCOUNTER
----- Message from Medical Assistant Nahomy BEASLEY Med Ass't ----    TO ADD RN    ----- Message -----  From: Maira Chaves  Sent: 2025   5:09 PM PDT  To: Cristi Acosta  Subject: After Hours Call                                 PATIENT NAME:Osman Choi  CALLER NAME:Navya  REASON FOR CALL: Navya is returning call made earlier today, 25. Shw also wanted to inform staff that Maxim was in [facility] starting 4/15, please read notes for further information medical team is requesting.  PHONE NUMBER: [phone]  PROVIDER: Dr Mead  : 49  MRN: [id number]  ADVISED 1-2 BUSINESS DAYS FOR CALL BACK Y/N: N

## 2025-05-14 ENCOUNTER — TELEPHONE (OUTPATIENT)
Dept: CARDIOLOGY | Facility: MEDICAL CENTER | Age: 80
End: 2025-05-14

## 2025-05-14 ENCOUNTER — OFFICE VISIT (OUTPATIENT)
Dept: CARDIOLOGY | Facility: MEDICAL CENTER | Age: 80
End: 2025-05-14
Attending: INTERNAL MEDICINE
Payer: MEDICARE

## 2025-05-14 VITALS
RESPIRATION RATE: 18 BRPM | SYSTOLIC BLOOD PRESSURE: 110 MMHG | DIASTOLIC BLOOD PRESSURE: 82 MMHG | OXYGEN SATURATION: 97 % | HEART RATE: 70 BPM | HEIGHT: 70 IN | WEIGHT: 179.4 LBS | BODY MASS INDEX: 25.68 KG/M2

## 2025-05-14 DIAGNOSIS — I48.11 LONGSTANDING PERSISTENT ATRIAL FIBRILLATION (HCC): ICD-10-CM

## 2025-05-14 DIAGNOSIS — D68.69 HYPERCOAGULABLE STATE DUE TO LONGSTANDING PERSISTENT ATRIAL FIBRILLATION (HCC): Primary | ICD-10-CM

## 2025-05-14 DIAGNOSIS — I48.11 HYPERCOAGULABLE STATE DUE TO LONGSTANDING PERSISTENT ATRIAL FIBRILLATION (HCC): Primary | ICD-10-CM

## 2025-05-14 DIAGNOSIS — G20.A1 PARKINSON'S DISEASE, UNSPECIFIED WHETHER DYSKINESIA PRESENT, UNSPECIFIED WHETHER MANIFESTATIONS FLUCTUATE (HCC): ICD-10-CM

## 2025-05-14 DIAGNOSIS — I35.0 NONRHEUMATIC AORTIC VALVE STENOSIS: ICD-10-CM

## 2025-05-14 LAB — EKG IMPRESSION: NORMAL

## 2025-05-14 PROCEDURE — 3079F DIAST BP 80-89 MM HG: CPT | Performed by: INTERNAL MEDICINE

## 2025-05-14 PROCEDURE — G2211 COMPLEX E/M VISIT ADD ON: HCPCS | Performed by: INTERNAL MEDICINE

## 2025-05-14 PROCEDURE — 99204 OFFICE O/P NEW MOD 45 MIN: CPT | Performed by: INTERNAL MEDICINE

## 2025-05-14 PROCEDURE — 93010 ELECTROCARDIOGRAM REPORT: CPT | Performed by: INTERNAL MEDICINE

## 2025-05-14 PROCEDURE — 3074F SYST BP LT 130 MM HG: CPT | Performed by: INTERNAL MEDICINE

## 2025-05-14 PROCEDURE — 99212 OFFICE O/P EST SF 10 MIN: CPT | Performed by: INTERNAL MEDICINE

## 2025-05-14 PROCEDURE — 93005 ELECTROCARDIOGRAM TRACING: CPT | Mod: TC | Performed by: INTERNAL MEDICINE

## 2025-05-14 RX ORDER — NEBIVOLOL 5 MG/1
5 TABLET ORAL DAILY
Qty: 90 TABLET | Refills: 4 | Status: SHIPPED | OUTPATIENT
Start: 2025-05-14

## 2025-05-14 ASSESSMENT — ENCOUNTER SYMPTOMS
VOMITING: 0
SENSORY CHANGE: 0
LOSS OF CONSCIOUSNESS: 0
BLOOD IN STOOL: 0
NAUSEA: 0
FALLS: 0
WEIGHT LOSS: 0
SPEECH CHANGE: 0
CHILLS: 0
SHORTNESS OF BREATH: 0
COUGH: 0
HALLUCINATIONS: 0
ORTHOPNEA: 0
ABDOMINAL PAIN: 0
BRUISES/BLEEDS EASILY: 0
EYE DISCHARGE: 0
PND: 0
CLAUDICATION: 0
DIZZINESS: 0
FEVER: 0
EYE PAIN: 0
PALPITATIONS: 0
HEADACHES: 0
BLURRED VISION: 0
DOUBLE VISION: 0
MYALGIAS: 0
DEPRESSION: 0

## 2025-05-14 ASSESSMENT — FIBROSIS 4 INDEX: FIB4 SCORE: 2.2

## 2025-05-14 NOTE — TELEPHONE ENCOUNTER
Dr. Guevara's OV note faxed to [facility] per patients request. Fax confirmation received and sent to [facility] for scanning.

## 2025-05-14 NOTE — PROGRESS NOTES
Chief Complaint   Patient presents with    Atrial Fibrillation    Hyperlipidemia       Subjective     Maxim Choi is a 80 y.o. male who presents today for cardiac care and evaluation due to abnormal transthoracic echocardiogram with ?AS, atrial fibrillation.    I have independently interpreted and reviewed echocardiogram's actual images with patient which showed normal left ventricular systolic function. No wall motion abnormality. No evidence of pulmonary hypertension. ???? aortic stenosis.    I have personally interpreted EKG today with patient, there is no evidence of acute coronary syndrome, no evidence of prior infarct, normal ME and QT interval, no significant conduction disease. Atrial fibrillation with rate of 101.    He also has baseline parkinson's disease.    At this time, patient is still recovering from his recent shoulder surgery.    Past Medical History[1]  Past Surgical History[2]  History reviewed. No pertinent family history.  Social History     Socioeconomic History    Marital status:      Spouse name: Not on file    Number of children: Not on file    Years of education: Not on file    Highest education level: Not on file   Occupational History    Not on file   Tobacco Use    Smoking status: Never    Smokeless tobacco: Never   Vaping Use    Vaping status: Never Used   Substance and Sexual Activity    Alcohol use: Yes     Alcohol/week: 6.0 oz     Types: 10 Glasses of wine per week    Drug use: Never    Sexual activity: Not on file   Other Topics Concern    Not on file   Social History Narrative    Not on file     Social Drivers of Health     Financial Resource Strain: Not on file   Food Insecurity: Not on file (4/23/2025)   Transportation Needs: No Transportation Needs (4/23/2025)    Received from [employer] (and [employer], [place], and [place] prior to 7/1/2021)    Transportation Needs     Patient needs follow up regarding:: 1   Physical Activity: Not on file  "  Stress: Not on file   Social Connections: Feeling Socially Integrated (7/30/2024)    Received from [employer] (and [facility], [place], and [place] prior to 7/1/2021)    OASIS : Social Isolation     Frequency of experiencing loneliness or isolation: Never   Intimate Partner Violence: Not At Risk (4/16/2025)    Humiliation, Afraid, Rape, and Kick questionnaire     Fear of Current or Ex-Partner: No     Emotionally Abused: No     Physically Abused: No     Sexually Abused: No   Housing Stability: Low Risk  (4/18/2025)    Housing Stability Vital Sign     Unable to Pay for Housing in the Last Year: No     Number of Times Moved in the Last Year: 0     Homeless in the Last Year: No     Allergies[3]  Encounter Medications[4]  Review of Systems   Constitutional:  Negative for chills, fever, malaise/fatigue and weight loss.   HENT:  Negative for ear discharge, ear pain, hearing loss and nosebleeds.    Eyes:  Negative for blurred vision, double vision, pain and discharge.   Respiratory:  Negative for cough and shortness of breath.    Cardiovascular:  Negative for chest pain, palpitations, orthopnea, claudication, leg swelling and PND.   Gastrointestinal:  Negative for abdominal pain, blood in stool, melena, nausea and vomiting.   Genitourinary:  Negative for dysuria and hematuria.   Musculoskeletal:  Negative for falls, joint pain and myalgias.   Skin:  Negative for itching and rash.   Neurological:  Negative for dizziness, sensory change, speech change, loss of consciousness and headaches.   Endo/Heme/Allergies:  Negative for environmental allergies. Does not bruise/bleed easily.   Psychiatric/Behavioral:  Negative for depression, hallucinations and suicidal ideas.               Objective     /82 (BP Location: Left arm, Patient Position: Sitting, BP Cuff Size: Adult)   Pulse 70   Resp 18   Ht 1.778 m (5' 10\")   Wt 81.4 kg (179 lb 6.4 oz)   SpO2 97%   BMI 25.74 kg/m²     Physical " Exam  Vitals and nursing note reviewed.   Constitutional:       General: He is not in acute distress.     Appearance: He is not diaphoretic.   HENT:      Head: Normocephalic and atraumatic.      Right Ear: External ear normal.      Left Ear: External ear normal.      Nose: No congestion or rhinorrhea.   Eyes:      General:         Right eye: No discharge.         Left eye: No discharge.   Neck:      Thyroid: No thyromegaly.      Vascular: No JVD.   Cardiovascular:      Rate and Rhythm: Normal rate. Rhythm irregular.      Pulses: Normal pulses.   Pulmonary:      Effort: No respiratory distress.   Abdominal:      General: There is no distension.      Tenderness: There is no abdominal tenderness.   Musculoskeletal:         General: No swelling or tenderness.      Right lower leg: No edema.      Left lower leg: No edema.   Skin:     General: Skin is warm and dry.   Neurological:      Mental Status: He is alert and oriented to person, place, and time.      Cranial Nerves: No cranial nerve deficit.   Psychiatric:         Behavior: Behavior normal.                Assessment & Plan     1. Hypercoagulable state due to longstanding persistent atrial fibrillation (HCC)        2. Longstanding persistent atrial fibrillation (HCC)  EKG    nebivolol (BYSTOLIC) 5 MG Tab tablet      3. Nonrheumatic aortic valve stenosis        4. Parkinson's disease, unspecified whether dyskinesia present, unspecified whether manifestations fluctuate (HCC)            Medical Decision Making: Today's Assessment/Status/Plan:   At this time, we will wait for him to get more conditioned and heal from his shoulder surgery to determine what needs to be done for his cardiac condition.  Overall, we will consider a transesophageal echocardiogram in the future.  However, I would like him to be more mobile along with conditioned before considering that test.    In the meantime, we will stop digoxin due to potential toxicity in elderly population.  We will  start patient on Bystolic 5 mg p.o. once a day.  His heart rate and blood pressure will be able to tolerate this low-dose.    Continue anticoagulation.  Avoid falls.  I will also stop aspirin no indication for aspirin.    This visit encounter signifies the visit complexity inherent to evaluation and management associated with medical care services that serve as the continuing focal point for all needed health care services and/or with medical care services that are part of ongoing care related to this patient's single, serious condition, complex cardiac condition.    Danitza Guevara M.D.                              [1]   Past Medical History:  Diagnosis Date    Parkinson disease (HCC)    [2]   Past Surgical History:  Procedure Laterality Date    ORIF, SHOULDER     [3] No Known Allergies  [4]   Outpatient Encounter Medications as of 5/14/2025   Medication Sig Dispense Refill    nebivolol (BYSTOLIC) 5 MG Tab tablet Take 1 Tablet by mouth every day. 90 Tablet 4    acetaminophen (TYLENOL) 500 MG Tab Take 2 Tablets by mouth every 6 hours as needed for Mild Pain.      Rotigotine (NEUPRO) 8 MG/24HR PATCH 24 HR Place 8 mg on the skin every day. For Parkinson's 30 Patch 11    Rasagiline Mesylate 0.5 MG Tab Take 1 Tablet by mouth every day. For Parkinson's 90 Tablet 3    carbidopa-levodopa (SINEMET)  MG Tab Take 2 Tablets by mouth 3 times a day. For Parkinson's 540 Tablet 3    apixaban (ELIQUIS) 5mg Tab Take 5 mg by mouth 2 times a day.      pravastatin (PRAVACHOL) 40 MG tablet Take 40 mg by mouth every evening.      alendronate (FOSAMAX) 70 MG Tab Take 70 mg by mouth every 7 days.      Cyanocobalamin (B-12) 1000 MCG Tab Take 1 Tablet by mouth every day.      Cholecalciferol (D3 2000 PO) Take 1 Tablet by mouth every day.      [DISCONTINUED] digoxin (LANOXIN) 125 MCG Tab Take 1 Tablet by mouth every day at 6 PM.      [DISCONTINUED] melatonin 5 MG Tab Take 1 Tablet by mouth every evening.      [DISCONTINUED] aspirin  81 MG EC tablet Take 81 mg by mouth every day.       Facility-Administered Encounter Medications as of 5/14/2025   Medication Dose Route Frequency Provider Last Rate Last Admin    onabotulinum toxin type A (Botox) injection 100 Units  100 Units Injection Q90 DAYS    100 Units at 04/11/25 2328

## 2025-05-27 NOTE — PROGRESS NOTES
Peds/Neuro Ophthalmology:   Wes Hoskins M.D.    Date & Time note created:    5/28/2025   12:27 PM     Referring MD / APRN:  Durga Anderson M.D., No att. providers found    Patient ID:  Name:             Osman Choi   YOB: 1945  Age:                 79 y.o.  male   MRN:               [id number]    Chief Complaint/Reason for Visit:     Other (7 month f.v. for double vision )      History of Present Illness:      Osman Choi is a 78 yo man presenting for follow up evaluation of diplopia in the setting of parkinsons disease    Today Osman reports persistent double vision. The double vision is stable from prior and described as horizontal and binocular. Monocular diplopia has since resolved. He denies any significant fluctuation of the diplopia towards the end of the day or when fatigued. Patient recently injured his shoulder and reports struggles controlling his Afib    As a recap,   He follows with Dr Olson in Neurology     Mr Choi reports developing double vision 4-5 years ago without instigating event. He denies any prior history of double vision. The double vision was gradual in onset and has been constant since onset. It is horizontal at both near and distance and has been stable. Diplopia resolves with closure of either eye. Objects are completely separate. With his prism rx he does not notice diplopia, but will occasionally have break through diplopia with near work. The episodic double vision at near can be blinked away. He denies headaches. He denies any grittiness or pain in the eye, loss of vision or blurred vision     Osman was previously following at the [facility] and [facility] [place] neurology. From chart review, it appears double vision was first reported 3/11/20 however no work up was ever completed. Pt did finally see Dr Jeaneth Nguyen 8/25/22 At [facility] (peds/strab) who's exam demonstrated a moderate non comitant exotropia and non comitant LHT. Alignment testing  demonstrated a LHT 5-6 and XT 14 prism diopters on central gaze. He was recommended  to undergo recess of the RLR with upwards transposition and adj suture. Pt was noted to have a R lower lid lesion which later revealed basal cell carcinoma. He underwent MOHS 11/1/22. CTH     At his initial exam the double vision appeared both binocular and monocular. He demonstrated a mild lagophthalmos and decreased blink rate secondary to his PD. Recommended regular use of AT.Pt additionally demonstrated a large incomitant XT (similar at both distance and near) and LHT,  Despite the significant misalignment he denied nidia double vision. Misalignment has been longstanding since 2020 however patient had never had work up for his double vision in the past. Discussed work up with MRI brain wwo, however pt deferred at that time. CTH 8/31/24 reviewed and negative for acute abnormality       Exam 3/15/2023:  VA OD 20/60 and OS 20/60   IOP OD 16 / IOP OS 13   Healing nicely   No evidence of misdirect lashes  Dendrites on lashes OS  No sign of reoccurrence cancer           Review of Systems:  ROS    Past Medical History:   Past Medical History:   Diagnosis Date    Parkinson disease (HCC)        Past Surgical History:  Past Surgical History:   Procedure Laterality Date    ORIF, SHOULDER         Current Outpatient Medications:  Current Outpatient Medications   Medication Sig Dispense Refill    nebivolol (BYSTOLIC) 5 MG Tab tablet Take 1 Tablet by mouth every day. 90 Tablet 4    acetaminophen (TYLENOL) 500 MG Tab Take 2 Tablets by mouth every 6 hours as needed for Mild Pain.      Rotigotine (NEUPRO) 8 MG/24HR PATCH 24 HR Place 8 mg on the skin every day. For Parkinson's 30 Patch 11    Rasagiline Mesylate 0.5 MG Tab Take 1 Tablet by mouth every day. For Parkinson's 90 Tablet 3    carbidopa-levodopa (SINEMET)  MG Tab Take 2 Tablets by mouth 3 times a day. For Parkinson's 540 Tablet 3    apixaban (ELIQUIS) 5mg Tab Take 5 mg by mouth 2  times a day.      pravastatin (PRAVACHOL) 40 MG tablet Take 40 mg by mouth every evening.      alendronate (FOSAMAX) 70 MG Tab Take 70 mg by mouth every 7 days.      Cyanocobalamin (B-12) 1000 MCG Tab Take 1 Tablet by mouth every day.      Cholecalciferol (D3 2000 PO) Take 1 Tablet by mouth every day.       Current Facility-Administered Medications   Medication Dose Route Frequency Provider Last Rate Last Admin    onabotulinum toxin type A (Botox) injection 100 Units  100 Units Injection Q90 DAYS    100 Units at 04/11/25 1438       Allergies:  No Known Allergies    Family History:  No family history on file.    Social History:  Social History     Socioeconomic History    Marital status:      Spouse name: Not on file    Number of children: Not on file    Years of education: Not on file    Highest education level: Not on file   Occupational History    Not on file   Tobacco Use    Smoking status: Never    Smokeless tobacco: Never   Vaping Use    Vaping status: Never Used   Substance and Sexual Activity    Alcohol use: Yes     Alcohol/week: 6.0 oz     Types: 10 Glasses of wine per week    Drug use: Never    Sexual activity: Not on file   Other Topics Concern    Not on file   Social History Narrative    Not on file     Social Drivers of Health     Financial Resource Strain: Not on file   Food Insecurity: Not on file (4/23/2025)   Transportation Needs: No Transportation Needs (5/15/2025)    Received from [employer] (and [employer] [facility] prior to 7/1/2021)    OASIS : Transportation     Lack of Transportation (Medical): No     Lack of Transportation (Non-Medical): No     Patient Unable or Declines to Respond: No   Physical Activity: Not on file   Stress: Not on file   Social Connections: Feeling Socially Integrated (5/15/2025)    Received from [employer] (and [facility] [employer] [facility] prior to 7/1/2021)    OASIS : Social Isolation      Frequency of experiencing loneliness or isolation: Rarely   Intimate Partner Violence: Not At Risk (4/16/2025)    Humiliation, Afraid, Rape, and Kick questionnaire     Fear of Current or Ex-Partner: No     Emotionally Abused: No     Physically Abused: No     Sexually Abused: No   Housing Stability: Low Risk  (4/18/2025)    Housing Stability Vital Sign     Unable to Pay for Housing in the Last Year: No     Number of Times Moved in the Last Year: 0     Homeless in the Last Year: No          Physical Exam:  Physical Exam    Oriented x 3  Weight/BMI: There is no height or weight on file to calculate BMI.  There were no vitals taken for this visit.    Base Eye Exam       Visual Acuity (Snellen - Linear)         Right Left    Dist cc 20/30 -2 20/40 -2      Correction: Glasses              Tonometry (i-care, 10:18 AM)         Right Left    Pressure 13 11              Pupils         Dark Light Shape React APD    Right 3 2 Round Brisk None    Left 3 2 Round Brisk None              Visual Fields         Right Left     Full Full              Extraocular Movement         Right Left     Full Full              Neuro/Psych       Oriented x3: Yes    Mood/Affect: Normal   Dysarthria  Decreased blink rate  + resting tremor in lower extremities  No lagophthalmos  5/5 orbicularis oculi strength                  Additional Tests       Color         Right Left    Ishihara 1/9 1/9              Stereo       Fly: -    Animals: 0/3    Circles: 0/9                  Strabismus Exam       Method: Alternate cover      Distance Near Near +3DS N Bifocals     XT' 12                 - - - - - -   - - - - - -                      LHT 16 - -  - -  XT 25-30 - -  - -  XT 20          small LHT            - - - - - -   - - - - - -                XT16 18 LHT       Slit Lamp and Fundus Exam       External Exam         Right Left    External Normal Normal              Slit Lamp Exam         Right Left    Lids/Lashes Blepharitis, well healed scar from prior  MOHS on inferior lid Blepharitis    Conjunctiva/Sclera White and quiet White and quiet    Cornea Clear Clear    Anterior Chamber Deep and quiet Deep and quiet    Iris Round and reactive Round and reactive    Lens 2+ Nuclear sclerosis 2+ Nuclear sclerosis              Fundus Exam         Right Left    Disc pink, sharp disc margins pink, sharp disc margins    C/D Ratio 0.6 0.6    Macula Normal Normal    Periphery Choroidal nevus                   Refraction       Wearing Rx         Sphere Cylinder Axis    Right +0.25 +1.00 046    Left +0.25 +1.75 151      Age: 2yrs    Type: SVL              Manifest Refraction (Auto)         Sphere Cylinder Axis    Right -0.75 +1.50 044    Left -0.75 +1.25 157              Final Rx         Horz Prism Vert Prism    Right 8 BI 9 BU    Left 8BI 9 BD      Type: prism distance    Comments: Prism rx to go over distance correction   Total 18 LHT, 16 XT                     Pertinent Lab/Test/Imaging Review:  CT head wo 8/31/24  1. No acute intracranial abnormality.   2. Calvarium is intact.   My read: no acute abnormality. EOM appear similar in size. No brain stem abnormality     Assessment and Plan:     Diplopia  Osman Choi is a 80 yo man with PMH of Parkinsons who presents for evaluation of diplopia    Developed double vision 4-5 years ago without instigating event. Never worked up in [place]. He denies any prior history of double vision or amblyopia. The double vision has been constant and stable since onset. It is horizontal and binocular in all gazes. Current near and distance rx have vertical and horizontal prism. Did not bring distance rx in. Reports prism rx eliminate double vision, except will have intermittent double on near that he is able to blink away    Exam 11/7/24: no APD, VA 20/40+1 OD, 20/60+1 OS PH, 1/9 color plates OU, full CF. EOM full. Alt cover demonstrates a large incomitant XT and large incomitant LHT. Exam difficult due to inattention. 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 Optic nerves appear pink with sharp disc margins. RNFL 90 OD 89 OS    Exam 5/28/25: no APD, VA 20/30-2 OD 20/40-2 OS, 1/9 color plates. Full CF. EOM full. Large incomitant XT and incomitant LHT. RNFL 92OD 90OS     Plan:   Exam continues to demonstrate a large incomitant XT  and moderate incomitant LHT. He will intermittently deny any diplopia, but will develop double vision after focusing.  Today he prefers 16 XT and 18 LHT on central gaze, therefore will provide updated rx. Due to the intermittent resolution of diplopia will obtain MG panel. Suspect patient is able to ignore an image when he reports no diplopia. Prior alignment testing demonstrated a LHT that fit into a L4th nerve palsy. Possible patient had a congenital 4th or a microvascular 4th nerve palsy that never improved. Will continue to monitor closely. If binocular diplopia worsens will request MRI brain wwo. Discussed referral to Dr Choe for strab eval however he deferred at this time due to multiple ongoing health concerns    -RTC in 12 months  -updated prism rx 16 XT and 18 LHT  -MG panel and anti-striated  -regular use of AT    Parkinson's disease with dyskinesia without fluctuating manifestations (HCC)  Follows with Dr Olson  Meds: Sinemet 25-100mg 2 tabs TID, Neupro 8mg patch daily, Rasagiline 0.5mg daily   Discussed decreased blink rate in PD and need for regular use of AT    Blepharitis of both eyes  Blepharitis on upper and lower lids OU  Previously noted at [facility]  Pt uses AT intermittent  Recommended warm compresses and regular use of AT    History of basal cell carcinoma (BCC) of eyelid  Hx of BCC of the R lower eyelid  S/p MOHS 11/1/22  No evidence of recurrence on exam           Wes Hoskins M.D.    71 total minutes were spent reviewing imaging, records, examining the patient and documenting.

## 2025-05-28 ENCOUNTER — APPOINTMENT (OUTPATIENT)
Dept: OPHTHALMOLOGY | Facility: MEDICAL CENTER | Age: 80
End: 2025-05-28
Payer: MEDICARE

## 2025-05-28 DIAGNOSIS — H53.2 DIPLOPIA: ICD-10-CM

## 2025-05-28 DIAGNOSIS — H01.006 BLEPHARITIS OF BOTH EYES, UNSPECIFIED EYELID, UNSPECIFIED TYPE: ICD-10-CM

## 2025-05-28 DIAGNOSIS — H01.003 BLEPHARITIS OF BOTH EYES, UNSPECIFIED EYELID, UNSPECIFIED TYPE: ICD-10-CM

## 2025-05-28 DIAGNOSIS — Z85.828 HISTORY OF BASAL CELL CARCINOMA (BCC) OF EYELID: ICD-10-CM

## 2025-05-28 DIAGNOSIS — G20.B1 PARKINSON'S DISEASE WITH DYSKINESIA WITHOUT FLUCTUATING MANIFESTATIONS (HCC): Primary | ICD-10-CM

## 2025-05-28 ASSESSMENT — REFRACTION_FINALRX: OD_HPRISM: 8 BI

## 2025-05-28 ASSESSMENT — CONF VISUAL FIELD
OD_SUPERIOR_NASAL_RESTRICTION: 0
OS_INFERIOR_NASAL_RESTRICTION: 0
OD_INFERIOR_TEMPORAL_RESTRICTION: 0
OS_NORMAL: 1
OS_SUPERIOR_NASAL_RESTRICTION: 0
OS_SUPERIOR_TEMPORAL_RESTRICTION: 0
OD_INFERIOR_NASAL_RESTRICTION: 0
OS_INFERIOR_TEMPORAL_RESTRICTION: 0
OD_NORMAL: 1
OD_SUPERIOR_TEMPORAL_RESTRICTION: 0

## 2025-05-28 ASSESSMENT — VISUAL ACUITY
CORRECTION_TYPE: GLASSES
OS_CC: 20/40
OD_CC: 20/30
METHOD: SNELLEN - LINEAR
OS_CC+: -2
OD_CC+: -2

## 2025-05-28 ASSESSMENT — REFRACTION_MANIFEST
METHOD_AUTOREFRACTION: 1
OS_SPHERE: -0.75
OD_CYLINDER: +1.50
OS_CYLINDER: +1.25
OD_AXIS: 044
OD_SPHERE: -0.75
OS_AXIS: 157

## 2025-05-28 ASSESSMENT — TONOMETRY
OS_IOP_MMHG: 11
IOP_METHOD: I-CARE
OD_IOP_MMHG: 13

## 2025-05-28 ASSESSMENT — REFRACTION_WEARINGRX
SPECS_TYPE: SVL
OD_CYLINDER: +1.00
OD_AXIS: 046
OS_AXIS: 151
OS_CYLINDER: +1.75
OD_SPHERE: +0.25
OS_SPHERE: +0.25

## 2025-05-28 ASSESSMENT — EXTERNAL EXAM - LEFT EYE: OS_EXAM: NORMAL

## 2025-05-28 ASSESSMENT — CUP TO DISC RATIO
OS_RATIO: 0.6
OD_RATIO: 0.6

## 2025-05-28 ASSESSMENT — EXTERNAL EXAM - RIGHT EYE: OD_EXAM: NORMAL

## 2025-05-28 ASSESSMENT — SLIT LAMP EXAM - LIDS: COMMENTS: BLEPHARITIS

## 2025-05-28 NOTE — ASSESSMENT & PLAN NOTE
Osman Choi is a 78 yo man with PMH of Parkinsons who presents for evaluation of diplopia    Developed double vision 4-5 years ago without instigating event. Never worked up in [place]. He denies any prior history of double vision or amblyopia. The double vision has been constant and stable since onset. It is horizontal and binocular in all gazes. Current near and distance rx have vertical and horizontal prism. Did not bring distance rx in. Reports prism rx eliminate double vision, except will have intermittent double on near that he is able to blink away    Exam 11/7/24: no APD, VA 20/40+1 OD, 20/60+1 OS PH, 1/9 color plates OU, full CF. EOM full. Alt cover demonstrates a large incomitant XT and large incomitant LHT. Exam difficult due to inattention. 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 Optic nerves appear pink with sharp disc margins. RNFL 90 OD 89 OS    Exam 5/28/25: no APD, VA 20/30-2 OD 20/40-2 OS, 1/9 color plates. Full CF. EOM full. Large incomitant XT and incomitant LHT. RNFL 92OD 90OS     Plan:   Exam continues to demonstrate a large incomitant XT  and moderate incomitant LHT. He will intermittently deny any diplopia, but will develop double vision after focusing.  Today he prefers 16 XT and 18 LHT on central gaze, therefore will provide updated rx. Due to the intermittent resolution of diplopia will obtain MG panel. Suspect patient is able to ignore an image when he reports no diplopia. Prior alignment testing demonstrated a LHT that fit into a L4th nerve palsy. Possible patient had a congenital 4th or a microvascular 4th nerve palsy that never improved. Will continue to monitor closely. If binocular diplopia worsens will request MRI brain wwo. Discussed referral to Dr Choe for strab eval however he deferred at this time due to multiple ongoing health concerns    -RTC in 12  months  -updated prism rx 16 XT and 18 LHT  -MG panel and anti-striated  -regular use of AT

## 2025-05-28 NOTE — ASSESSMENT & PLAN NOTE
Blepharitis on upper and lower lids OU  Previously noted at [facility]  Pt uses AT intermittent  Recommended warm compresses and regular use of AT

## 2025-06-05 ENCOUNTER — TELEPHONE (OUTPATIENT)
Dept: OPHTHALMOLOGY | Facility: MEDICAL CENTER | Age: 80
End: 2025-06-05
Payer: MEDICARE

## 2025-06-05 NOTE — TELEPHONE ENCOUNTER
Spoke to patient regarding blood work. MG panel neg. Anti-striated weakly positive at 1:40 (reference range <1:40). Pt again denies any worsening diplopia by the evening, shortness of breath, worsening weakness, or dysphagia. Low concerns for MG at this time. CTA chest 5/9/24 demonstrated normal soft tissue, no evidence of thymoma. Will continue to monitor. Discussed signs and symptoms of concern with patient.

## 2025-06-25 ENCOUNTER — TELEPHONE (OUTPATIENT)
Dept: NEUROLOGY | Facility: MEDICAL CENTER | Age: 80
End: 2025-06-25
Payer: MEDICARE

## 2025-06-25 NOTE — TELEPHONE ENCOUNTER
NEUROLOGY PATIENT PRE-VISIT PLANNING     Patient was NOT contacted to complete PVP.  Note: Patient will not be contacted if there is no indication to call.     Patient Appointment is scheduled as: Established Patient     Is visit type and length scheduled correctly? Yes    [facility] Patient Demographics? Yes    3.   Is referral attached to visit? Yes    4. Were records received from referring provider? Yes    4. Patient was NOT contacted to have someone accompany them to visit.     5. Is this appointment scheduled as a Hospital Follow-Up?  No    6. Does the patient require any pre procedure or post procedure follow up? No    7. If any orders were placed at last visit or intended to be done for this visit do we have Results/Consult Notes? Yes  Labs - Labs were not ordered at last office visit.  Imaging - Imaging was not ordered at last office visit.  Referrals - Referral ordered, patient has NOT been seen.  Note: If patient appointment is for lab or imaging review and patient did not complete the studies, check with provider if OK to reschedule patient until completed.    8. If patient appointment is for Botox - is order pended for provider? N/A    9. Was Plan Assessment from last Neurology Office Visit Reviewed?  Yes

## 2025-06-26 ENCOUNTER — TELEMEDICINE (OUTPATIENT)
Dept: NEUROLOGY | Facility: MEDICAL CENTER | Age: 80
End: 2025-06-26
Attending: INTERNAL MEDICINE
Payer: MEDICARE

## 2025-06-26 VITALS — BODY MASS INDEX: 26.48 KG/M2 | WEIGHT: 185 LBS | HEART RATE: 99 BPM | HEIGHT: 70 IN

## 2025-06-26 DIAGNOSIS — G20.B1 PARKINSON'S DISEASE WITH DYSKINESIA WITHOUT FLUCTUATING MANIFESTATIONS (HCC): Primary | ICD-10-CM

## 2025-06-26 PROCEDURE — 999999 HB NO CHARGE

## 2025-06-26 ASSESSMENT — FIBROSIS 4 INDEX: FIB4 SCORE: 2.93

## 2025-06-26 ASSESSMENT — PATIENT HEALTH QUESTIONNAIRE - PHQ9: CLINICAL INTERPRETATION OF PHQ2 SCORE: 0

## 2025-06-26 NOTE — PATIENT INSTRUCTIONS
Pre-Monthly Visit    Referring PCP; Tj Mckeon MD     History Of Present Illness  Ella is a 44 year old female presenting for Pre-monthly visit 1 month  for wellness and weight management the patient is pursuing sleeve gastrectomy.  She had 5 consecutive visits.  She gained 1 pound for the last 6 weeks.  She needs to continue making the lifestyle changes that are necessary for her to be a successful bariatric patient.  Because of her BMI she has to be on very low calorie diet at least 4 weeks prior to surgery.  We can submit her application for insurance approval.    Past Medical History:   Diagnosis Date   • Bronchitis    • Essential (primary) hypertension    • Genital warts    • Herpes simplex virus infection    • History of abnormal cervical Pap smear        • Vitamin D deficiency 2021        Past Surgical History:   Procedure Laterality Date   •  procedures      ,    •  section, low transverse     • Colposcopy bx cervix endocerv curr              Social History     Tobacco Use   • Smoking status: Never Smoker   • Smokeless tobacco: Never Used   Substance Use Topics   • Alcohol use: Yes     Comment: social   • Drug use: Not Currently         Family History   Problem Relation Age of Onset   • Patient is unaware of any medical problems Mother    • Hypertension Father    • Patient is unaware of any medical problems Brother    • Blood Disorder Maternal Grandmother    • Cancer Maternal Grandfather        ALLERGIES:  Penicillins    Current Outpatient Medications   Medication Sig Dispense Refill   • cholecalciferol (cholecalciferol) 25 mcg (1,000 units) tablet Take by mouth daily.     • cyanocobalamin 2000 MCG tablet Take 5,000 mcg by mouth daily.     • diclofenac (VOLTAREN) 75 MG EC tablet 1 tablet po bid prn pain 60 tablet 2   • montelukast (SINGULAIR) 10 MG tablet Take 1 tablet by mouth nightly. 90 tablet 0   • Ascorbic Acid (vitamin C) 100 MG tablet Take 500 mg by mouth  Stop rasagiline  Consider switch to Crexont 280mg, 2 capsules, 2 times a day when ready  Gradually lower Neupro once finding good dose of Crexont   daily.      • VITAMIN D, CHOLECALCIFEROL, PO Take 1 tablet by mouth daily.     • Multiple Vitamins-Minerals (vitamin - therapeutic multivitamins w/minerals) tablet Take 1 tablet by mouth daily.     • cetirizine (ZyrTEC) 10 MG tablet Take 10 mg by mouth daily.     • valACYclovir (VALTREX) 500 MG tablet Take 1 tablet by mouth daily. 90 tablet 3   • norethindrone (MICRONOR) 0.35 MG tablet Take 1 tablet by mouth daily. 90 tablet 3   • amLODIPine (NORVASC) 5 MG tablet Take 1 tablet by mouth daily. (Patient taking differently: Take 5 mg by mouth nightly. ) 90 tablet 1   • albuterol 108 (90 Base) MCG/ACT inhaler Inhale 2 puffs into the lungs every 4 hours as needed for Shortness of Breath or Wheezing. 1 Inhaler 2   • metroNIDAZOLE (METROGEL-VAGINAL) 0.75 % vaginal gel Place 1 applicator vaginally nightly. X 5days 70 g 0     No current facility-administered medications for this visit.        Review of Systems   All other systems reviewed and are negative.        Patient Reported Vitals  Vitals - Patient Reported  BP - Patient Reported: 130/80  Pulse - Patient Reported: 80  Temp - Patient Reported: 98 °F (36.7 °C)  Temp Source: Temporal  Weight - Patient Reported: (!) 335 lb (152 kg)  Height - Patient Reported: 5' 7\" (170.2 cm)  BMI kg/m2: 52.47  Pain Score:  0       In Person Vitals  There were no vitals taken for this visit.     Physical Exam  Constitutional:       Appearance: Normal appearance. She is obese.   HENT:      Head: Normocephalic.   Eyes:      Pupils: Pupils are equal, round, and reactive to light.   Pulmonary:      Effort: Pulmonary effort is normal.   Musculoskeletal:         General: No swelling.      Left lower leg: No edema.   Neurological:      Mental Status: She is alert and oriented to person, place, and time. Mental status is at baseline.   Psychiatric:         Mood and Affect: Mood normal.         Behavior: Behavior normal.         Judgment: Judgment normal.          LABS  Recent labs reviewed in  [facility] Outpatient Visit on 05/21/2021   Component Date Value Ref Range Status   • URINE PREGNANCY,QUAL 05/21/2021 Negative  Negative In process   • Internal Procedural Controls Accep* 05/21/2021 Yes   In process   • Case Report 05/21/2021    Final                    Value:Surgical Pathology                                Case: [id number]                                  Authorizing Provider:  Jennifer H Frankel, MD     Collected:           05/21/2021 0751              Ordering Location:     [facility]    Received:            05/21/2021 0952                                     GASTROENTEROLOGY                                                             Pathologist:           David Zamudio MD                                                                           Specimens:   A) - Small Intestine, Duodenum, duodenum bx r/o Celiac                                              B) - Stomach, gastric bx R/O Hpylori                                                      • Pathologic Diagnosis 05/21/2021    Final                    Value:This result contains rich text formatting which cannot be displayed here.   • Clinical Information 05/21/2021    Final                    Value:This result contains rich text formatting which cannot be displayed here.   • Gross Description 05/21/2021    Final                    Value:This result contains rich text formatting which cannot be displayed here.   • Disclaimer 05/21/2021    Final                    Value:This result contains rich text formatting which cannot be displayed here.   Lab Services on 05/18/2021   Component Date Value Ref Range Status   • SARS-CoV-2 by PCR 05/18/2021 Not Detected  Not Detected / Detected / Inhibitor Present Final    Pooled sample   • Isolation Guidelines 05/18/2021    Final                    Value:This result contains rich text formatting which  cannot be displayed here.   • Procedural Notes 05/18/2021    Final                    Value:This result contains rich text formatting which cannot be displayed here.   Lab Services on 04/16/2021   Component Date Value Ref Range Status   • Insulin, Fasting 04/16/2021 19  3 - 28 mUnits/L Final   • PTH, Intact 04/16/2021 70  19 - 88 pg/mL Final   • Sodium 04/16/2021 139  135 - 145 mmol/L Final   • Potassium 04/16/2021 3.6  3.4 - 5.1 mmol/L Final   • Chloride 04/16/2021 105  98 - 107 mmol/L Final   • Carbon Dioxide 04/16/2021 29  21 - 32 mmol/L Final   • Anion Gap 04/16/2021 9* 10 - 20 mmol/L Final   • Glucose 04/16/2021 111* 65 - 99 mg/dL Final   • BUN 04/16/2021 12  6 - 20 mg/dL Final   • Creatinine 04/16/2021 0.67  0.51 - 0.95 mg/dL Final   • Glomerular Filtration Rate 04/16/2021 >90  >90 mL/min/1.73m2 Final    eGFR results = or >90 mL/min/1.73m2 = Normal kidney function.   • BUN/ Creatinine Ratio 04/16/2021 18  7 - 25 Final   • Calcium 04/16/2021 9.3  8.4 - 10.2 mg/dL Final        Imaging  No Imaging to review.      Assessment/Plan:  Essential hypertension  Controlled with antihypertensive medications.  Followed by primary care physician.    Chronic rhinitis  Nasal sprays well controlled    Acute bilateral low back pain without sciatica  Current medications for pain, well controlled    Morbid obesity with BMI of 50.0-59.9, adult (CMS/HCC)    The patient is pursuing sleeve gastrectomy.  She 1 pound for the last 6 weeks.  She needs to continue making the lifestyle changes that are necessary for her to be a successful bariatric patient.  Follow the recommendations of the patient and exercise physiologist.  Patient has to be on very low calorie diet for 4 weeks prior to surgery.    History of 2019 novel coronavirus disease (COVID-19)  No problems at present    Vitamin D deficiency  Vitamin D tablets 1000 international units daily.       No orders of the defined types were placed in this encounter.       Patient  Instructions   Nutrition goals:  1. Keep up the good work with the eating plan!  2. Consider Bariatric Fusion vitamins + 500mcg Vitamin B12 post op.  You will likely need Ferretts iron each day as well after surgery.  3. Add Ferretts iron to your daily regimen.    4. Let us know if you have any questions.      EXERCISE PLAN:  An exercise plan was discussed with the doctor for Ella to continue with current exercise routine.   He is coming for premonthly visit.  She is pursuing sleeve gastrectomy.  She needs to do:  Continue making the lifestyle changes that are necessary for her to be a successful bariatric patient.  Very low calorie diet 4 weeks prior to surgery.  Resting metabolic rate.  Follow the recommendations of the dietitian and exercise physiologist.  We can submit her application for insurance approval.           Return in about 1 month (around 6/28/2021).     No LOS data to display  This includes documenting.    This visit is being performed via video to discuss Video Visit and Consultation    Clinician Location: [place] [address] Gil Jaramillo is in [place] and her identity has been established.   She was informed that consent to treat includes permission to submit charges to the applicable insurance on file. Ella was advised regarding the potential risk inherent in video visits, as the assessment may be limited due to what can be seen on the screen which potentially results in an incomplete assessment; as well as either of us may discontinue the video visit if it is felt that the videoconferencing connections are not adequate for his/her situation.   21-30 minutes were spent in this encounter.     No LOS data to display  This includes documenting.    Electronically signed by: Kirill Staton MD, 5/28/2021

## 2025-06-26 NOTE — PROGRESS NOTES
Adonay Olson,   Neurology, Movement Disorders [facility]  [address], [address]. [place] [address]  Phone: [phone], Fax: [phone]     ASSESSMENT / PLAN   Osman Choi is a 79 y.o. RHD male presenting for Parkinson's    Parkinson's disease  Gait instability  Dx 2016 with left rest tremor.   Current regimen:   - Rasagiline 0.5 mg  - Neupro patch 8 mg  - Carbidopa/levodopa 25/100: 2 tablet, 3x/day    Tongue protrusions may be related to dyskinesias, and recent episode of visual hallucinations without clear medical cause aside from possible polypharmacy. Will simplify regimen to minimize dyskinesias. Amantadine also considered.  Stop rasagiline  Consider switch to Crexont 280mg, 2 capsules, 2 times a day  Gradually lower Neupro once finding good dose of Crexont    Visual hallucinations  Brief episode 6/2025, possibly medication induced  - changes as above    Hypophonia  - CTM    Bruxism/facial dystonia  Sialorrhea  Drier humidity helps the drooling a bit more, but interested in continuing  - Botox 100U: repeat every 3 months    REM sleep behavior disorder  - melatonin if worsening  - discussed safe sleep environment    Diplopia  Was seen at [facility] for diplopia, but had to have basal cell carcinoma of eyelid removed prior to strabismus surgery  - following with Dr. Hoskins neuro-ophthalmology      No orders of the defined types were placed in this encounter.    Return in 7 weeks (on 8/14/2025).    BILLING DOCUMENTATION:   Excluding time spent on procedures during visit, I spent 40 minutes reviewing the medical record, interviewing and examining the patient, discussing diagnosis and treatment, and coordinating care.    No procedure    This evaluation was conducted via [employer] Teams using secure and encrypted videoconferencing technology. The patient was in their home in the [facility].    The patient's identity was confirmed and verbal consent was obtained for this  virtual visit.                HISTORY OF PRESENT ILLNESS   Osman Choi is a 79 y.o. RHD male presenting for Parkinson's  PMH: atrial fibrillation    Initial HPI 10/09/24  Dx: 2016, with initial symptoms as left hand rest tremor.      Interval history  4/2022 Dr. Denny Combs in [place], [place]   8/2024 Dr. Cheikh Campbell: Started ropinirole 1mg nightly for what was thought to be RLS, but discontinued.   10/09/24: first visit with me. Referral to neuro-oph, audiology.  11/07/24: Botox #1 for jaw dystonia/bruxism and sialorrhea.  04/11/25: Botox #2. Referral to SLP  4/13/25: fell resulting in left proximal humerus and dislocated glenohumeral joint, s/p total shoulder replacement  6/10/25: hallucinations and found neupro patch detached  06/26/25: stop ropinirole, consider Crexont and taper Neupro       Current Regimen  Rasagiline 0.5 mg  Neupro patch 8 mg  Carbidopa/levodopa 25/100: 2 tablet, 3x/day at 9AM, 1PM, 6PM  Latency: can't tell  Duration: can't tell  Efficacy: can't tell  Dyskinesia/Dystonia: tongue protrusions at times. Legs can move sometimes. Used to have toe curling.  Sfx: none      ROS:  Gait:   a few falls since last visit. Balance worsening, shuffling more.   04/11/25: had pneumonia and recovering strength. Not as balanced. Not yet interested in PT  06/26/25: Home health PT, then outpatient PT for shoulder. Needing assistance and gait belt due to shoulder replacement  Orthostasis:   no issues  Constipation:   no issues  Urinary issues:   no issues, hx of UTI   Speech/Swallow:   no dysphagia.   +hypophonia. 04/11/25: Worsening, hard to understand at restaurants  Anosmia: +impaired  Cognition:   no issues  Mood:   no anxiety, no depression, no impulsivity  04/11/25 PHQ-9 = 0, C-SSRS = neg  Hallucinations:   none  Sleep/RBD:   +RBD: dream enactment ongoing for several years. 04/11/25: improved  Sleeps well, 1-2x/night for bathroom. No daytime fatigue    Past Medical History:   Diagnosis Date    Parkinson  disease (HCC)      Past Surgical History:   Procedure Laterality Date    ORIF, SHOULDER       No family history on file.  Social History     Socioeconomic History    Marital status:      Spouse name: Not on file    Number of children: Not on file    Years of education: Not on file    Highest education level: Not on file   Occupational History    Not on file   Tobacco Use    Smoking status: Never    Smokeless tobacco: Never   Vaping Use    Vaping status: Never Used   Substance and Sexual Activity    Alcohol use: Yes     Alcohol/week: 6.0 oz     Types: 10 Glasses of wine per week    Drug use: Never    Sexual activity: Not on file   Other Topics Concern    Not on file   Social History Narrative    Not on file     Social Drivers of Health     Financial Resource Strain: Not on file   Food Insecurity: Not on file (4/23/2025)   Transportation Needs: No Transportation Needs (6/9/2025)    Received from [facility] (and [facility] prior to 7/1/2021)    OASIS : Transportation     Lack of Transportation (Medical): No     Lack of Transportation (Non-Medical): No     Patient Unable or Declines to Respond: No   Physical Activity: Not on file   Stress: Not on file   Social Connections: Feeling Socially Integrated (6/9/2025)    Received from [facility] (and [facility] prior to 7/1/2021)    OASIS : Social Isolation     Frequency of experiencing loneliness or isolation: Rarely   Intimate Partner Violence: Not At Risk (6/10/2025)    Received from [facility] (and [facility] prior to 7/1/2021)    Feeling Safe      Are you in a relationship with someone who hurts you emotionally and/or physically?: No   Housing Stability: Low Risk  (4/18/2025)    Housing Stability Vital Sign     Unable to Pay for Housing in the Last Year: No     Number of Times Moved in the Last Year: 0     Homeless in  "the Last Year: No     Current Outpatient Medications   Medication    nebivolol (BYSTOLIC) 5 MG Tab tablet    acetaminophen (TYLENOL) 500 MG Tab    Rotigotine (NEUPRO) 8 MG/24HR PATCH 24 HR    Rasagiline Mesylate 0.5 MG Tab    carbidopa-levodopa (SINEMET)  MG Tab    apixaban (ELIQUIS) 5mg Tab    pravastatin (PRAVACHOL) 40 MG tablet    alendronate (FOSAMAX) 70 MG Tab    Cyanocobalamin (B-12) 1000 MCG Tab    Cholecalciferol (D3 2000 PO)     Current Facility-Administered Medications   Medication Dose    onabotulinum toxin type A (Botox) injection 100 Units  100 Units     No Known Allergies          DATA / RESULTS   CT Head 8/2024  1. No acute intracranial abnormality.   2. Calvarium is intact.     Vit D: 60  TSH: 1.73  B12: 448  Folate: 8.2    No results found for: \"25HYDROXY\", \"ARETDGKA84\", \"TSHULTRASEN\", \"SPIFINTERP\", \"ANI833\", \"JEROD\", \"HBA1C\", \"LDL\"             OBJECTIVE      Vitals:    06/26/25 0847   Pulse: 99   Weight: 83.9 kg (185 lb)   Height: 1.778 m (5' 10\")     Physical Exam  +hypophonia    Intermittent head movements and tongue protrusions.      PRIOR EXAM BELOW FOR REFERENCE  UPDRS Right Left   Finger tapping 0 1   Hand Movement 0 0   Toe Tapping 0 0   Leg Agility 0 1   Rigidity 0 1   Rest Tremor 0 0   Postural Tremor 0 0   Kinetic Tremor 0 0      No dystonia, tics, stereotypies, athetosis, akathisia, or chorea noted.     Gait  Posture - slightly stooped   Base - narrow   Stride length - decreased mild   Arm swing - decreased   Speed - normal  Shuffling/freezing - none  U-Turn - normal           PROCEDURE   N/A  "

## 2025-07-01 DIAGNOSIS — I48.11 LONGSTANDING PERSISTENT ATRIAL FIBRILLATION (HCC): ICD-10-CM

## 2025-07-01 RX ORDER — NEBIVOLOL 5 MG/1
5 TABLET ORAL DAILY
Qty: 90 TABLET | Refills: 3 | Status: SHIPPED | OUTPATIENT
Start: 2025-07-01

## 2025-07-01 NOTE — TELEPHONE ENCOUNTER
CHANGE OF PHARMACY   Is the patient due for a refill? No    Was the patient seen the last 15 months? Yes    Date of last office visit: 5/14/25    Does the patient have an upcoming appointment?  Yes   If yes, When? 9/23/25    Provider to refill:TT    Does the patient have [facility] and need 100-day supply? (This applies to ALL medications) Patient does not have SCP

## 2025-07-01 NOTE — TELEPHONE ENCOUNTER
Last OV with TT on 5/14/25. No calls/ER visits for symptomatic bradycardia. Pt has active refills available at [id number] in [place] but is requesting switch to [facility] home delivery pharmacy. [facility] Rx sent to [facility].

## 2025-07-02 ENCOUNTER — APPOINTMENT (OUTPATIENT)
Dept: RADIOLOGY | Facility: MEDICAL CENTER | Age: 80
End: 2025-07-02
Attending: STUDENT IN AN ORGANIZED HEALTH CARE EDUCATION/TRAINING PROGRAM
Payer: MEDICARE

## 2025-07-02 ENCOUNTER — HOSPITAL ENCOUNTER (EMERGENCY)
Facility: MEDICAL CENTER | Age: 80
End: 2025-07-02
Attending: STUDENT IN AN ORGANIZED HEALTH CARE EDUCATION/TRAINING PROGRAM
Payer: MEDICARE

## 2025-07-02 VITALS
WEIGHT: 175 LBS | HEIGHT: 70 IN | RESPIRATION RATE: 18 BRPM | SYSTOLIC BLOOD PRESSURE: 135 MMHG | HEART RATE: 92 BPM | BODY MASS INDEX: 25.05 KG/M2 | OXYGEN SATURATION: 95 % | DIASTOLIC BLOOD PRESSURE: 97 MMHG

## 2025-07-02 DIAGNOSIS — S02.0XXA CLOSED FRACTURE OF FRONTAL BONE, INITIAL ENCOUNTER (HCC): ICD-10-CM

## 2025-07-02 DIAGNOSIS — S02.85XA CLOSED FRACTURE OF ORBIT, INITIAL ENCOUNTER (HCC): ICD-10-CM

## 2025-07-02 DIAGNOSIS — S01.81XA FACIAL LACERATION, INITIAL ENCOUNTER: ICD-10-CM

## 2025-07-02 DIAGNOSIS — S02.19XA CLOSED FRACTURE OF FRONTAL SINUS, INITIAL ENCOUNTER (HCC): ICD-10-CM

## 2025-07-02 DIAGNOSIS — S09.90XA CLOSED HEAD INJURY, INITIAL ENCOUNTER: Primary | ICD-10-CM

## 2025-07-02 LAB
CFT BLD TEG: 3.2 MIN (ref 4.6–9.1)
CFT P HPASE BLD TEG: 3.2 MIN (ref 4.3–8.3)
CLOT ANGLE BLD TEG: 73.4 DEGREES (ref 63–78)
CT.EXTRINSIC BLD ROTEM: 1.3 MIN (ref 0.8–2.1)
MCF BLD TEG: 61.3 MM (ref 52–69)
MCF.PLATELET INHIB BLD ROTEM: 18.7 MM (ref 15–32)
PA AA BLD-ACNC: 49.2 % (ref 0–11)
PA ADP BLD-ACNC: 34.7 % (ref 0–17)
TEG ALGORITHM TGALG: ABNORMAL

## 2025-07-02 PROCEDURE — 70450 CT HEAD/BRAIN W/O DYE: CPT

## 2025-07-02 PROCEDURE — 73030 X-RAY EXAM OF SHOULDER: CPT | Mod: LT

## 2025-07-02 PROCEDURE — 85576 BLOOD PLATELET AGGREGATION: CPT | Mod: 91

## 2025-07-02 PROCEDURE — 304217 HCHG IRRIGATION SYSTEM

## 2025-07-02 PROCEDURE — 73560 X-RAY EXAM OF KNEE 1 OR 2: CPT | Mod: RT

## 2025-07-02 PROCEDURE — 303747 HCHG EXTRA SUTURE

## 2025-07-02 PROCEDURE — 99285 EMERGENCY DEPT VISIT HI MDM: CPT

## 2025-07-02 PROCEDURE — 70486 CT MAXILLOFACIAL W/O DYE: CPT

## 2025-07-02 PROCEDURE — 304999 HCHG REPAIR-SIMPLE/INTERMED LEVEL 1

## 2025-07-02 PROCEDURE — 700102 HCHG RX REV CODE 250 W/ 637 OVERRIDE(OP): Performed by: STUDENT IN AN ORGANIZED HEALTH CARE EDUCATION/TRAINING PROGRAM

## 2025-07-02 PROCEDURE — 303353 HCHG DERMABOND SKIN ADHESIVE

## 2025-07-02 PROCEDURE — 700101 HCHG RX REV CODE 250: Performed by: STUDENT IN AN ORGANIZED HEALTH CARE EDUCATION/TRAINING PROGRAM

## 2025-07-02 PROCEDURE — 85384 FIBRINOGEN ACTIVITY: CPT

## 2025-07-02 PROCEDURE — 73560 X-RAY EXAM OF KNEE 1 OR 2: CPT | Mod: LT

## 2025-07-02 PROCEDURE — 73130 X-RAY EXAM OF HAND: CPT | Mod: RT

## 2025-07-02 PROCEDURE — A9270 NON-COVERED ITEM OR SERVICE: HCPCS | Performed by: STUDENT IN AN ORGANIZED HEALTH CARE EDUCATION/TRAINING PROGRAM

## 2025-07-02 PROCEDURE — 72125 CT NECK SPINE W/O DYE: CPT

## 2025-07-02 PROCEDURE — 85347 COAGULATION TIME ACTIVATED: CPT

## 2025-07-02 RX ORDER — HYDROCODONE BITARTRATE AND ACETAMINOPHEN 5; 325 MG/1; MG/1
1 TABLET ORAL ONCE
Refills: 0 | Status: COMPLETED | OUTPATIENT
Start: 2025-07-02 | End: 2025-07-02

## 2025-07-02 RX ORDER — LIDOCAINE HYDROCHLORIDE AND EPINEPHRINE 10; 10 MG/ML; UG/ML
10 INJECTION, SOLUTION INFILTRATION; PERINEURAL ONCE
Status: COMPLETED | OUTPATIENT
Start: 2025-07-02 | End: 2025-07-02

## 2025-07-02 RX ORDER — HYDROCODONE BITARTRATE AND ACETAMINOPHEN 5; 325 MG/1; MG/1
1 TABLET ORAL EVERY 6 HOURS PRN
Qty: 10 TABLET | Refills: 0 | Status: SHIPPED | OUTPATIENT
Start: 2025-07-02 | End: 2025-07-02

## 2025-07-02 RX ORDER — HYDROCODONE BITARTRATE AND ACETAMINOPHEN 5; 325 MG/1; MG/1
1 TABLET ORAL EVERY 6 HOURS PRN
Qty: 10 TABLET | Refills: 0 | Status: SHIPPED | OUTPATIENT
Start: 2025-07-02 | End: 2025-07-07

## 2025-07-02 RX ADMIN — HYDROCODONE BITARTRATE AND ACETAMINOPHEN 1 TABLET: 5; 325 TABLET ORAL at 20:30

## 2025-07-02 RX ADMIN — LIDOCAINE HYDROCHLORIDE AND EPINEPHRINE 10 ML: 10; 10 INJECTION, SOLUTION INFILTRATION; PERINEURAL at 20:13

## 2025-07-02 ASSESSMENT — PAIN DESCRIPTION - PAIN TYPE
TYPE: ACUTE PAIN
TYPE: ACUTE PAIN

## 2025-07-02 ASSESSMENT — FIBROSIS 4 INDEX: FIB4 SCORE: 2.93

## 2025-07-03 NOTE — ED TRIAGE NOTES
Chief Complaint   Patient presents with    T-5000 FALL     Pt was walking out of the grocery store when he lost his footing on loose gravel and fell forward onto his face, -LOC, + thinners    Head Injury     A+O x 4, GCS 15. Hx of a-fib, UTD on tetanus.

## 2025-07-03 NOTE — ED NOTES
Patient ambulated around blue pod hallway wit little to no assistance. They appear steady on their feet and directable by their caretaker.

## 2025-07-03 NOTE — ED NOTES
Xray  at bedside, Wife at bedside plan of care ongoing. Epi pulled for provider to repair lac waiting on scans to be completed.

## 2025-07-03 NOTE — ED NOTES
Seen by ERP-- Pt is for discharge, home instructions advised, follow up instructions and health education imparted. Patient verbalized understanding.  Pt assisted to the lobby by wheelchair, accompanied by wife

## 2025-07-03 NOTE — DISCHARGE INSTRUCTIONS
You do have multiple facial fractures.  Thankfully with discussion with the facial fracture doctor none of them need operative repair.  We did discuss follow-up and I will provide the details free to follow-up with facial fracture surgeon.    Please take Tylenol or ibuprofen for pain as needed.  I think it is reasonable to restart your Eliquis given your risk of stroke from your chronic A-fib.  Please return if you are having complications from your fall or your injuries today otherwise please follow-up with a doctor for stitch removal and follow-up with facial fracture doctor.

## 2025-07-03 NOTE — ED PROVIDER NOTES
ER Provider Note    Scribed for Ayla Martinez D.O. by Delores Bull. 7/2/2025  6:02 PM    Primary Care Provider: Durga Anderson M.D.    CHIEF COMPLAINT   Chief Complaint   Patient presents with    T-5000 FALL     Pt was walking out of the grocery store when he lost his footing on loose gravel and fell forward onto his face, -LOC, + thinners    Head Injury     EXTERNAL RECORDS REVIEWED  Patient was admitted for care on 6/10/25 for care.     HPI/ROS  LIMITATION TO HISTORY   Select: : None  OUTSIDE HISTORIAN(S):  EMS present at bedside.    Osman Choi is a 80 y.o. male who presents to the ED via EMS after a fall today while walking out of the store. The patient was found down, after landing directly on his face. The patient denies any loss of consciousness. He takes Eliquis for Afib. EMS reports obvious laceration to the forehead, bleeding controlled on site. He was noted to be AxOx4. EMS reports stable vitals en route. The patient denies any neck or back pain.     PAST MEDICAL HISTORY  Past Medical History[1]    SURGICAL HISTORY  Past Surgical History[2]    FAMILY HISTORY  No family history on file.    SOCIAL HISTORY   reports that he has never smoked. He has never used smokeless tobacco. He reports current alcohol use of about 6.0 oz of alcohol per week. He reports that he does not use drugs.    CURRENT MEDICATIONS  Previous Medications    ACETAMINOPHEN (TYLENOL) 500 MG TAB    Take 2 Tablets by mouth every 6 hours as needed for Mild Pain.    ALENDRONATE (FOSAMAX) 70 MG TAB    Take 70 mg by mouth every 7 days.    APIXABAN (ELIQUIS) 5MG TAB    Take 5 mg by mouth 2 times a day.    CARBIDOPA-LEVODOPA (SINEMET)  MG TAB    Take 2 Tablets by mouth 3 times a day. For Parkinson's    CHOLECALCIFEROL (D3 2000 PO)    Take 1 Tablet by mouth every day.    CYANOCOBALAMIN (B-12) 1000 MCG TAB    Take 1 Tablet by mouth every day.    NEBIVOLOL (BYSTOLIC) 5 MG TAB TABLET    Take 1 Tablet by mouth every day.     "PRAVASTATIN (PRAVACHOL) 40 MG TABLET    Take 40 mg by mouth every evening.    RASAGILINE MESYLATE 0.5 MG TAB    Take 1 Tablet by mouth every day. For Parkinson's    ROTIGOTINE (NEUPRO) 8 MG/24HR PATCH 24 HR    Place 8 mg on the skin every day. For Parkinson's       ALLERGIES  Patient has no known allergies.    PHYSICAL EXAM  BP (!) 132/91   Pulse 99   Resp 18   Ht 1.778 m (5' 10\")   Wt 79.4 kg (175 lb)   SpO2 97%   BMI 25.11 kg/m²     Pulse oximetry interpretation: I interpret the pulse oximetry as normal.  Constitutional: Awake and alert. No acute distress.  Head: Normocephalic.  Patient has scattered abrasions and small lacerations to the nose, obvious deformity of the nose, bruising and small laceration over the right maxillary region, bruising to the right orbit.  He has a 3 cm laceration to the right central forehead.  HEENT: Normal Conjunctiva.  PERRLA  Neck: Grossly normal range of motion. Airway midline.  Cardiovascular: Normal heart rate, Normal rhythm.  Thorax & Lungs: No respiratory distress. Clear to Auscultation bilaterally.  Abdomen: Normal inspection. Nontender. Nondistended  Skin: No obvious rash.  Well-healed left shoulder surgical scar  Back: No midline tenderness.  Musculoskeletal: No obvious deformity. Moves all extremities Well.  Neurologic: A&Ox4.  No focal neurologic deficits.  Psychiatric: Mood and affect are appropriate for situation.    DIAGNOSTIC STUDIES    LABS  Results for orders placed or performed during the hospital encounter of 07/02/25   PLATELET MAPPING WITH BASIC TEG    Collection Time: 07/02/25  5:30 PM   Result Value Ref Range    Reaction Time Initial-R 3.2 (L) 4.6 - 9.1 min    React Time Initial Hep 3.2 (L) 4.3 - 8.3 min    Clot Kinetics-K 1.3 0.8 - 2.1 min    Clot Angle-Angle 73.4 63.0 - 78.0 degrees    Maximum Clot Strength-MA 61.3 52.0 - 69.0 mm    TEG Functional Fibrinogen(MA) 18.7 15.0 - 32.0 mm    % Inhibition ADP 34.7 (H) 0.0 - 17.0 %    % Inhibition AA 49.2 (H) " 0.0 - 11.0 %    TEG Algorithm Link Algorithm      RADIOLOGY/PROCEDURES   The attending emergency physician has independently interpreted the diagnostic imaging associated with this visit and am waiting the final reading from the radiologist.   My preliminary interpretation is a follows: No intracranial hemorrhage    Radiologist interpretation:  DX-SHOULDER 2+ LEFT   Final Result      No acute osseous abnormality.      DX-HAND 3+ RIGHT   Final Result         No acute osseous abnormality.      DX-KNEE 2- RIGHT   Final Result         1. No acute osseous abnormality.      DX-KNEE 2- LEFT   Final Result      No acute osseous abnormality.      CT-MAXILLOFACIAL W/O PLUS RECONS   Final Result      1.  Acute bilateral nasal bone fractures which are angulated and depressed.   2.  Acute nondisplaced fracture of the right frontal bone, anterior and posterior walls of the right frontal sinus, right medial orbital wall and right orbital roof.   3.  Tiny amount of pneumocephalus and extraconal air in the superior right orbit.   4.  2.1 x 1.6 cm masslike lesion within or adjacent to the left parotid gland. Further evaluation with outpatient contrast-enhanced soft tissue neck CT may be performed.      CT-CSPINE WITHOUT PLUS RECONS   Final Result      There is artifact (due to left shoulder replacement) over the superior endplate L1 which appear somewhat irregular. If there is concern for fracture, MRI is recommended.         CT-HEAD W/O   Final Result      1.  No evidence of acute intracranial hemorrhage or mass lesion.   2.  There is a nondisplaced fracture involving the right frontal calvarium and right frontal sinus.                             Laceration Repair Procedure Note    Indication: Laceration    Procedure: The patient was placed in the appropriate position and anesthesia around the laceration was obtained by infiltration using 1% Lidocaine with epinephrine. The wound was minimally contaminated .Derma bond to the right  maxillary. The laceration was closed with 5-0 Ethilon using interrupted sutures. There were no additional lacerations requiring repair. The wound area was then dressed with a sterile dressing.      Total repaired wound length: 3 cm.     Other Items: None    The patient tolerated the procedure well.    Complications: None      COURSE & MEDICAL DECISION MAKING     ASSESSMENT, COURSE AND PLAN  Care Narrative:     Nursing notes, VS, PMSFHx, labs, imaging, EKG reviewed in chart.     7:28 PM  80 y.o. YO male on Eliquis for A-fib presents after a fall today leaving the store, TBI activation. The patient has been evaluated at charge desk for immediate orders.   Afebrile and hypertensive  Pertinent exam findings include nose deformity and small lacerations, 3 cm forehead laceration, bruising to the right orbit.  He is otherwise awake and alert with no focal deficits  Differentials considered but not exhaustive list including intracranial hemorrhage, lacerations, facial fractures    Cardiac monitoring was reviewed during this ED Visit.  No arrhythmia demonstrated    Patient was taken for CT imaging where he was found to have a frontal fracture, frontal sinus fracture, orbital fractures.  He does not exhibit signs of entrapment, he has nasal bone fractures.    I spoke with Dr. Villarreal who recommended outpatient follow-up and did not recommend surgical or immediate care for the above fractures.  We discussed that frontal fracture and a advised neurosurgery would be the definitive consultation.    6:27 PM I discussed the patient's case and the above findings with Dr. Lewis (neurosurgery) who also recommends no immediate management and no follow-up needed for his frontal fracture.    7:50 PM - Laceration repair preformed at this time, as detailed above.Patient had the opportunity to ask any questions.     Considered antibiotics for sinus fracture.  Discussed the case with Dr. Avila who did not feel strongly 1 way or another.   Literature is equivocal and therefore will not prescribe antibiotics currently.    We will ensure the patient is ambulatory, has no new pain complaints and is at his baseline prior to discharge.  Wife is at bedside and they plan to stay in hotel before going back to [place] tomorrow.  Will discharge with pain medicine.    The plan for discharge was discussed with them and they were told to return for any new or worsening symptoms. He was also informed of the plans for follow up. Patient is understanding and agreeable to the plan for discharge.    ADDITIONAL PROBLEM LIST    Orbital fracture  Nasal bone fractures  Frontal fracture  Frontal sinus fracture  Accelerated hypertension  Facial lacerations  On anticoagulation for A-fib    DISPOSITION AND DISCUSSIONS  I have discussed management of the patient with the following physicians and EDDIE's:  Dr. Lewis (Neurology)     Discussion of management with other [facility] or appropriate source(s): None     Escalation of care considered, and ultimately not performed: diagnostic imaging and acute inpatient care management, however at this time, the patient is most appropriate for outpatient management.    Barriers to care at this time, including but not limited to: None.     Decision tools and prescription drugs considered including, but not limited to: Pain Medications for acute traumatic fracture pain.    I reviewed prescription monitoring program for patient's narcotic use before prescribing a scheduled drug.The patient will not drink alcohol nor drive with prescribed medications. The patient will return for new or worsening symptoms and is stable at the time of discharge.    The patient is referred to a primary physician for blood pressure management, diabetic screening, and for all other preventative health concerns.    In prescribing controlled substances to this patient, I certify that I have obtained and reviewed the medical history of Osman Choi. I have also made a  good nissa effort to obtain applicable records from other providers who have treated the patient and records did not demonstrate any increased risk of substance abuse that would prevent me from prescribing controlled substances.     I have conducted a physical exam and documented it. I have reviewed Mr. Choi’s prescription history as maintained by the [place] Prescription Monitoring Program.     I have assessed the patient’s risk for abuse, dependency, and addiction using the validated Opioid Risk Tool available at https://www.mdcalc.com/ygoajv-soyl-garm-ort-narcotic-abuse.     Given the above, I believe the benefits of controlled substance therapy outweigh the risks. The reasons for prescribing controlled substances include in my professional opinion, controlled substances are the only reasonable choice for this patient because otc meds not adequate. Accordingly, I have discussed the risk and benefits, treatment plan, and alternative therapies with the patient.     DISPOSITION:  Patient will be discharged home in stable condition.    FOLLOW UP:  Durga Anderson M.D.  [address]  [place] [address]  [phone]    Schedule an appointment as soon as possible for a visit       [facility], Emergency Dept  [address] [id number] [phone]  On 7/12/2025  For suture removal    Pipe Villarreal M.D.  [address]  [place] [facility] [address]  [phone]    Schedule an appointment as soon as possible for a visit         OUTPATIENT MEDICATIONS:  New Prescriptions    HYDROCODONE-ACETAMINOPHEN (NORCO) 5-325 MG TAB PER TABLET    Take 1 Tablet by mouth every 6 hours as needed (pain) for up to 5 days.       FINAL DIANGOSIS  1. Closed head injury, initial encounter    2. Closed fracture of orbit, initial encounter (HCC)    3. Closed fracture of frontal sinus, initial encounter ([facility])    4. Closed fracture of frontal bone, initial encounter ([facility])    5. Facial laceration,  initial encounter    Laceration Repair was Preformed.       Delores GARCAI (Scribe), am scribing for, and in the presence of, Ayla Martinez D.O..    Electronically signed by: Delores Bull (Scribe), 7/2/2025    Ayla GARCIA D.O. personally performed the services described in this documentation, as scribed by Delores Bull in my presence, and it is both accurate and complete.    The note accurately reflects work and decisions made by me.  Ayla Martinez D.O.  7/2/2025  10:44 PM         [1]   Past Medical History:  Diagnosis Date    Parkinson disease (HCC)    [2]   Past Surgical History:  Procedure Laterality Date    ORIF, SHOULDER

## 2025-07-03 NOTE — ED NOTES
Bedside report received from off going RN/tech: Courtney RN, assumed care of patient.  POC discussed with patient. Call light within reach, all needs addressed at this time.       Fall risk interventions in place: Patient's personal possessions are with in their safe reach, Place fall risk sign on patient's door, Give patient urinal if applicable, Keep floor surfaces clean and dry, and Bed Alarm in use (all applicable per [facility] Fall risk assessment)   Continuous monitoring: Cardiac Leads, Pulse Ox, or Blood Pressure  IVF/IV medications: Not Applicable   Oxygen: Room Air  Bedside sitter: Not Applicable   Isolation: Not Applicable

## 2025-07-08 NOTE — Clinical Note
REFERRAL APPROVAL NOTICE         Sent on July 8, 2025                   Maxim Choi  [address]  [place] [address]                   Dear Mr. Choi,    After a careful review of the medical information and benefit coverage, [facility] has processed your referral. See below for additional details.    If applicable, you must be actively enrolled with your insurance for coverage of the authorized service. If you have any questions regarding your coverage, please contact your insurance directly.    REFERRAL INFORMATION   Referral #:  [id number]  Referred-To Provider    Referred-By Provider:  Plastic Surgery    [facility]      [address]  [facility] [place] [facility] [place] [address]  [phone] [address]  [facility] [address] [phone]    Referral Start Date:  07/02/2025  Referral End Date:   07/02/2026             SCHEDULING  If you do not already have an appointment, please call [phone] to make an appointment.     MORE INFORMATION  If you do not already have a [employer] account, sign up at: [email or web address]  You can access your medical information, make appointments, see lab results, billing information, and more.  If you have questions regarding this referral, please contact  the [facility] Referrals department at:             [phone]. Monday - Friday 8:00AM - 5:00PM.     Sincerely,    [facility]

## 2025-07-09 ENCOUNTER — TELEMEDICINE (OUTPATIENT)
Dept: NEUROLOGY | Facility: MEDICAL CENTER | Age: 80
End: 2025-07-09
Attending: INTERNAL MEDICINE
Payer: MEDICARE

## 2025-07-09 ENCOUNTER — TELEPHONE (OUTPATIENT)
Dept: NEUROLOGY | Facility: MEDICAL CENTER | Age: 80
End: 2025-07-09
Payer: MEDICARE

## 2025-07-09 VITALS
SYSTOLIC BLOOD PRESSURE: 82 MMHG | HEIGHT: 70 IN | BODY MASS INDEX: 25.77 KG/M2 | DIASTOLIC BLOOD PRESSURE: 64 MMHG | HEART RATE: 97 BPM | WEIGHT: 180 LBS

## 2025-07-09 DIAGNOSIS — S06.5XAA SUBDURAL HEMATOMA (HCC): Primary | ICD-10-CM

## 2025-07-09 DIAGNOSIS — G20.B1 PARKINSON'S DISEASE WITH DYSKINESIA WITHOUT FLUCTUATING MANIFESTATIONS (HCC): ICD-10-CM

## 2025-07-09 PROCEDURE — G2211 COMPLEX E/M VISIT ADD ON: HCPCS | Mod: 95 | Performed by: INTERNAL MEDICINE

## 2025-07-09 PROCEDURE — 999999 HB NO CHARGE

## 2025-07-09 PROCEDURE — 99215 OFFICE O/P EST HI 40 MIN: CPT | Mod: 95 | Performed by: INTERNAL MEDICINE

## 2025-07-09 ASSESSMENT — PATIENT HEALTH QUESTIONNAIRE - PHQ9: CLINICAL INTERPRETATION OF PHQ2 SCORE: 0

## 2025-07-09 ASSESSMENT — FIBROSIS 4 INDEX: FIB4 SCORE: 3.14

## 2025-07-09 NOTE — Clinical Note
Can you help fax the CT Head order to [facility]?  Diagnostic Imaging - [facility] [address] [place] [address] [phone]

## 2025-07-09 NOTE — PROGRESS NOTES
Adonay Olson,   Neurology, Movement Disorders [facility]  [address], [address]. [place] [address]  Phone: [phone], Fax: [phone]     ASSESSMENT / PLAN   Osman Choi is a 79 y.o. RHD male presenting for Parkinson's    Parkinson's disease  Gait instability  Dx 2016 with left rest tremor. Having intermittent hallucinations. No clear clinical change with stopping rasagiline. Will lower Neupro dose next as dopamine agonists can be causing some hallucinations.     Tongue protrusions may be related to dyskinesias. Will simplify regimen to minimize dyskinesias. Amantadine also considered. In future:   Consider switch to Crexont 280mg, 2 capsules, 2 times a day  Gradually lower Neupro further once finding good dose of Crexont    - Neupro patch 8 mg --> 6mg  - Carbidopa/levodopa 25/100: 2 tablet, 3x/day    Bilateral subdural hematoma  - CT Head repeat Aug 2025. Fax to [facility]    Visual hallucinations  Brief episode 6/2025, possibly medication induced  - changes as above  - consider rivastigmine next    Hypophonia  - CTM    Bruxism/facial dystonia  Sialorrhea  Drier humidity helps the drooling a bit more, but interested in continuing  - Botox 100U: repeat every 3 months    REM sleep behavior disorder  - melatonin if worsening  - discussed safe sleep environment    Diplopia  Was seen at [facility] for diplopia, but had to have basal cell carcinoma of eyelid removed prior to strabismus surgery  - following with Dr. Hoskins neuro-ophthalmology      Orders Placed This Encounter    CT-HEAD W/O    rotigotine (NEUPRO) 6 MG/24HR patch     No follow-ups on file.    BILLING DOCUMENTATION:   Excluding time spent on procedures during visit, I spent 40 minutes reviewing the medical record, interviewing and examining the patient, discussing diagnosis and treatment, and coordinating care.    No procedure    This evaluation was conducted via [employer] Teams using secure and  encrypted videoconferencing technology. The patient was in their home in the [facility].    The patient's identity was confirmed and verbal consent was obtained for this virtual visit.                HISTORY OF PRESENT ILLNESS   Osman Choi is a 79 y.o. RHD male presenting for Parkinson's  PMH: atrial fibrillation    Initial HPI 10/09/24  Dx: 2016, with initial symptoms as left hand rest tremor.      Interval history  4/2022 Dr. Denny Combs in [place], [place]   8/2024 Dr. Cheikh Campbell: Started ropinirole 1mg nightly for what was thought to be RLS, but discontinued.   10/09/24: first visit with me. Referral to neuro-oph, audiology.  11/07/24: Botox #1 for jaw dystonia/bruxism and sialorrhea.  04/11/25: Botox #2. Referral to SLP  4/13/25: fell resulting in left proximal humerus and dislocated glenohumeral joint, s/p total shoulder replacement  6/10/25: hallucinations and found neupro patch detached  06/26/25: stop rasagiline  07/09/25: decrease Neupro 8mg to 6mg       Current Regimen  Neupro 8 mg patch  Carbidopa/levodopa 25/100: 2 tablet, 3x/day at 9AM, 1PM, 6PM  Latency: can't tell  Duration: can't tell  Efficacy: can't tell  Dyskinesia/Dystonia: tongue protrusions at times. Legs can move sometimes. Used to have toe curling.  Sfx: none      ROS:  Gait:   a few falls since last visit. Balance worsening, shuffling more.   04/11/25: had pneumonia and recovering strength. Not as balanced. Not yet interested in PT  06/26/25: Home health PT, then outpatient PT for shoulder. Needing assistance and gait belt due to shoulder replacement  07/09/25: July 2 he tripped on curb that caught his cane. Fell on face and suffered facial fracture. Sunday had hallucination. Last night seemed less present, disoriented. Going to PT and OT still, 2-3 times a week.  Orthostasis:   no issues  Constipation:   no issues  Urinary issues:   no issues, hx of UTI   Speech/Swallow:   no dysphagia.   +hypophonia. 04/11/25: Worsening, hard  to understand at restaurants  Anosmia: +impaired  Cognition:   no issues  Mood:   no anxiety, no depression, no impulsivity  04/11/25 PHQ-9 = 0, C-SSRS = neg  Hallucinations:   none  Sleep/RBD:   +RBD: dream enactment ongoing for several years. 04/11/25: improved  Sleeps well, 1-2x/night for bathroom. No daytime fatigue    Past Medical History:   Diagnosis Date    Parkinson disease (HCC)      Past Surgical History:   Procedure Laterality Date    ORIF, SHOULDER       No family history on file.  Social History     Socioeconomic History    Marital status:      Spouse name: Not on file    Number of children: Not on file    Years of education: Not on file    Highest education level: Not on file   Occupational History    Not on file   Tobacco Use    Smoking status: Never    Smokeless tobacco: Never   Vaping Use    Vaping status: Never Used   Substance and Sexual Activity    Alcohol use: Yes     Alcohol/week: 6.0 oz     Types: 10 Glasses of wine per week    Drug use: Never    Sexual activity: Not on file   Other Topics Concern    Not on file   Social History Narrative    Not on file     Social Drivers of Health     Financial Resource Strain: Not on file   Food Insecurity: Not on file (4/23/2025)   Transportation Needs: No Transportation Needs (6/9/2025)    Received from [facility] (and [facility] prior to 7/1/2021)    OASIS : Transportation     Lack of Transportation (Medical): No     Lack of Transportation (Non-Medical): No     Patient Unable or Declines to Respond: No   Physical Activity: Not on file   Stress: Not on file   Social Connections: Feeling Socially Integrated (6/9/2025)    Received from [employer] (and [facility] prior to 7/1/2021)    OASIS : Social Isolation     Frequency of experiencing loneliness or isolation: Rarely   Intimate Partner Violence: Not At Risk (7/6/2025)    [facility] from [facility]and [facility] [place] [place] prior to 7/1/2021)    Feeling Safe      Are you in a relationship with someone who hurts you emotionally and/or physically?: No   Housing Stability: Low Risk  (4/18/2025)    Housing Stability Vital Sign     Unable to Pay for Housing in the Last Year: No     Number of Times Moved in the Last Year: 0     Homeless in the Last Year: No     Current Outpatient Medications   Medication    rotigotine (NEUPRO) 6 MG/24HR patch    nebivolol (BYSTOLIC) 5 MG Tab tablet    acetaminophen (TYLENOL) 500 MG Tab    carbidopa-levodopa (SINEMET)  MG Tab    apixaban (ELIQUIS) 5mg Tab    pravastatin (PRAVACHOL) 40 MG tablet    alendronate (FOSAMAX) 70 MG Tab    Cyanocobalamin (B-12) 1000 MCG Tab    Cholecalciferol (D3 2000 PO)     Current Facility-Administered Medications   Medication Dose    onabotulinum toxin type A (Botox) injection 100 Units  100 Units     No Known Allergies          DATA / RESULTS   CT Head 8/2024  1. No acute intracranial abnormality.   2. Calvarium is intact.     CT Head 7/6/2025  1. Symmetric prominence bilateral frontal and parietal subdural spaces extending from the vertex to the anterior cranial fossa with density just slightly greater than CSF, measuring up to 6 mm transverse dimension, which appears new from the prior study, consistent with chronic bilateral subdural hematomas. No significant mass effect.   2. Mild right frontal and ethmoid sinus disease.     CT Head 7/2/2025  1.  No evidence of acute intracranial hemorrhage or mass lesion.  2.  There is a nondisplaced fracture involving the right frontal calvarium and right frontal sinus.    Vit D: 60  TSH: 1.73  B12: 448  Folate: 8.2    No results found for: \"25HYDROXY\", \"UYLZSZMZ07\", \"TSHULTRASEN\", \"SPIFINTERP\", \"LHZ544\", \"JEROD\", \"HBA1C\", \"LDL\"             OBJECTIVE      Vitals:    07/09/25 1342   BP: (!) 82/64   BP Location: Right arm   Patient Position: Sitting   Pulse: 97   Weight: " "81.6 kg (180 lb)   Height: 1.778 m (5' 10\")       Physical Exam  +hypophonia    Intermittent head movements and tongue protrusions.      PRIOR EXAM BELOW FOR REFERENCE  UPDRS Right Left   Finger tapping 0 1   Hand Movement 0 0   Toe Tapping 0 0   Leg Agility 0 1   Rigidity 0 1   Rest Tremor 0 0   Postural Tremor 0 0   Kinetic Tremor 0 0      No dystonia, tics, stereotypies, athetosis, akathisia, or chorea noted.     Gait  Posture - slightly stooped   Base - narrow   Stride length - decreased mild   Arm swing - decreased   Speed - normal  Shuffling/freezing - none  U-Turn - normal           PROCEDURE   N/A  "

## 2025-07-17 ENCOUNTER — APPOINTMENT (OUTPATIENT)
Dept: URBAN - METROPOLITAN AREA CLINIC 38 | Facility: CLINIC | Age: 80
Setting detail: DERMATOLOGY
End: 2025-07-17

## 2025-07-17 DIAGNOSIS — Z71.89 OTHER SPECIFIED COUNSELING: ICD-10-CM

## 2025-07-17 DIAGNOSIS — L57.0 ACTINIC KERATOSIS: ICD-10-CM

## 2025-07-17 DIAGNOSIS — F42.4 EXCORIATION (SKIN-PICKING) DISORDER: ICD-10-CM

## 2025-07-17 DIAGNOSIS — L72.0 EPIDERMAL CYST: ICD-10-CM

## 2025-07-17 DIAGNOSIS — S31000A OPEN WOUND(S) (MULTIPLE) OF UNSPECIFIED SITE(S), WITHOUT MENTION OF COMPLICATION: ICD-10-CM

## 2025-07-17 DIAGNOSIS — D22 MELANOCYTIC NEVI: ICD-10-CM

## 2025-07-17 DIAGNOSIS — L82.1 OTHER SEBORRHEIC KERATOSIS: ICD-10-CM

## 2025-07-17 DIAGNOSIS — L82.0 INFLAMED SEBORRHEIC KERATOSIS: ICD-10-CM

## 2025-07-17 DIAGNOSIS — Z85.828 PERSONAL HISTORY OF OTHER MALIGNANT NEOPLASM OF SKIN: ICD-10-CM

## 2025-07-17 DIAGNOSIS — Z85.820 PERSONAL HISTORY OF MALIGNANT MELANOMA OF SKIN: ICD-10-CM

## 2025-07-17 DIAGNOSIS — D18.0 HEMANGIOMA: ICD-10-CM

## 2025-07-17 DIAGNOSIS — B36.8 OTHER SPECIFIED SUPERFICIAL MYCOSES: ICD-10-CM

## 2025-07-17 DIAGNOSIS — L81.4 OTHER MELANIN HYPERPIGMENTATION: ICD-10-CM

## 2025-07-17 PROBLEM — S81.802A UNSPECIFIED OPEN WOUND, LEFT LOWER LEG, INITIAL ENCOUNTER: Status: ACTIVE | Noted: 2025-07-17

## 2025-07-17 PROBLEM — D22.9 MELANOCYTIC NEVI, UNSPECIFIED: Status: ACTIVE | Noted: 2025-07-17

## 2025-07-17 PROBLEM — D18.01 HEMANGIOMA OF SKIN AND SUBCUTANEOUS TISSUE: Status: ACTIVE | Noted: 2025-07-17

## 2025-07-17 PROCEDURE — ? COUNSELING

## 2025-07-17 PROCEDURE — ? LIQUID NITROGEN

## 2025-07-17 PROCEDURE — ? PRESCRIPTION

## 2025-07-17 RX ORDER — KETOCONAZOLE 20 MG/ML
SHAMPOO, SUSPENSION TOPICAL BIW
Qty: 120 | Refills: 3 | Status: ERX

## 2025-07-17 ASSESSMENT — LOCATION DETAILED DESCRIPTION DERM
LOCATION DETAILED: PERIUMBILICAL SKIN
LOCATION DETAILED: LEFT CLAVICULAR NECK
LOCATION DETAILED: RIGHT PROXIMAL DORSAL FOREARM
LOCATION DETAILED: LEFT DISTAL PRETIBIAL REGION
LOCATION DETAILED: LEFT SUPERIOR MEDIAL MIDBACK
LOCATION DETAILED: LEFT DISTAL DORSAL FOREARM
LOCATION DETAILED: LEFT LATERAL TEMPLE
LOCATION DETAILED: LEFT ANTERIOR PROXIMAL UPPER ARM
LOCATION DETAILED: INFERIOR MID FOREHEAD
LOCATION DETAILED: RIGHT MEDIAL TRAPEZIAL NECK
LOCATION DETAILED: RIGHT ANTERIOR PROXIMAL THIGH
LOCATION DETAILED: RIGHT SUPERIOR PARIETAL SCALP
LOCATION DETAILED: RIGHT INFERIOR UPPER BACK
LOCATION DETAILED: LEFT SUPERIOR FOREHEAD
LOCATION DETAILED: SUPERIOR THORACIC SPINE
LOCATION DETAILED: LEFT ANTERIOR PROXIMAL THIGH
LOCATION DETAILED: LEFT PROXIMAL DORSAL FOREARM
LOCATION DETAILED: LEFT SUPERIOR PARIETAL SCALP

## 2025-07-17 ASSESSMENT — LOCATION SIMPLE DESCRIPTION DERM
LOCATION SIMPLE: LEFT TEMPLE
LOCATION SIMPLE: ABDOMEN
LOCATION SIMPLE: RIGHT THIGH
LOCATION SIMPLE: SCALP
LOCATION SIMPLE: RIGHT UPPER BACK
LOCATION SIMPLE: LEFT THIGH
LOCATION SIMPLE: LEFT ANTERIOR NECK
LOCATION SIMPLE: LEFT FOREHEAD
LOCATION SIMPLE: LEFT PRETIBIAL REGION
LOCATION SIMPLE: UPPER BACK
LOCATION SIMPLE: LEFT FOREARM
LOCATION SIMPLE: RIGHT FOREARM
LOCATION SIMPLE: INFERIOR FOREHEAD
LOCATION SIMPLE: LEFT LOWER BACK
LOCATION SIMPLE: POSTERIOR NECK
LOCATION SIMPLE: LEFT UPPER ARM

## 2025-07-17 ASSESSMENT — LOCATION ZONE DERM
LOCATION ZONE: ARM
LOCATION ZONE: NECK
LOCATION ZONE: SCALP
LOCATION ZONE: FACE
LOCATION ZONE: LEG
LOCATION ZONE: TRUNK

## 2025-07-17 NOTE — PROCEDURE: LIQUID NITROGEN
Medical Necessity Information: It is in your best interest to select a reason for this procedure from the list below. All of these items fulfill various CMS LCD requirements except the new and changing color options.
Medical Necessity Clause: This procedure was medically necessary because the lesions that were treated were:
Number Of Freeze-Thaw Cycles: 2 freeze-thaw cycles
Spray Paint Text: The liquid nitrogen was applied to the skin utilizing a spray paint frosting technique.
Spray Paint Technique: No
Post-Care Instructions: I reviewed with the patient in detail post-care instructions. Patient is to wear sunprotection, and avoid picking at any of the treated lesions. Pt may apply Vaseline to crusted or scabbing areas.
Render Post-Care Instructions In Note?: yes
Detail Level: Detailed
Consent: The patient's consent was obtained including but not limited to risks of crusting, scabbing, blistering, scarring, darker or lighter pigmentary change, recurrence, incomplete removal and infection.
Duration Of Freeze Thaw-Cycle (Seconds): 0

## 2025-07-17 NOTE — PROCEDURE: COUNSELING
Detail Level: Zone
Detail Level: Generalized
Detail Level: Simple
Detail Level: Detailed
Patient Specific Counseling (Will Not Stick From Patient To Patient): Under care of Neri Ramirez.

## 2025-08-14 ENCOUNTER — APPOINTMENT (OUTPATIENT)
Dept: NEUROLOGY | Facility: MEDICAL CENTER | Age: 80
End: 2025-08-14
Attending: INTERNAL MEDICINE
Payer: MEDICARE

## 2025-12-04 ENCOUNTER — APPOINTMENT (OUTPATIENT)
Dept: NEUROLOGY | Facility: MEDICAL CENTER | Age: 80
End: 2025-12-04
Attending: INTERNAL MEDICINE
Payer: MEDICARE